# Patient Record
Sex: FEMALE | Race: WHITE | NOT HISPANIC OR LATINO
[De-identification: names, ages, dates, MRNs, and addresses within clinical notes are randomized per-mention and may not be internally consistent; named-entity substitution may affect disease eponyms.]

---

## 2017-08-23 ENCOUNTER — APPOINTMENT (OUTPATIENT)
Dept: SURGERY | Facility: CLINIC | Age: 57
End: 2017-08-23
Payer: COMMERCIAL

## 2017-08-23 VITALS
TEMPERATURE: 98.1 F | BODY MASS INDEX: 25.61 KG/M2 | RESPIRATION RATE: 16 BRPM | SYSTOLIC BLOOD PRESSURE: 158 MMHG | HEART RATE: 66 BPM | DIASTOLIC BLOOD PRESSURE: 77 MMHG | HEIGHT: 64 IN | WEIGHT: 150 LBS | OXYGEN SATURATION: 97 %

## 2017-08-23 DIAGNOSIS — Z78.9 OTHER SPECIFIED HEALTH STATUS: ICD-10-CM

## 2017-08-23 DIAGNOSIS — Z80.0 FAMILY HISTORY OF MALIGNANT NEOPLASM OF DIGESTIVE ORGANS: ICD-10-CM

## 2017-08-23 DIAGNOSIS — Z00.00 ENCOUNTER FOR GENERAL ADULT MEDICAL EXAMINATION W/OUT ABNORMAL FINDINGS: ICD-10-CM

## 2017-08-23 PROCEDURE — 99205 OFFICE O/P NEW HI 60 MIN: CPT

## 2018-04-08 ENCOUNTER — TRANSCRIPTION ENCOUNTER (OUTPATIENT)
Age: 58
End: 2018-04-08

## 2018-04-08 ENCOUNTER — INPATIENT (INPATIENT)
Facility: HOSPITAL | Age: 58
LOS: 1 days | Discharge: HOME | DRG: 418 | End: 2018-04-10
Attending: SURGERY | Admitting: SURGERY
Payer: COMMERCIAL

## 2018-04-08 VITALS
TEMPERATURE: 98 F | HEART RATE: 67 BPM | DIASTOLIC BLOOD PRESSURE: 101 MMHG | SYSTOLIC BLOOD PRESSURE: 197 MMHG | WEIGHT: 145.06 LBS | HEIGHT: 63 IN | RESPIRATION RATE: 18 BRPM | OXYGEN SATURATION: 99 %

## 2018-04-08 DIAGNOSIS — Z98.891 HISTORY OF UTERINE SCAR FROM PREVIOUS SURGERY: Chronic | ICD-10-CM

## 2018-04-08 DIAGNOSIS — Z90.49 ACQUIRED ABSENCE OF OTHER SPECIFIED PARTS OF DIGESTIVE TRACT: Chronic | ICD-10-CM

## 2018-04-08 DIAGNOSIS — K81.9 CHOLECYSTITIS, UNSPECIFIED: ICD-10-CM

## 2018-04-08 LAB
ALBUMIN SERPL ELPH-MCNC: 5 G/DL — SIGNIFICANT CHANGE UP (ref 3.3–5)
ALP SERPL-CCNC: 108 U/L — SIGNIFICANT CHANGE UP (ref 40–120)
ALT FLD-CCNC: 17 U/L RC — SIGNIFICANT CHANGE UP (ref 10–45)
ANION GAP SERPL CALC-SCNC: 18 MMOL/L — HIGH (ref 5–17)
APTT BLD: 30.6 SEC — SIGNIFICANT CHANGE UP (ref 27.5–37.4)
AST SERPL-CCNC: 17 U/L — SIGNIFICANT CHANGE UP (ref 10–40)
BASE EXCESS BLDV CALC-SCNC: 0.3 MMOL/L — SIGNIFICANT CHANGE UP (ref -2–2)
BASOPHILS # BLD AUTO: 0 K/UL — SIGNIFICANT CHANGE UP (ref 0–0.2)
BASOPHILS NFR BLD AUTO: 0.2 % — SIGNIFICANT CHANGE UP (ref 0–2)
BILIRUB SERPL-MCNC: 0.8 MG/DL — SIGNIFICANT CHANGE UP (ref 0.2–1.2)
BLD GP AB SCN SERPL QL: NEGATIVE — SIGNIFICANT CHANGE UP
BUN SERPL-MCNC: 10 MG/DL — SIGNIFICANT CHANGE UP (ref 7–23)
CA-I SERPL-SCNC: 1.15 MMOL/L — SIGNIFICANT CHANGE UP (ref 1.12–1.3)
CALCIUM SERPL-MCNC: 10 MG/DL — SIGNIFICANT CHANGE UP (ref 8.4–10.5)
CHLORIDE BLDV-SCNC: 105 MMOL/L — SIGNIFICANT CHANGE UP (ref 96–108)
CHLORIDE SERPL-SCNC: 99 MMOL/L — SIGNIFICANT CHANGE UP (ref 96–108)
CO2 BLDV-SCNC: 26 MMOL/L — SIGNIFICANT CHANGE UP (ref 22–30)
CO2 SERPL-SCNC: 22 MMOL/L — SIGNIFICANT CHANGE UP (ref 22–31)
CREAT SERPL-MCNC: 0.79 MG/DL — SIGNIFICANT CHANGE UP (ref 0.5–1.3)
EOSINOPHIL # BLD AUTO: 0 K/UL — SIGNIFICANT CHANGE UP (ref 0–0.5)
EOSINOPHIL NFR BLD AUTO: 0.1 % — SIGNIFICANT CHANGE UP (ref 0–6)
GAS PNL BLDV: 136 MMOL/L — SIGNIFICANT CHANGE UP (ref 136–145)
GAS PNL BLDV: SIGNIFICANT CHANGE UP
GAS PNL BLDV: SIGNIFICANT CHANGE UP
GLUCOSE BLDV-MCNC: 125 MG/DL — HIGH (ref 70–99)
GLUCOSE SERPL-MCNC: 127 MG/DL — HIGH (ref 70–99)
HCO3 BLDV-SCNC: 25 MMOL/L — SIGNIFICANT CHANGE UP (ref 21–29)
HCT VFR BLD CALC: 41.2 % — SIGNIFICANT CHANGE UP (ref 34.5–45)
HCT VFR BLDA CALC: 43 % — SIGNIFICANT CHANGE UP (ref 39–50)
HGB BLD CALC-MCNC: 13.9 G/DL — SIGNIFICANT CHANGE UP (ref 11.5–15.5)
HGB BLD-MCNC: 14.2 G/DL — SIGNIFICANT CHANGE UP (ref 11.5–15.5)
INR BLD: 1.06 RATIO — SIGNIFICANT CHANGE UP (ref 0.88–1.16)
LACTATE BLDV-MCNC: 2.7 MMOL/L — HIGH (ref 0.7–2)
LIDOCAIN IGE QN: 39 U/L — SIGNIFICANT CHANGE UP (ref 7–60)
LYMPHOCYTES # BLD AUTO: 1.6 K/UL — SIGNIFICANT CHANGE UP (ref 1–3.3)
LYMPHOCYTES # BLD AUTO: 13.6 % — SIGNIFICANT CHANGE UP (ref 13–44)
MCHC RBC-ENTMCNC: 26.9 PG — LOW (ref 27–34)
MCHC RBC-ENTMCNC: 34.6 GM/DL — SIGNIFICANT CHANGE UP (ref 32–36)
MCV RBC AUTO: 77.8 FL — LOW (ref 80–100)
MONOCYTES # BLD AUTO: 0.3 K/UL — SIGNIFICANT CHANGE UP (ref 0–0.9)
MONOCYTES NFR BLD AUTO: 2.8 % — SIGNIFICANT CHANGE UP (ref 2–14)
NEUTROPHILS # BLD AUTO: 10.1 K/UL — HIGH (ref 1.8–7.4)
NEUTROPHILS NFR BLD AUTO: 83.2 % — HIGH (ref 43–77)
OTHER CELLS CSF MANUAL: 11 ML/DL — LOW (ref 18–22)
PCO2 BLDV: 42 MMHG — SIGNIFICANT CHANGE UP (ref 35–50)
PH BLDV: 7.39 — SIGNIFICANT CHANGE UP (ref 7.35–7.45)
PLATELET # BLD AUTO: 337 K/UL — SIGNIFICANT CHANGE UP (ref 150–400)
PO2 BLDV: 32 MMHG — SIGNIFICANT CHANGE UP (ref 25–45)
POTASSIUM BLDV-SCNC: 3.7 MMOL/L — SIGNIFICANT CHANGE UP (ref 3.5–5)
POTASSIUM SERPL-MCNC: 3.8 MMOL/L — SIGNIFICANT CHANGE UP (ref 3.5–5.3)
POTASSIUM SERPL-SCNC: 3.8 MMOL/L — SIGNIFICANT CHANGE UP (ref 3.5–5.3)
PROT SERPL-MCNC: 8.6 G/DL — HIGH (ref 6–8.3)
PROTHROM AB SERPL-ACNC: 11.6 SEC — SIGNIFICANT CHANGE UP (ref 9.8–12.7)
RAPID RVP RESULT: SIGNIFICANT CHANGE UP
RBC # BLD: 5.29 M/UL — HIGH (ref 3.8–5.2)
RBC # FLD: 12.5 % — SIGNIFICANT CHANGE UP (ref 10.3–14.5)
RH IG SCN BLD-IMP: NEGATIVE — SIGNIFICANT CHANGE UP
SAO2 % BLDV: 60 % — LOW (ref 67–88)
SODIUM SERPL-SCNC: 139 MMOL/L — SIGNIFICANT CHANGE UP (ref 135–145)
WBC # BLD: 12.1 K/UL — HIGH (ref 3.8–10.5)
WBC # FLD AUTO: 12.1 K/UL — HIGH (ref 3.8–10.5)

## 2018-04-08 PROCEDURE — 71045 X-RAY EXAM CHEST 1 VIEW: CPT | Mod: 26

## 2018-04-08 PROCEDURE — 99285 EMERGENCY DEPT VISIT HI MDM: CPT | Mod: 25

## 2018-04-08 PROCEDURE — 74177 CT ABD & PELVIS W/CONTRAST: CPT | Mod: 26

## 2018-04-08 PROCEDURE — 93010 ELECTROCARDIOGRAM REPORT: CPT

## 2018-04-08 RX ORDER — CEFOTETAN DISODIUM 1 G
1 VIAL (EA) INJECTION EVERY 12 HOURS
Qty: 0 | Refills: 0 | Status: DISCONTINUED | OUTPATIENT
Start: 2018-04-08 | End: 2018-04-09

## 2018-04-08 RX ORDER — SODIUM CHLORIDE 9 MG/ML
1000 INJECTION INTRAMUSCULAR; INTRAVENOUS; SUBCUTANEOUS ONCE
Qty: 0 | Refills: 0 | Status: COMPLETED | OUTPATIENT
Start: 2018-04-08 | End: 2018-04-08

## 2018-04-08 RX ORDER — SODIUM CHLORIDE 9 MG/ML
1000 INJECTION, SOLUTION INTRAVENOUS
Qty: 0 | Refills: 0 | Status: DISCONTINUED | OUTPATIENT
Start: 2018-04-08 | End: 2018-04-09

## 2018-04-08 RX ORDER — MORPHINE SULFATE 50 MG/1
4 CAPSULE, EXTENDED RELEASE ORAL ONCE
Qty: 0 | Refills: 0 | Status: DISCONTINUED | OUTPATIENT
Start: 2018-04-08 | End: 2018-04-08

## 2018-04-08 RX ORDER — MEROPENEM 1 G/30ML
1000 INJECTION INTRAVENOUS ONCE
Qty: 0 | Refills: 0 | Status: COMPLETED | OUTPATIENT
Start: 2018-04-08 | End: 2018-04-08

## 2018-04-08 RX ORDER — ENOXAPARIN SODIUM 100 MG/ML
40 INJECTION SUBCUTANEOUS DAILY
Qty: 0 | Refills: 0 | Status: DISCONTINUED | OUTPATIENT
Start: 2018-04-08 | End: 2018-04-09

## 2018-04-08 RX ORDER — ONDANSETRON 8 MG/1
4 TABLET, FILM COATED ORAL ONCE
Qty: 0 | Refills: 0 | Status: COMPLETED | OUTPATIENT
Start: 2018-04-08 | End: 2018-04-08

## 2018-04-08 RX ORDER — ACETAMINOPHEN 500 MG
1000 TABLET ORAL ONCE
Qty: 0 | Refills: 0 | Status: COMPLETED | OUTPATIENT
Start: 2018-04-08 | End: 2018-04-08

## 2018-04-08 RX ADMIN — SODIUM CHLORIDE 1000 MILLILITER(S): 9 INJECTION INTRAMUSCULAR; INTRAVENOUS; SUBCUTANEOUS at 11:11

## 2018-04-08 RX ADMIN — MORPHINE SULFATE 4 MILLIGRAM(S): 50 CAPSULE, EXTENDED RELEASE ORAL at 11:40

## 2018-04-08 RX ADMIN — ONDANSETRON 4 MILLIGRAM(S): 8 TABLET, FILM COATED ORAL at 14:36

## 2018-04-08 RX ADMIN — MORPHINE SULFATE 4 MILLIGRAM(S): 50 CAPSULE, EXTENDED RELEASE ORAL at 11:10

## 2018-04-08 RX ADMIN — ONDANSETRON 4 MILLIGRAM(S): 8 TABLET, FILM COATED ORAL at 11:11

## 2018-04-08 RX ADMIN — Medication 100 GRAM(S): at 18:40

## 2018-04-08 RX ADMIN — MEROPENEM 100 MILLIGRAM(S): 1 INJECTION INTRAVENOUS at 18:03

## 2018-04-08 RX ADMIN — ENOXAPARIN SODIUM 40 MILLIGRAM(S): 100 INJECTION SUBCUTANEOUS at 18:40

## 2018-04-08 RX ADMIN — Medication 1000 MILLIGRAM(S): at 12:39

## 2018-04-08 RX ADMIN — SODIUM CHLORIDE 100 MILLILITER(S): 9 INJECTION, SOLUTION INTRAVENOUS at 18:03

## 2018-04-08 RX ADMIN — Medication 400 MILLIGRAM(S): at 12:09

## 2018-04-08 NOTE — H&P ADULT - NSHPREVIEWOFSYSTEMS_GEN_ALL_CORE
Constitutional: No fever, fatigue or weight loss.  Skin: No rash.  Eyes: No recent vision problems or eye pain.  ENT: No congestion, ear pain, or sore throat.  Endocrine: No thyroid problems.  Cardiovascular: No chest pain or palpation.  Respiratory: No cough, shortness of breath, congestion, or wheezing.  Gastrointestinal:as above  Genitourinary: No dysuria.  Musculoskeletal: No joint swelling.  Neurologic: No headache.

## 2018-04-08 NOTE — H&P ADULT - ASSESSMENT
Patient is a 57 yo female with acute calculus cholecystitis presenting with about 12 hours of pain, normal LFTs and slight leukocytosis.  - Admit to General Surgery (Green Team) under Dr. RICA aVng  - Cefotetan  - NPO / IVF  - Ambulation / pulmonary toileting  - Lovenox for VTE ppx  - Rapid Respiratory viral panel  - Patient discussed with Dr. Vang  - Surgery added on for Monday vs later next week    MANAS Evans MD PGYII  x [phone]

## 2018-04-08 NOTE — ED PROVIDER NOTE - PROGRESS NOTE DETAILS
d/w surg, will see pt. may need hida. d/w rads. they are attempting to call in for hida however no call back from page twice - will get back to me. surg has seen pt. awaiting their recs.

## 2018-04-08 NOTE — ED ADULT NURSE NOTE - OBJECTIVE STATEMENT
58 yr old female with h/o cholelithiasis  present to the ER for RUQ abd pain associated with nausea and vomiting. pt reported that she started to experience upper back pain yesterday pm. Report that afterwards pain started in the RUQ abd, stated she vomited greater than 10 times. Pt reports pain level of 10/10 on pain scale and sharp in quality and waxing and waning. Denies fever, chills/ diarrhea.

## 2018-04-08 NOTE — H&P ADULT - HISTORY OF PRESENT ILLNESS
Patient is a slightly anxious but pleasant 58 year old female with no significant medical history presenting with abdominal pain of acute onset that started at midnight after binge eating fattening foods that evening.  She has a known history of gallstones and states that she is being followed by Dr. NEO Carter closely.  The patient states that shes had R abdominal pain a few times before, but nothing like this.  She had 10 episodes of emesis and nausea last night with associated chills.  She has no change in bowel function and states that she has been extremely stressed as of late due to work and her daughter.  Patient states she has had sniffles for the past few days.    ED: IVF, Morphine, Tylenol, Zofran  PMH: cholelithiasis, appendectomy,  section (15 years ago)  Social: negative for substance abuse, lives with  and daughter, works as an

## 2018-04-08 NOTE — ED PROVIDER NOTE - OBJECTIVE STATEMENT
58 year old female w no PMHx presents w complaint of abdominal pain and nausea which began last night. She has vomited 10 times since last night. She reports a history of gallstones. Denies fever, chills, diarrhea. History of C section and appendectomy.

## 2018-04-08 NOTE — H&P ADULT - ATTENDING COMMENTS
58 y.o. female with acute onset RUQ pain accompanied by many episodes of nausea and vomiting. Has known gallstones and multiple similar but less severe attacks in the past. Sono confirms a large calcified stone in the gallbladder neck. For admission, IV AB Rx and lap fadia this admission.

## 2018-04-08 NOTE — H&P ADULT - NSHPLABSRESULTS_GEN_ALL_CORE
14.2   12.1  )-----------( 337      ( 08 Apr 2018 11:17 )             41.2       04-08    139  |  99  |  10  ----------------------------<  127<H>  3.8   |  22  |  0.79    Ca    10.0      08 Apr 2018 11:17    TPro  8.6<H>  /  Alb  5.0  /  TBili  0.8  /  DBili  x   /  AST  17  /  ALT  17  /  AlkPhos  108  04-08    < from: CT Abdomen and Pelvis w/ Oral Cont and w/ IV Cont (04.08.18 @ 12:39) >    FINDINGS:    LOWER CHEST: Within normal limits.    LIVER: Within normal limits.  BILE DUCTS: Normal caliber.  GALLBLADDER: Large calcified gallstone. No appreciable gallbladder wall   thickening however, there is trace pericholecystic fluid/inflammatory   change in the right upper quadrant.  SPLEEN: Within normal limits.  PANCREAS: Within normal limits.  ADRENALS: Within normal limits.  KIDNEYS/URETERS: Bilateral hypodensities are too small to characterize.    BLADDER: Within normal limits.  REPRODUCTIVE ORGANS: Fibroid uterus, the largest measuring 5.1 x 4.6 x   4.3 cm. No adnexal masses.    BOWEL: No bowel obstruction. Appendix is not visualized.  PERITONEUM: Trace pelvic free fluid.  VESSELS:  Within normal limits.  RETROPERITONEUM: No lymphadenopathy.    ABDOMINAL WALL: Within normal limits.  BONES: Within normal limits.    IMPRESSION: Large calcified gallbladder stone with trace pericholecystic   fluid/inflammatory changes, raising concern for acute cholecystitis.   Trace pelvic ascites is also noted.Consider correlation with HIDA.    Fibroid uterus.    < end of copied text >

## 2018-04-08 NOTE — H&P ADULT - NSHPPHYSICALEXAM_GEN_ALL_CORE
PHYSICAL EXAM:  GENERAL: NAD, well-developed  HEAD:  Atraumatic, Normocephalic  EYES: EOMI, PERRLA, conjunctiva and sclera clear  NECK: Supple, No JVD  CHEST/LUNG: Clear to auscultation bilaterally; No wheeze  HEART: Regular rate and rhythm; No murmurs, rubs, or gallops  ABDOMEN: Soft, +Gomez's sign, no rebound, no guarding  EXTREMITIES:  2+ Peripheral Pulses, No clubbing, cyanosis, or edema  PSYCH: AAOx3  NEUROLOGY: non-focal  SKIN: No rashes or lesions

## 2018-04-08 NOTE — ED PROVIDER NOTE - ATTENDING CONTRIBUTION TO CARE
1 day of vomiting and abdominal pain. past hx appy/section. abdominal ttp max at ruq. labs/ctap/ua. Symptomatic treatment.

## 2018-04-09 ENCOUNTER — RESULT REVIEW (OUTPATIENT)
Age: 58
End: 2018-04-09

## 2018-04-09 LAB
ANION GAP SERPL CALC-SCNC: 9 MMOL/L — SIGNIFICANT CHANGE UP (ref 5–17)
BUN SERPL-MCNC: 10 MG/DL — SIGNIFICANT CHANGE UP (ref 7–23)
CALCIUM SERPL-MCNC: 9 MG/DL — SIGNIFICANT CHANGE UP (ref 8.4–10.5)
CHLORIDE SERPL-SCNC: 104 MMOL/L — SIGNIFICANT CHANGE UP (ref 96–108)
CO2 SERPL-SCNC: 27 MMOL/L — SIGNIFICANT CHANGE UP (ref 22–31)
CREAT SERPL-MCNC: 1.03 MG/DL — SIGNIFICANT CHANGE UP (ref 0.5–1.3)
GLUCOSE SERPL-MCNC: 99 MG/DL — SIGNIFICANT CHANGE UP (ref 70–99)
HCT VFR BLD CALC: 36.8 % — SIGNIFICANT CHANGE UP (ref 34.5–45)
HGB BLD-MCNC: 12 G/DL — SIGNIFICANT CHANGE UP (ref 11.5–15.5)
MAGNESIUM SERPL-MCNC: 2.2 MG/DL — SIGNIFICANT CHANGE UP (ref 1.6–2.6)
MCHC RBC-ENTMCNC: 26 PG — LOW (ref 27–34)
MCHC RBC-ENTMCNC: 32.6 GM/DL — SIGNIFICANT CHANGE UP (ref 32–36)
MCV RBC AUTO: 79.7 FL — LOW (ref 80–100)
PHOSPHATE SERPL-MCNC: 3.3 MG/DL — SIGNIFICANT CHANGE UP (ref 2.5–4.5)
PLATELET # BLD AUTO: 253 K/UL — SIGNIFICANT CHANGE UP (ref 150–400)
POTASSIUM SERPL-MCNC: 4.1 MMOL/L — SIGNIFICANT CHANGE UP (ref 3.5–5.3)
POTASSIUM SERPL-SCNC: 4.1 MMOL/L — SIGNIFICANT CHANGE UP (ref 3.5–5.3)
RBC # BLD: 4.62 M/UL — SIGNIFICANT CHANGE UP (ref 3.8–5.2)
RBC # FLD: 13.8 % — SIGNIFICANT CHANGE UP (ref 10.3–14.5)
SODIUM SERPL-SCNC: 140 MMOL/L — SIGNIFICANT CHANGE UP (ref 135–145)
WBC # BLD: 6.2 K/UL — SIGNIFICANT CHANGE UP (ref 3.8–10.5)
WBC # FLD AUTO: 6.2 K/UL — SIGNIFICANT CHANGE UP (ref 3.8–10.5)

## 2018-04-09 PROCEDURE — 88304 TISSUE EXAM BY PATHOLOGIST: CPT | Mod: 26

## 2018-04-09 RX ORDER — OXYCODONE HYDROCHLORIDE 5 MG/1
5 TABLET ORAL EVERY 6 HOURS
Qty: 0 | Refills: 0 | Status: DISCONTINUED | OUTPATIENT
Start: 2018-04-09 | End: 2018-04-10

## 2018-04-09 RX ORDER — HYDRALAZINE HCL 50 MG
20 TABLET ORAL ONCE
Qty: 0 | Refills: 0 | Status: COMPLETED | OUTPATIENT
Start: 2018-04-09 | End: 2018-04-09

## 2018-04-09 RX ORDER — LABETALOL HCL 100 MG
10 TABLET ORAL ONCE
Qty: 0 | Refills: 0 | Status: COMPLETED | OUTPATIENT
Start: 2018-04-09 | End: 2018-04-09

## 2018-04-09 RX ORDER — HYDRALAZINE HCL 50 MG
10 TABLET ORAL ONCE
Qty: 0 | Refills: 0 | Status: COMPLETED | OUTPATIENT
Start: 2018-04-09 | End: 2018-04-09

## 2018-04-09 RX ORDER — ACETAMINOPHEN 500 MG
1000 TABLET ORAL ONCE
Qty: 0 | Refills: 0 | Status: COMPLETED | OUTPATIENT
Start: 2018-04-09 | End: 2018-04-09

## 2018-04-09 RX ORDER — LABETALOL HCL 100 MG
5 TABLET ORAL ONCE
Qty: 0 | Refills: 0 | Status: COMPLETED | OUTPATIENT
Start: 2018-04-09 | End: 2018-04-09

## 2018-04-09 RX ORDER — DEXTROSE MONOHYDRATE, SODIUM CHLORIDE, AND POTASSIUM CHLORIDE 50; .745; 4.5 G/1000ML; G/1000ML; G/1000ML
1000 INJECTION, SOLUTION INTRAVENOUS
Qty: 0 | Refills: 0 | Status: DISCONTINUED | OUTPATIENT
Start: 2018-04-09 | End: 2018-04-10

## 2018-04-09 RX ORDER — ACETAMINOPHEN 500 MG
650 TABLET ORAL EVERY 6 HOURS
Qty: 0 | Refills: 0 | Status: DISCONTINUED | OUTPATIENT
Start: 2018-04-09 | End: 2018-04-10

## 2018-04-09 RX ORDER — LABETALOL HCL 100 MG
20 TABLET ORAL ONCE
Qty: 0 | Refills: 0 | Status: COMPLETED | OUTPATIENT
Start: 2018-04-09 | End: 2018-04-09

## 2018-04-09 RX ORDER — ONDANSETRON 8 MG/1
4 TABLET, FILM COATED ORAL EVERY 6 HOURS
Qty: 0 | Refills: 0 | Status: COMPLETED | OUTPATIENT
Start: 2018-04-09 | End: 2018-04-09

## 2018-04-09 RX ORDER — HYDROMORPHONE HYDROCHLORIDE 2 MG/ML
0.5 INJECTION INTRAMUSCULAR; INTRAVENOUS; SUBCUTANEOUS
Qty: 0 | Refills: 0 | Status: DISCONTINUED | OUTPATIENT
Start: 2018-04-09 | End: 2018-04-09

## 2018-04-09 RX ORDER — HYDROMORPHONE HYDROCHLORIDE 2 MG/ML
1 INJECTION INTRAMUSCULAR; INTRAVENOUS; SUBCUTANEOUS
Qty: 0 | Refills: 0 | Status: DISCONTINUED | OUTPATIENT
Start: 2018-04-09 | End: 2018-04-09

## 2018-04-09 RX ORDER — ONDANSETRON 8 MG/1
4 TABLET, FILM COATED ORAL ONCE
Qty: 0 | Refills: 0 | Status: COMPLETED | OUTPATIENT
Start: 2018-04-09 | End: 2018-04-09

## 2018-04-09 RX ORDER — OXYCODONE HYDROCHLORIDE 5 MG/1
10 TABLET ORAL EVERY 6 HOURS
Qty: 0 | Refills: 0 | Status: DISCONTINUED | OUTPATIENT
Start: 2018-04-09 | End: 2018-04-10

## 2018-04-09 RX ADMIN — Medication 1000 MILLIGRAM(S): at 19:15

## 2018-04-09 RX ADMIN — ONDANSETRON 4 MILLIGRAM(S): 8 TABLET, FILM COATED ORAL at 22:48

## 2018-04-09 RX ADMIN — Medication 400 MILLIGRAM(S): at 18:49

## 2018-04-09 RX ADMIN — Medication 1000 MILLIGRAM(S): at 02:50

## 2018-04-09 RX ADMIN — SODIUM CHLORIDE 100 MILLILITER(S): 9 INJECTION, SOLUTION INTRAVENOUS at 05:29

## 2018-04-09 RX ADMIN — HYDROMORPHONE HYDROCHLORIDE 0.5 MILLIGRAM(S): 2 INJECTION INTRAMUSCULAR; INTRAVENOUS; SUBCUTANEOUS at 13:45

## 2018-04-09 RX ADMIN — OXYCODONE HYDROCHLORIDE 5 MILLIGRAM(S): 5 TABLET ORAL at 23:20

## 2018-04-09 RX ADMIN — HYDROMORPHONE HYDROCHLORIDE 0.5 MILLIGRAM(S): 2 INJECTION INTRAMUSCULAR; INTRAVENOUS; SUBCUTANEOUS at 13:15

## 2018-04-09 RX ADMIN — Medication 10 MILLIGRAM(S): at 15:50

## 2018-04-09 RX ADMIN — Medication 100 GRAM(S): at 05:29

## 2018-04-09 RX ADMIN — OXYCODONE HYDROCHLORIDE 5 MILLIGRAM(S): 5 TABLET ORAL at 22:48

## 2018-04-09 RX ADMIN — Medication 20 MILLIGRAM(S): at 17:03

## 2018-04-09 RX ADMIN — Medication 20 MILLIGRAM(S): at 22:48

## 2018-04-09 RX ADMIN — Medication 400 MILLIGRAM(S): at 02:20

## 2018-04-09 RX ADMIN — Medication 5 MILLIGRAM(S): at 13:05

## 2018-04-09 RX ADMIN — ONDANSETRON 4 MILLIGRAM(S): 8 TABLET, FILM COATED ORAL at 18:49

## 2018-04-09 RX ADMIN — DEXTROSE MONOHYDRATE, SODIUM CHLORIDE, AND POTASSIUM CHLORIDE 100 MILLILITER(S): 50; .745; 4.5 INJECTION, SOLUTION INTRAVENOUS at 19:00

## 2018-04-09 RX ADMIN — Medication 10 MILLIGRAM(S): at 13:35

## 2018-04-09 NOTE — PROGRESS NOTE ADULT - SUBJECTIVE AND OBJECTIVE BOX
TRAUMA SURGERY PROGRESS NOTE (pager #[phone])    SUBJECTIVE: 59yo Woman seen and examined during morning rounds. No acute events overnight. Afebrile, VS stable. Pain well controlled with current regimen.     OBJECTIVE:    Physical Exam:  Gen: Resting in bed, NAD, alert and oriented  Resp: airway patent, respirations unlabored, no increased WOB   Abd: soft, NT/ND    Vital Signs Last 24 Hrs  T(C): 36.9 (09 Apr 2018 01:09), Max: 36.9 (08 Apr 2018 21:45)  T(F): 98.5 (09 Apr 2018 01:09), Max: 98.5 (08 Apr 2018 21:45)  HR: 71 (09 Apr 2018 01:09) (62 - 71)  BP: 139/65 (09 Apr 2018 01:09) (126/71 - 197/101)  BP(mean): --  RR: 18 (09 Apr 2018 01:09) (16 - 18)  SpO2: 95% (09 Apr 2018 01:09) (95% - 100%)    I&O's Detail    08 Apr 2018 07:01  -  09 Apr 2018 05:20  --------------------------------------------------------  IN:    IV PiggyBack: 50 mL    lactated ringers.: 200 mL  Total IN: 250 mL    OUT:    Voided: 750 mL  Total OUT: 750 mL    Total NET: -500 mL          MEDICATIONS  (STANDING):  cefoTEtan  IVPB 1 Gram(s) IV Intermittent every 12 hours  enoxaparin Injectable 40 milliGRAM(s) SubCutaneous daily  lactated ringers. 1000 milliLiter(s) (100 mL/Hr) IV Continuous <Continuous>    MEDICATIONS  (PRN):      LABS:                        14.2   12.1  )-----------( 337      ( 08 Apr 2018 11:17 )             41.2       04-08    139  |  99  |  10  ----------------------------<  127<H>  3.8   |  22  |  0.79    Ca    10.0      08 Apr 2018 11:17    TPro  8.6<H>  /  Alb  5.0  /  TBili  0.8  /  DBili  x   /  AST  17  /  ALT  17  /  AlkPhos  108  04-08 SURGERY PROGRESS NOTE (pager #[phone])    SUBJECTIVE: 57yo Woman seen and examined during morning rounds. No acute events overnight. Afebrile, VS stable. Pain well controlled with current regimen.     OBJECTIVE:    Physical Exam:  Gen: Resting in bed, NAD, alert and oriented  Resp: airway patent, respirations unlabored, no increased WOB   Abd: soft, NT/ND    Vital Signs Last 24 Hrs  T(C): 36.9 (09 Apr 2018 01:09), Max: 36.9 (08 Apr 2018 21:45)  T(F): 98.5 (09 Apr 2018 01:09), Max: 98.5 (08 Apr 2018 21:45)  HR: 71 (09 Apr 2018 01:09) (62 - 71)  BP: 139/65 (09 Apr 2018 01:09) (126/71 - 197/101)  BP(mean): --  RR: 18 (09 Apr 2018 01:09) (16 - 18)  SpO2: 95% (09 Apr 2018 01:09) (95% - 100%)    I&O's Detail    08 Apr 2018 07:01  -  09 Apr 2018 05:20  --------------------------------------------------------  IN:    IV PiggyBack: 50 mL    lactated ringers.: 200 mL  Total IN: 250 mL    OUT:    Voided: 750 mL  Total OUT: 750 mL    Total NET: -500 mL          MEDICATIONS  (STANDING):  cefoTEtan  IVPB 1 Gram(s) IV Intermittent every 12 hours  enoxaparin Injectable 40 milliGRAM(s) SubCutaneous daily  lactated ringers. 1000 milliLiter(s) (100 mL/Hr) IV Continuous <Continuous>    MEDICATIONS  (PRN):      LABS:                        14.2   12.1  )-----------( 337      ( 08 Apr 2018 11:17 )             41.2       04-08    139  |  99  |  10  ----------------------------<  127<H>  3.8   |  22  |  0.79    Ca    10.0      08 Apr 2018 11:17    TPro  8.6<H>  /  Alb  5.0  /  TBili  0.8  /  DBili  x   /  AST  17  /  ALT  17  /  AlkPhos  108  04-08

## 2018-04-09 NOTE — CONSULT NOTE ADULT - SUBJECTIVE AND OBJECTIVE BOX
SUBJECTIVE:  57 yo Female patient of Dr. Ross.   called yesterday to inform us that the patient was in [facility] ER with upper abdominal/RUQ pain radiating to her back which started Saturday night, about 11:45 pm, not relenting, and associated with multiple bouts of vomiting.  No fever.  No diarrhea.  Patient previously known to have gallstones.  CT scan confirmed a large calcified gallstone and findings consistent with cholecystitis.  Per patient, plan is for cholecystectomy this admission.  ______________________________________________________________________  PMH/PSH:  PAST MEDICAL & SURGICAL HISTORY:  S/P  section  S/P appendectomy  ______________________________________________________________________  MEDS:  MEDICATIONS  (STANDING):  cefoTEtan  IVPB 1 Gram(s) IV Intermittent every 12 hours  enoxaparin Injectable 40 milliGRAM(s) SubCutaneous daily  lactated ringers. 1000 milliLiter(s) (100 mL/Hr) IV Continuous <Continuous>  ______________________________________________________________________  ALL: penicillin (Hives)  ______________________________________________________________________  SH:  .   is Michael.  ______________________________________________________________________  ROS:  General:  No weight loss	  Skin/Breast:  Normal  Ophthalmologic:  Normal  ENMT:	Normal  Respiratory and Thorax:  Normal  Cardiovascular:	 Normal  Gastrointestinal:	  Above  Genitourinary:	Normal  Musculoskeletal:	  Normal  Neurological:	Normal  Psychiatric:  Normal	  Hematology/Lymphatics:	  Normal  Endocrine:  Normal  Allergic/Immunologic:  Normal	  ______________________________________________________________________  PHYSICAL EXAM:  T(C): 36.2 (18 @ 05:42), Max: 36.9 (18 @ 21:45)  HR: 63 (18 @ 05:42)  BP: 137/84 (18 @ 05:42)  RR: 18 (18 @ 05:42)  SpO2: 96% (18 @ 05:42)  Wt(kg): --  PHYSICAL EXAM:  Constitutional:  Appears well  HEENT:  Normal  Neck:  Supple  Respiratory:  Clear  Cardiovascular:  Regular  Gastrointestinal:  Mild RUQ tenderness (per patient, yesterday she was very tender in this area).  Extremities:  No edema  Neurological:  A&O x 3    ______________________________________________________________________  LABS:                        14.2   12.1  )-----------( 337      ( 2018 11:17 )             41.2         140  |  104  |  10  ----------------------------<  99  4.1   |  27  |  1.03    Ca    9.0      2018 07:21  Phos  3.3     04-  Mg     2.2     04-09    TPro  8.6<H>  /  Alb  5.0  /  TBili  0.8  /  DBili  x   /  AST  17  /  ALT  17  /  AlkPhos  108  04-08    LIVER FUNCTIONS - ( 2018 11:17 )  Alb: 5.0 g/dL / Pro: 8.6 g/dL / ALK PHOS: 108 U/L / ALT: 17 U/L RC / AST: 17 U/L / GGT: x           PT/INR - ( 2018 11:17 )   PT: 11.6 sec;   INR: 1.06 ratio         PTT - ( 2018 11:17 )  PTT:30.6 sec  ______________________________________________________________________  IMAGING:  CT abd/pel:  Large calcified gallbladder stone with trace pericholecystic fluid/inflammatory changes, raising concern for acute cholecystitis.  Fibroid uterus.  ______________________________________________________________________  ASSESSMENT:  Acute cholecystitis from cholelithiasis.    PLAN:  - Management as per surgical team.  - Agree with cholecystectomy.    Ros Albright MD  (o) [phone]

## 2018-04-09 NOTE — PROGRESS NOTE ADULT - ASSESSMENT
Patient is a 59 yo female with acute calculus cholecystitis presenting with about 12 hours of pain, normal LFTs and slight leukocytosis.    Plan:  - Cont. Cefotetan  - NPO / IVF  - Ambulation / pulmonary toileting  - Lovenox for VTE ppx  - F/u Rapid Respiratory viral panel  - Likely lap fadia on this admission    x [phone]

## 2018-04-09 NOTE — BRIEF OPERATIVE NOTE - PROCEDURE
<<-----Click on this checkbox to enter Procedure Cholecystectomies, laparoscopic  04/09/2018    Active  YARELIS

## 2018-04-09 NOTE — CHART NOTE - NSCHARTNOTEFT_GEN_A_CORE
S: Patient underwent Cholecystectomy, laparoscopic and tolerated procedure without issue and was sent to PACU.  Patient denies chest pain, shortness of breath, nausea, vomiting, lightheadedness, or dizziness.  Pain is well controlled.      O:  T(C): 37 (04-09-18 @ 18:32), Max: 37 (04-09-18 @ 18:32)  HR: 72 (04-09-18 @ 19:12) (56 - 75)  BP: 170/84 (04-09-18 @ 19:12) (163/93 - 216/98)  RR: 20 (04-09-18 @ 18:32) (11 - 20)  SpO2: 97% (04-09-18 @ 18:32) (93% - 100%)  Wt(kg): --                        12.0   6.20  )-----------( 253      ( 09 Apr 2018 09:26 )             36.8        04-09    140  |  104  |  10  ----------------------------<  99  4.1   |  27  |  1.03    Ca    9.0      09 Apr 2018 07:21  Phos  3.3     04-09  Mg     2.2     04-09      Gen: NAD  Resp: No increased WOB  Abd: Soft, nontender, nondistended.  No palpable masses, dressings c/d/i    A: 58y Female s/p Cholecystectomies, laparoscopic      P:  - Pain control  - Diet: Low fat  - DVT PPX  - Out of bed and encourage early ambulation  - Incentive spirometry

## 2018-04-10 ENCOUNTER — TRANSCRIPTION ENCOUNTER (OUTPATIENT)
Age: 58
End: 2018-04-10

## 2018-04-10 VITALS
SYSTOLIC BLOOD PRESSURE: 147 MMHG | TEMPERATURE: 99 F | OXYGEN SATURATION: 98 % | DIASTOLIC BLOOD PRESSURE: 75 MMHG | HEART RATE: 67 BPM | RESPIRATION RATE: 18 BRPM

## 2018-04-10 DIAGNOSIS — K80.20 CALCULUS OF GALLBLADDER W/OUT CHOLECYSTITIS W/OUT OBSTRUCTION: ICD-10-CM

## 2018-04-10 DIAGNOSIS — R10.11 RIGHT UPPER QUADRANT PAIN: ICD-10-CM

## 2018-04-10 LAB
ANION GAP SERPL CALC-SCNC: 15 MMOL/L — SIGNIFICANT CHANGE UP (ref 5–17)
BUN SERPL-MCNC: 10 MG/DL — SIGNIFICANT CHANGE UP (ref 7–23)
CALCIUM SERPL-MCNC: 9 MG/DL — SIGNIFICANT CHANGE UP (ref 8.4–10.5)
CHLORIDE SERPL-SCNC: 103 MMOL/L — SIGNIFICANT CHANGE UP (ref 96–108)
CO2 SERPL-SCNC: 23 MMOL/L — SIGNIFICANT CHANGE UP (ref 22–31)
CREAT SERPL-MCNC: 0.85 MG/DL — SIGNIFICANT CHANGE UP (ref 0.5–1.3)
GLUCOSE SERPL-MCNC: 112 MG/DL — HIGH (ref 70–99)
HCT VFR BLD CALC: 34.8 % — SIGNIFICANT CHANGE UP (ref 34.5–45)
HGB BLD-MCNC: 11.7 G/DL — SIGNIFICANT CHANGE UP (ref 11.5–15.5)
MAGNESIUM SERPL-MCNC: 2 MG/DL — SIGNIFICANT CHANGE UP (ref 1.6–2.6)
MCHC RBC-ENTMCNC: 25.5 PG — LOW (ref 27–34)
MCHC RBC-ENTMCNC: 33.6 GM/DL — SIGNIFICANT CHANGE UP (ref 32–36)
MCV RBC AUTO: 75.8 FL — LOW (ref 80–100)
PHOSPHATE SERPL-MCNC: 3.1 MG/DL — SIGNIFICANT CHANGE UP (ref 2.5–4.5)
PLATELET # BLD AUTO: 252 K/UL — SIGNIFICANT CHANGE UP (ref 150–400)
POTASSIUM SERPL-MCNC: 4 MMOL/L — SIGNIFICANT CHANGE UP (ref 3.5–5.3)
POTASSIUM SERPL-SCNC: 4 MMOL/L — SIGNIFICANT CHANGE UP (ref 3.5–5.3)
RBC # BLD: 4.59 M/UL — SIGNIFICANT CHANGE UP (ref 3.8–5.2)
RBC # FLD: 14 % — SIGNIFICANT CHANGE UP (ref 10.3–14.5)
SODIUM SERPL-SCNC: 141 MMOL/L — SIGNIFICANT CHANGE UP (ref 135–145)
WBC # BLD: 10.17 K/UL — SIGNIFICANT CHANGE UP (ref 3.8–10.5)
WBC # FLD AUTO: 10.17 K/UL — SIGNIFICANT CHANGE UP (ref 3.8–10.5)

## 2018-04-10 RX ORDER — ACETAMINOPHEN 500 MG
2 TABLET ORAL
Qty: 0 | Refills: 0 | COMMUNITY
Start: 2018-04-10

## 2018-04-10 RX ORDER — ENOXAPARIN SODIUM 100 MG/ML
40 INJECTION SUBCUTANEOUS DAILY
Qty: 0 | Refills: 0 | Status: DISCONTINUED | OUTPATIENT
Start: 2018-04-10 | End: 2018-04-10

## 2018-04-10 RX ORDER — OXYCODONE HYDROCHLORIDE 5 MG/1
1 TABLET ORAL
Qty: 25 | Refills: 0 | OUTPATIENT
Start: 2018-04-10 | End: 2018-04-16

## 2018-04-10 RX ADMIN — OXYCODONE HYDROCHLORIDE 5 MILLIGRAM(S): 5 TABLET ORAL at 15:22

## 2018-04-10 RX ADMIN — OXYCODONE HYDROCHLORIDE 5 MILLIGRAM(S): 5 TABLET ORAL at 07:00

## 2018-04-10 RX ADMIN — OXYCODONE HYDROCHLORIDE 5 MILLIGRAM(S): 5 TABLET ORAL at 06:28

## 2018-04-10 RX ADMIN — OXYCODONE HYDROCHLORIDE 5 MILLIGRAM(S): 5 TABLET ORAL at 14:52

## 2018-04-10 RX ADMIN — ENOXAPARIN SODIUM 40 MILLIGRAM(S): 100 INJECTION SUBCUTANEOUS at 11:56

## 2018-04-10 NOTE — PROGRESS NOTE ADULT - ASSESSMENT
Patient is a 57 yo female with acute calculus cholecystitis presenting with about 12 hours of pain, normal LFTs and slight leukocytosis.    Plan:  - BP control  - Cont. Cefotetan  - Low fat diet  - Ambulation / pulmonary toileting  - Lovenox for VTE ppx  - Likely DC today    x [phone]

## 2018-04-10 NOTE — DISCHARGE NOTE ADULT - MEDICATION SUMMARY - MEDICATIONS TO TAKE
I will START or STAY ON the medications listed below when I get home from the hospital:    Boiron Sabadil  -- Indication: For Supplement    Peterson Eye Vitamin  -- Indication: For Supplement    Fusion Burn Garcinia with Apple Cider Vinegar  -- Indication: For Supplement    Boiron Sinusalia  -- Indication: For Supplement    Similasan Complete Eye Relief  -- 1 drop(s) in each eye 2 times a day  -- Indication: For Supplement    acetaminophen 325 mg oral tablet  -- 2 tab(s) by mouth every 6 hours, As needed, Mild Pain (1 - 3)  -- Indication: For mild pain    oxyCODONE 5 mg oral tablet  -- 1-2  tab(s) by mouth every 4-6 hours, As Needed MDD:8  -- Caution federal law prohibits the transfer of this drug to any person other  than the person for whom it was prescribed.  It is very important that you take or use this exactly as directed.  Do not skip doses or discontinue unless directed by your doctor.  May cause drowsiness.  Alcohol may intensify this effect.  Use care when operating dangerous machinery.  This prescription cannot be refilled.  Using more of this medication than prescribed may cause serious breathing problems.    -- Indication: For mod pain I will START or STAY ON the medications listed below when I get home from the hospital:    Boiron Sabadil  -- Indication: For home medication    Peterson Eye Vitamin  -- Indication: For home medication    Fusion Burn Garcinia with Apple Cider Vinegar  -- Indication: For home medication    Boiron Sinusalia  -- Indication: For home medication    Similasan Complete Eye Relief  -- 1 drop(s) in each eye 2 times a day  -- Indication: For home medication    acetaminophen 325 mg oral tablet  -- 2 tab(s) by mouth every 6 hours, As needed, Mild Pain (1 - 3)  -- Indication: For mild pain    oxyCODONE 5 mg oral tablet  -- 1-2  tab(s) by mouth every 4-6 hours, As Needed MDD:8  -- Caution federal law prohibits the transfer of this drug to any person other  than the person for whom it was prescribed.  It is very important that you take or use this exactly as directed.  Do not skip doses or discontinue unless directed by your doctor.  May cause drowsiness.  Alcohol may intensify this effect.  Use care when operating dangerous machinery.  This prescription cannot be refilled.  Using more of this medication than prescribed may cause serious breathing problems.    -- Indication: For Severe pain

## 2018-04-10 NOTE — DISCHARGE NOTE ADULT - PLAN OF CARE
wound healing Activity- No heavy lifting or straining over 15 lbs for the next two weeks;  Driving- Please do not drive until your pain is well controlled and you do not need to take pain medications.  You may shower-Do not submerge or scrub incision sites.  Please pat dry incisions/dressings.  Leave the white steri strips in place, they will fall off on their own in approximately 5-7 days.

## 2018-04-10 NOTE — DISCHARGE NOTE ADULT - CARE PROVIDER_API CALL
Florencio Vang (MD), Surgery  [address] [facility]  [address] [place] [id number]  Phone: [phone]  Fax: [phone]

## 2018-04-10 NOTE — DISCHARGE NOTE ADULT - HOSPITAL COURSE
Patient is a 57 yo female with acute calculus cholecystitis presenting with about 12 hours of pain, normal LFTs and slight leukocytosis.  Imaging: Large calcified gallbladder stone with trace pericholecystic fluid/inflammatory changes, raising concern for acute cholecystitis. Trace pelvic ascites is also noted.    Pt went to the OR on 4/9/18 for a lap fadia. The patient tolerated the procedure well.  The patient was extubated in the OR and transferred to the PACU in stable condition. During the case and in PACU patient with Hypertension up to 200/93. Also Patient with urinary retention likely secondary to current surgical procedure and alcala was inserted. Once stable patient transferred to surgical floor. Diet was advanced as tolerated. The patient's pain was controlled by IV pain medications and then by PO pain medications. Alcala was removed POD # 1.     At the time of discharge, the patient was hemodynamically stable, was tolerating PO diet, was voiding urine, was ambulating, and was comfortable with adequate pain control. The patient was instructed to follow up with Dr. Orta/Denise within 1-2 weeks after discharge from the hospital. The patient/family felt comfortable with discharge. The patient was discharged to home. The patient had no other issues.

## 2018-04-10 NOTE — DISCHARGE NOTE ADULT - ADDITIONAL INSTRUCTIONS
Please follow up with Dr. Vang/ Dr. Orta within one-two weeks after your discharge. You may call [phone] to make an appointment.    Your PMD--Please call for a follow up appointment regarding your recent surgery and elevated blood pressure during hospitalization.

## 2018-04-10 NOTE — DISCHARGE NOTE ADULT - PATIENT PORTAL LINK FT
You can access the [facility] Patient Portal, offered by [facility], by registering with the following website: [email or web address]

## 2018-04-10 NOTE — CONSULT NOTE ADULT - ASSESSMENT
57 y/o female with no sig hx admitted with abd pain found to have acute fadia and noted to have elevated BP     1- elevated BP with no hx of HTN:   suspect sec to pain and not likley long standing however would repeat BP manually  Bilaterally after pain meds .. would also monitor as o/p ( told pt to f/u with Dr. Santiago this week for BP monitoring ) .. can also precribe BP cuff for home monitoring...  2- EKG no acute changes .. Q waves in 3 and avf,.. pt is active and asx ... can f/uw tih dr. Santiago ..   3- acute fadia: s/p lap choly  .. tolerating po..  no BM as of yet.. monitor     OOB /IS /DVT proph   dicsussed with pt and surgical resident   d/c per surgery

## 2018-04-10 NOTE — PROGRESS NOTE ADULT - SUBJECTIVE AND OBJECTIVE BOX
SURGERY PROGRESS NOTE (pager #[phone])    SUBJECTIVE: 59yo Woman seen and examined during morning rounds. No acute events overnight. Afebrile, hypertensive. Pain well controlled with current regimen.     OBJECTIVE:    Physical Exam:  Gen: Resting in bed, NAD, alert and oriented  Resp: airway patent, respirations unlabored, no increased WOB   Abd: soft, NT/ND, incisions c/d/i    Vital Signs Last 24 Hrs  T(C): 37.2 (10 Apr 2018 01:46), Max: 37.2 (10 Apr 2018 01:46)  T(F): 98.9 (10 Apr 2018 01:46), Max: 98.9 (10 Apr 2018 01:46)  HR: 69 (10 Apr 2018 01:46) (56 - 75)  BP: 160/76 (10 Apr 2018 01:46) (128/72 - 216/98)  BP(mean): 121 (09 Apr 2018 17:45) (117 - 145)  RR: 18 (10 Apr 2018 01:46) (11 - 20)  SpO2: 97% (10 Apr 2018 01:46) (93% - 100%)    I&O's Detail    08 Apr 2018 07:01  -  09 Apr 2018 07:00  --------------------------------------------------------  IN:    IV PiggyBack: 150 mL    lactated ringers.: 1300 mL  Total IN: 1450 mL    OUT:    Voided: 750 mL  Total OUT: 750 mL    Total NET: 700 mL      09 Apr 2018 07:01  -  10 Apr 2018 05:37  --------------------------------------------------------  IN:  Total IN: 0 mL    OUT:    Indwelling Catheter - Urethral: 725 mL    Voided: 700 mL  Total OUT: 1425 mL    Total NET: -1425 mL          MEDICATIONS  (STANDING):  dextrose 5% + sodium chloride 0.9% with potassium chloride 20 mEq/L 1000 milliLiter(s) (100 mL/Hr) IV Continuous <Continuous>    MEDICATIONS  (PRN):  acetaminophen   Tablet. 650 milliGRAM(s) Oral every 6 hours PRN Mild Pain (1 - 3)  oxyCODONE    IR 5 milliGRAM(s) Oral every 6 hours PRN Moderate Pain (4 - 6)  oxyCODONE    IR 10 milliGRAM(s) Oral every 6 hours PRN Severe Pain (7 - 10)      LABS:                        12.0   6.20  )-----------( 253      ( 09 Apr 2018 09:26 )             36.8       04-09    140  |  104  |  10  ----------------------------<  99  4.1   |  27  |  1.03    Ca    9.0      09 Apr 2018 07:21  Phos  3.3     04-09  Mg     2.2     04-09    TPro  8.6<H>  /  Alb  5.0  /  TBili  0.8  /  DBili  x   /  AST  17  /  ALT  17  /  AlkPhos  108  04-08

## 2018-04-10 NOTE — CONSULT NOTE ADULT - SUBJECTIVE AND OBJECTIVE BOX
HPI:  58 year old female with past  medical history of seasonal allergies , not currently on meds, history of gallstones a/w acute  abdominal pain, N/v found to have acute fadia and underwent lap fadia.. pt noted to have elevated BP as high as 200 sys howerver improved after pain meds and hydralazine..pt denies hx of HTN, denies smoking but does take caffeine...   mother with hx of HTN   siblings are alive and well      ED: IVF, Morphine, Tylenol, Zofran  PMH: cholelithiasis, appendectomy,  section (15 years ago)  Social: negative for substance abuse, lives with  and daughter, works as an  (2018 17:31)      REVIEW OF SYSTEMS:    CONSTITUTIONAL: No weakness, fevers or chills  EYES/ENT: No visual changes;  No vertigo or throat pain   NECK: No pain or stiffness  RESPIRATORY: No cough, wheezing, hemoptysis; No shortness of breath  CARDIOVASCULAR: No chest pain or palpitations  GASTROINTESTINAL:  abdominal "soarness "  No nausea, vomiting, or hematemesis; no BM today and reports minimal flutuiance   GENITOURINARY: No dysuria, frequency or hematuria  NEUROLOGICAL: No numbness or weakness  All other review of systems is negative unless indicated above.      Medications:   MEDICATIONS  (STANDING):  enoxaparin Injectable 40 milliGRAM(s) SubCutaneous daily    MEDICATIONS  (PRN):  acetaminophen   Tablet. 650 milliGRAM(s) Oral every 6 hours PRN Mild Pain (1 - 3)  oxyCODONE    IR 5 milliGRAM(s) Oral every 6 hours PRN Moderate Pain (4 - 6)  oxyCODONE    IR 10 milliGRAM(s) Oral every 6 hours PRN Severe Pain (7 - 10)      Allergies    penicillin (Hives)    Intolerances        PAST MEDICAL & SURGICAL HISTORY:  S/P  section  S/P appendectomy      Social :    No smoking       No ETOH use            Vital Signs Last 24 Hrs  T(C): 37.3 (10 Apr 2018 10:45), Max: 37.3 (10 Apr 2018 10:45)  T(F): 99.1 (10 Apr 2018 10:45), Max: 99.1 (10 Apr 2018 10:45)  HR: 70 (10 Apr 2018 10:45) (56 - 75)  BP: 149/69 (10 Apr 2018 10:45) (149/69 - 203/99)  BP(mean): 121 (2018 17:45) (117 - 142)  RR: 18 (10 Apr 2018 10:45) (14 - 20)  SpO2: 98% (10 Apr 2018 10:45) (95% - 100%)  CAPILLARY BLOOD GLUCOSE           @ 07:  -  04-10 @ 07:00  --------------------------------------------------------  IN: 1200 mL / OUT: 1800 mL / NET: -600 mL    04-10 @ 07:01  -  04-10 @ 13:29  --------------------------------------------------------  IN: 0 mL / OUT: 300 mL / NET: -300 mL        Physical Exam:    General:  Well appearing, NAD  HEENT:  Nonicteric, PERRLA  CV:  RRR, S1S2   Lungs:  CTA B/L, no wheezes, rales, rhonchi  Abdomen:  Soft,tenderness at surgical site   Extremities:  2+ pulses, no c/c, no edema  Skin:  Warm and dry, no rashes  :  No alcala  Neuro:  AAOx3, non-focal, CN II-XII grossly intact  No Restraints    LABS:                        11.7   10. )-----------( 252      ( 10 Apr 2018 09:48 )             34.8     04-10    141  |  103  |  10  ----------------------------<  112<H>  4.0   |  23  |  0.85    Ca    9.0      10 Apr 2018 07:22  Phos  3.1     04-10  Mg     2.0     04-10

## 2018-04-10 NOTE — DISCHARGE NOTE ADULT - CARE PLAN
Principal Discharge DX:	Cholecystitis  Goal:	wound healing  Assessment and plan of treatment:	Activity- No heavy lifting or straining over 15 lbs for the next two weeks;  Driving- Please do not drive until your pain is well controlled and you do not need to take pain medications.  You may shower-Do not submerge or scrub incision sites.  Please pat dry incisions/dressings.  Leave the white steri strips in place, they will fall off on their own in approximately 5-7 days.

## 2018-04-12 LAB — SURGICAL PATHOLOGY STUDY: SIGNIFICANT CHANGE UP

## 2018-04-18 PROBLEM — K80.20 SYMPTOMATIC CHOLELITHIASIS: Status: RESOLVED | Noted: 2017-08-23 | Resolved: 2018-04-18

## 2018-04-18 PROBLEM — R10.11 ABDOMINAL PAIN, RUQ (RIGHT UPPER QUADRANT): Status: RESOLVED | Noted: 2017-08-23 | Resolved: 2018-04-18

## 2018-04-26 ENCOUNTER — APPOINTMENT (OUTPATIENT)
Dept: SURGERY | Facility: CLINIC | Age: 58
End: 2018-04-26
Payer: COMMERCIAL

## 2018-04-26 VITALS
SYSTOLIC BLOOD PRESSURE: 150 MMHG | HEART RATE: 74 BPM | BODY MASS INDEX: 25.61 KG/M2 | TEMPERATURE: 98.3 F | DIASTOLIC BLOOD PRESSURE: 80 MMHG | RESPIRATION RATE: 16 BRPM | OXYGEN SATURATION: 99 % | HEIGHT: 64 IN | WEIGHT: 150 LBS

## 2018-04-26 DIAGNOSIS — Z87.19 PERSONAL HISTORY OF OTHER DISEASES OF THE DIGESTIVE SYSTEM: ICD-10-CM

## 2018-04-26 PROCEDURE — 99024 POSTOP FOLLOW-UP VISIT: CPT

## 2018-04-26 RX ORDER — OXYCODONE 5 MG/1
5 TABLET ORAL
Qty: 25 | Refills: 0 | Status: DISCONTINUED | COMMUNITY
Start: 2018-04-10

## 2018-05-16 ENCOUNTER — EMERGENCY (EMERGENCY)
Facility: HOSPITAL | Age: 58
LOS: 1 days | End: 2018-05-16
Attending: EMERGENCY MEDICINE | Admitting: EMERGENCY MEDICINE
Payer: COMMERCIAL

## 2018-05-16 VITALS
SYSTOLIC BLOOD PRESSURE: 131 MMHG | RESPIRATION RATE: 16 BRPM | OXYGEN SATURATION: 99 % | DIASTOLIC BLOOD PRESSURE: 78 MMHG | HEART RATE: 59 BPM

## 2018-05-16 VITALS
TEMPERATURE: 99 F | OXYGEN SATURATION: 100 % | WEIGHT: 149.91 LBS | RESPIRATION RATE: 16 BRPM | SYSTOLIC BLOOD PRESSURE: 162 MMHG | DIASTOLIC BLOOD PRESSURE: 86 MMHG | HEART RATE: 77 BPM

## 2018-05-16 DIAGNOSIS — Z98.891 HISTORY OF UTERINE SCAR FROM PREVIOUS SURGERY: Chronic | ICD-10-CM

## 2018-05-16 DIAGNOSIS — Z90.49 ACQUIRED ABSENCE OF OTHER SPECIFIED PARTS OF DIGESTIVE TRACT: Chronic | ICD-10-CM

## 2018-05-16 LAB
ALBUMIN SERPL ELPH-MCNC: 4.3 G/DL — SIGNIFICANT CHANGE UP (ref 3.3–5)
ALP SERPL-CCNC: 86 U/L — SIGNIFICANT CHANGE UP (ref 40–120)
ALT FLD-CCNC: 12 U/L — SIGNIFICANT CHANGE UP (ref 10–45)
ANION GAP SERPL CALC-SCNC: 13 MMOL/L — SIGNIFICANT CHANGE UP (ref 5–17)
APPEARANCE UR: ABNORMAL
APTT BLD: 29.2 SEC — SIGNIFICANT CHANGE UP (ref 27.5–37.4)
AST SERPL-CCNC: 11 U/L — SIGNIFICANT CHANGE UP (ref 10–40)
BILIRUB SERPL-MCNC: 1 MG/DL — SIGNIFICANT CHANGE UP (ref 0.2–1.2)
BILIRUB UR-MCNC: NEGATIVE — SIGNIFICANT CHANGE UP
BUN SERPL-MCNC: 12 MG/DL — SIGNIFICANT CHANGE UP (ref 7–23)
CALCIUM SERPL-MCNC: 9.5 MG/DL — SIGNIFICANT CHANGE UP (ref 8.4–10.5)
CHLORIDE SERPL-SCNC: 104 MMOL/L — SIGNIFICANT CHANGE UP (ref 96–108)
CO2 SERPL-SCNC: 25 MMOL/L — SIGNIFICANT CHANGE UP (ref 22–31)
COLOR SPEC: ABNORMAL
CREAT SERPL-MCNC: 0.88 MG/DL — SIGNIFICANT CHANGE UP (ref 0.5–1.3)
DIFF PNL FLD: ABNORMAL
GLUCOSE SERPL-MCNC: 106 MG/DL — HIGH (ref 70–99)
GLUCOSE UR QL: NEGATIVE — SIGNIFICANT CHANGE UP
HCG SERPL-ACNC: 4.2 MIU/ML — LOW (ref 5–24)
HCT VFR BLD CALC: 36.5 % — SIGNIFICANT CHANGE UP (ref 34.5–45)
HGB BLD-MCNC: 12.6 G/DL — SIGNIFICANT CHANGE UP (ref 11.5–15.5)
INR BLD: 1.04 RATIO — SIGNIFICANT CHANGE UP (ref 0.88–1.16)
KETONES UR-MCNC: NEGATIVE — SIGNIFICANT CHANGE UP
LEUKOCYTE ESTERASE UR-ACNC: ABNORMAL
MCHC RBC-ENTMCNC: 27.2 PG — SIGNIFICANT CHANGE UP (ref 27–34)
MCHC RBC-ENTMCNC: 34.6 GM/DL — SIGNIFICANT CHANGE UP (ref 32–36)
MCV RBC AUTO: 78.5 FL — LOW (ref 80–100)
NITRITE UR-MCNC: NEGATIVE — SIGNIFICANT CHANGE UP
PH UR: 6 — SIGNIFICANT CHANGE UP (ref 5–8)
PLATELET # BLD AUTO: 229 K/UL — SIGNIFICANT CHANGE UP (ref 150–400)
POTASSIUM SERPL-MCNC: 4.1 MMOL/L — SIGNIFICANT CHANGE UP (ref 3.5–5.3)
POTASSIUM SERPL-SCNC: 4.1 MMOL/L — SIGNIFICANT CHANGE UP (ref 3.5–5.3)
PROT SERPL-MCNC: 6.9 G/DL — SIGNIFICANT CHANGE UP (ref 6–8.3)
PROT UR-MCNC: 30 MG/DL
PROTHROM AB SERPL-ACNC: 11.4 SEC — SIGNIFICANT CHANGE UP (ref 9.8–12.7)
RBC # BLD: 4.65 M/UL — SIGNIFICANT CHANGE UP (ref 3.8–5.2)
RBC # FLD: 12.3 % — SIGNIFICANT CHANGE UP (ref 10.3–14.5)
RBC CASTS # UR COMP ASSIST: >50 /HPF (ref 0–2)
SODIUM SERPL-SCNC: 142 MMOL/L — SIGNIFICANT CHANGE UP (ref 135–145)
SP GR SPEC: 1.01 — SIGNIFICANT CHANGE UP (ref 1.01–1.02)
TSH SERPL-MCNC: 2.19 UIU/ML — SIGNIFICANT CHANGE UP (ref 0.27–4.2)
UROBILINOGEN FLD QL: NEGATIVE — SIGNIFICANT CHANGE UP
WBC # BLD: 6.7 K/UL — SIGNIFICANT CHANGE UP (ref 3.8–10.5)
WBC # FLD AUTO: 6.7 K/UL — SIGNIFICANT CHANGE UP (ref 3.8–10.5)
WBC UR QL: SIGNIFICANT CHANGE UP /HPF (ref 0–5)

## 2018-05-16 PROCEDURE — 87086 URINE CULTURE/COLONY COUNT: CPT

## 2018-05-16 PROCEDURE — 80053 COMPREHEN METABOLIC PANEL: CPT

## 2018-05-16 PROCEDURE — 85730 THROMBOPLASTIN TIME PARTIAL: CPT

## 2018-05-16 PROCEDURE — 85027 COMPLETE CBC AUTOMATED: CPT

## 2018-05-16 PROCEDURE — 99284 EMERGENCY DEPT VISIT MOD MDM: CPT

## 2018-05-16 PROCEDURE — 85610 PROTHROMBIN TIME: CPT

## 2018-05-16 PROCEDURE — 81001 URINALYSIS AUTO W/SCOPE: CPT

## 2018-05-16 PROCEDURE — 84443 ASSAY THYROID STIM HORMONE: CPT

## 2018-05-16 PROCEDURE — 84702 CHORIONIC GONADOTROPIN TEST: CPT

## 2018-05-16 RX ORDER — CEFUROXIME AXETIL 250 MG
1 TABLET ORAL
Qty: 14 | Refills: 0 | OUTPATIENT
Start: 2018-05-16 | End: 2018-05-22

## 2018-05-16 NOTE — ED ADULT NURSE NOTE - CHPI ED SYMPTOMS NEG
no chills/no discharge/no pain/no abdominal pain/no fever/no vomiting/no dysuria/no vaginal discharge/no back pain/no nausea

## 2018-05-16 NOTE — ED PROVIDER NOTE - OBJECTIVE STATEMENT
59 yo female with no PMHx p/w vaginal bleeding.  Patient reports that her LMP was 5 yrs ago.  Last night she developed lower abd cramping and vaginal bleeding.  Patient endorsed bleeding through one pad last night and some light bleeding on the pad this AM.  Denies fever/chills, CP, SOB, abd pain, NVDC, dysuria, frequency.  Of note, patient had a cholecystectomy April 9th with uncomplicated post op course.

## 2018-05-16 NOTE — ED ADULT NURSE NOTE - OBJECTIVE STATEMENT
Pt is a 59 yo F who came to the ED amb c/o sudden onset vaginal bleeding last night, used 1 pad. States last night she developed lower abd cramping and vaginal bleeding after exercising. States her LMP was 5 yrs ago. Hx cholecystectomy 4/9/18.  Denies fever/chills, no CP/SOB, no abd pain/n/v/d, no dysuria, frequency, no pain/burning on urination. A/O x3. pelvic exam done by WINIFRED torre.

## 2018-05-16 NOTE — ED PROVIDER NOTE - PLAN OF CARE
1.  Stay well hydrated  2.  Take Ceftin 250mgs every 12 hrs x 7 days  3.  Follow up with your PMD in 2-3 days.  bring a copy of your results with you to your appointment       Follow up in OBGYN clinic call [phone] to make an appointment  4.  Return to the ER for worsening bleeding, pain, fevers/chills or any other concerning symptoms

## 2018-05-16 NOTE — ED PROVIDER NOTE - CARE PLAN
Principal Discharge DX:	Vaginal bleeding  Assessment and plan of treatment:	1.  Stay well hydrated  2.  Take Ceftin 250mgs every 12 hrs x 7 days  3.  Follow up with your PMD in 2-3 days.  bring a copy of your results with you to your appointment       Follow up in OBGYN clinic call [phone] to make an appointment  4.  Return to the ER for worsening bleeding, pain, fevers/chills or any other concerning symptoms  Secondary Diagnosis:	UTI (urinary tract infection)

## 2018-05-16 NOTE — ED PROVIDER NOTE - ATTENDING CONTRIBUTION TO CARE
Attending MD Nash:  I personally have seen and examined this patient.  Resident note reviewed and agree on plan of care and except where noted.  See HPI, PE, and MDM for details. Attending MD Nash:  I personally have seen and examined this patient.  Resident note reviewed and agree on plan of care and except where noted.  See HPI, PE, and MDM for details.      Attending MD Nash: A & O x 3, NAD, EOMI b/l, PERRL b/l; lungs CTAB, heart with reg rhythm without murmur; abdomen soft NTND; extremities with no edema; affect appropriate. neuro exam non focal with no motor or sensory deficits. Attending MD Nash:   I personally have seen and examined this patient.  Physician assistant note reviewed and agree on plan of care and except where noted.  See HPI, PE, and MDM for details.       Attending MD Nash: A & O x 3, NAD, EOMI b/l, PERRL b/l; lungs CTAB, heart with reg rhythm without murmur; abdomen soft NTND; extremities with no edema; affect appropriate. neuro exam non focal with no motor or sensory deficits.

## 2018-05-16 NOTE — ED PROVIDER NOTE - MEDICAL DECISION MAKING DETAILS
Attending MD Nash: 58F post menopausal woman with vaginal bleeding x 1 day, likely DUB. Plan to check screening labs, coags, pelvic exam. Provided labs unrevealing, will refer patient to GYN as outpatient for evaluation should bleeding recur

## 2018-05-16 NOTE — ED PROVIDER NOTE - PHYSICAL EXAMINATION
Gen: AAO x 3, NAD  Skin: No rashes or lesions  HEENT: NC/AT, PERRLA, EOMI, MMM  Resp: unlabored CTAB  Cardiac: rrr s1s2, no murmurs, rubs or gallops  GI: ND, +BS, Soft, NT  Ext: no pedal edema, FROM in all extremities  Neuro: no focal deficits Gen: AAO x 3, NAD  Skin: No rashes or lesions  HEENT: NC/AT, PERRLA, EOMI, MMM  Resp: unlabored CTAB  Cardiac: rrr s1s2, no murmurs, rubs or gallops  GI: ND, +BS, Soft, NT  : blood in vaginal vault.  OS closed.  No lacerations or ulcerations noted.  No discharge.  No CMT or adnexal TTP  Ext: no pedal edema, FROM in all extremities  Neuro: no focal deficits

## 2018-05-17 LAB
CULTURE RESULTS: SIGNIFICANT CHANGE UP
SPECIMEN SOURCE: SIGNIFICANT CHANGE UP

## 2018-05-18 NOTE — ED POST DISCHARGE NOTE - DETAILS
Left VM for patient to return call -Hoda Melissa PA-C 05/19/18 approx 1440. Patient contacted. Patient informed of contaminated urine culture. Patient states she is following up with her GYN. Nilo MALAGON

## 2018-05-23 PROCEDURE — 85027 COMPLETE CBC AUTOMATED: CPT

## 2018-05-23 PROCEDURE — 96376 TX/PRO/DX INJ SAME DRUG ADON: CPT

## 2018-05-23 PROCEDURE — 82803 BLOOD GASES ANY COMBINATION: CPT

## 2018-05-23 PROCEDURE — 84295 ASSAY OF SERUM SODIUM: CPT

## 2018-05-23 PROCEDURE — 83605 ASSAY OF LACTIC ACID: CPT

## 2018-05-23 PROCEDURE — 82947 ASSAY GLUCOSE BLOOD QUANT: CPT

## 2018-05-23 PROCEDURE — 84100 ASSAY OF PHOSPHORUS: CPT

## 2018-05-23 PROCEDURE — 82435 ASSAY OF BLOOD CHLORIDE: CPT

## 2018-05-23 PROCEDURE — 83690 ASSAY OF LIPASE: CPT

## 2018-05-23 PROCEDURE — 86900 BLOOD TYPING SEROLOGIC ABO: CPT

## 2018-05-23 PROCEDURE — 85014 HEMATOCRIT: CPT

## 2018-05-23 PROCEDURE — 85610 PROTHROMBIN TIME: CPT

## 2018-05-23 PROCEDURE — 82330 ASSAY OF CALCIUM: CPT

## 2018-05-23 PROCEDURE — 93005 ELECTROCARDIOGRAM TRACING: CPT

## 2018-05-23 PROCEDURE — 87633 RESP VIRUS 12-25 TARGETS: CPT

## 2018-05-23 PROCEDURE — 80048 BASIC METABOLIC PNL TOTAL CA: CPT

## 2018-05-23 PROCEDURE — 84132 ASSAY OF SERUM POTASSIUM: CPT

## 2018-05-23 PROCEDURE — 80053 COMPREHEN METABOLIC PANEL: CPT

## 2018-05-23 PROCEDURE — 88304 TISSUE EXAM BY PATHOLOGIST: CPT

## 2018-05-23 PROCEDURE — 86850 RBC ANTIBODY SCREEN: CPT

## 2018-05-23 PROCEDURE — 86901 BLOOD TYPING SEROLOGIC RH(D): CPT

## 2018-05-23 PROCEDURE — 74177 CT ABD & PELVIS W/CONTRAST: CPT

## 2018-05-23 PROCEDURE — 96374 THER/PROPH/DIAG INJ IV PUSH: CPT | Mod: XU

## 2018-05-23 PROCEDURE — 96375 TX/PRO/DX INJ NEW DRUG ADDON: CPT

## 2018-05-23 PROCEDURE — 83735 ASSAY OF MAGNESIUM: CPT

## 2018-05-23 PROCEDURE — 87581 M.PNEUMON DNA AMP PROBE: CPT

## 2018-05-23 PROCEDURE — 71045 X-RAY EXAM CHEST 1 VIEW: CPT

## 2018-05-23 PROCEDURE — 87486 CHLMYD PNEUM DNA AMP PROBE: CPT

## 2018-05-23 PROCEDURE — 85730 THROMBOPLASTIN TIME PARTIAL: CPT

## 2018-05-23 PROCEDURE — 87798 DETECT AGENT NOS DNA AMP: CPT

## 2018-05-23 PROCEDURE — 99285 EMERGENCY DEPT VISIT HI MDM: CPT | Mod: 25

## 2018-06-11 ENCOUNTER — OUTPATIENT (OUTPATIENT)
Dept: OUTPATIENT SERVICES | Facility: HOSPITAL | Age: 58
LOS: 1 days | End: 2018-06-11
Payer: COMMERCIAL

## 2018-06-11 DIAGNOSIS — Z98.891 HISTORY OF UTERINE SCAR FROM PREVIOUS SURGERY: Chronic | ICD-10-CM

## 2018-06-11 DIAGNOSIS — Z90.49 ACQUIRED ABSENCE OF OTHER SPECIFIED PARTS OF DIGESTIVE TRACT: Chronic | ICD-10-CM

## 2018-06-11 DIAGNOSIS — N95.0 POSTMENOPAUSAL BLEEDING: ICD-10-CM

## 2018-06-12 ENCOUNTER — RESULT REVIEW (OUTPATIENT)
Age: 58
End: 2018-06-12

## 2018-06-12 ENCOUNTER — APPOINTMENT (OUTPATIENT)
Dept: GYNECOLOGIC ONCOLOGY | Facility: CLINIC | Age: 58
End: 2018-06-12
Payer: COMMERCIAL

## 2018-06-12 DIAGNOSIS — Z80.42 FAMILY HISTORY OF MALIGNANT NEOPLASM OF PROSTATE: ICD-10-CM

## 2018-06-12 DIAGNOSIS — Z87.891 PERSONAL HISTORY OF NICOTINE DEPENDENCE: ICD-10-CM

## 2018-06-12 PROCEDURE — 99205 OFFICE O/P NEW HI 60 MIN: CPT

## 2018-06-12 PROCEDURE — 88321 CONSLTJ&REPRT SLD PREP ELSWR: CPT

## 2018-06-12 RX ORDER — FLUTICASONE PROPIONATE 50 UG/1
SPRAY, METERED NASAL
Refills: 0 | Status: DISCONTINUED | COMMUNITY
End: 2018-06-12

## 2018-06-12 RX ORDER — LORATADINE 5 MG/5 ML
SOLUTION, ORAL ORAL
Refills: 0 | Status: DISCONTINUED | COMMUNITY
End: 2018-06-12

## 2018-06-12 RX ORDER — TURMERIC ROOT EXTRACT 500 MG
TABLET ORAL
Refills: 0 | Status: ACTIVE | COMMUNITY

## 2018-06-14 LAB
CORE LAB BIOPSY: NORMAL
SURGICAL PATHOLOGY STUDY: SIGNIFICANT CHANGE UP

## 2018-06-15 ENCOUNTER — OTHER (OUTPATIENT)
Age: 58
End: 2018-06-15

## 2018-06-20 ENCOUNTER — FORM ENCOUNTER (OUTPATIENT)
Age: 58
End: 2018-06-20

## 2018-06-21 ENCOUNTER — OUTPATIENT (OUTPATIENT)
Dept: OUTPATIENT SERVICES | Facility: HOSPITAL | Age: 58
LOS: 1 days | End: 2018-06-21
Payer: COMMERCIAL

## 2018-06-21 ENCOUNTER — APPOINTMENT (OUTPATIENT)
Dept: MAMMOGRAPHY | Facility: IMAGING CENTER | Age: 58
End: 2018-06-21
Payer: COMMERCIAL

## 2018-06-21 DIAGNOSIS — Z98.891 HISTORY OF UTERINE SCAR FROM PREVIOUS SURGERY: Chronic | ICD-10-CM

## 2018-06-21 DIAGNOSIS — Z00.8 ENCOUNTER FOR OTHER GENERAL EXAMINATION: ICD-10-CM

## 2018-06-21 DIAGNOSIS — Z90.49 ACQUIRED ABSENCE OF OTHER SPECIFIED PARTS OF DIGESTIVE TRACT: Chronic | ICD-10-CM

## 2018-06-21 LAB
ANION GAP SERPL CALC-SCNC: 15 MMOL/L
APTT BLD: 31.6 SEC
BASOPHILS # BLD AUTO: 0.02 K/UL
BASOPHILS NFR BLD AUTO: 0.3 %
BUN SERPL-MCNC: 16 MG/DL
CALCIUM SERPL-MCNC: 9.8 MG/DL
CHLORIDE SERPL-SCNC: 106 MMOL/L
CO2 SERPL-SCNC: 23 MMOL/L
CREAT SERPL-MCNC: 0.95 MG/DL
EOSINOPHIL # BLD AUTO: 0.12 K/UL
EOSINOPHIL NFR BLD AUTO: 2 %
GLUCOSE SERPL-MCNC: 111 MG/DL
HCT VFR BLD CALC: 36.9 %
HGB BLD-MCNC: 11.9 G/DL
IMM GRANULOCYTES NFR BLD AUTO: 0.2 %
INR PPP: 0.96 RATIO
LYMPHOCYTES # BLD AUTO: 2.17 K/UL
LYMPHOCYTES NFR BLD AUTO: 37 %
MAN DIFF?: NORMAL
MCHC RBC-ENTMCNC: 26 PG
MCHC RBC-ENTMCNC: 32.2 GM/DL
MCV RBC AUTO: 80.7 FL
MONOCYTES # BLD AUTO: 0.3 K/UL
MONOCYTES NFR BLD AUTO: 5.1 %
NEUTROPHILS # BLD AUTO: 3.25 K/UL
NEUTROPHILS NFR BLD AUTO: 55.4 %
PLATELET # BLD AUTO: 311 K/UL
POTASSIUM SERPL-SCNC: 4.7 MMOL/L
PT BLD: 10.8 SEC
RBC # BLD: 4.57 M/UL
RBC # FLD: 15.2 %
SODIUM SERPL-SCNC: 144 MMOL/L
WBC # FLD AUTO: 5.87 K/UL

## 2018-06-21 PROCEDURE — 77067 SCR MAMMO BI INCL CAD: CPT | Mod: 26

## 2018-06-21 PROCEDURE — 77063 BREAST TOMOSYNTHESIS BI: CPT

## 2018-06-21 PROCEDURE — 77063 BREAST TOMOSYNTHESIS BI: CPT | Mod: 26

## 2018-06-21 PROCEDURE — 77067 SCR MAMMO BI INCL CAD: CPT

## 2018-06-26 ENCOUNTER — FORM ENCOUNTER (OUTPATIENT)
Age: 58
End: 2018-06-26

## 2018-06-27 ENCOUNTER — OUTPATIENT (OUTPATIENT)
Dept: OUTPATIENT SERVICES | Facility: HOSPITAL | Age: 58
LOS: 1 days | End: 2018-06-27
Payer: COMMERCIAL

## 2018-06-27 ENCOUNTER — APPOINTMENT (OUTPATIENT)
Dept: CT IMAGING | Facility: HOSPITAL | Age: 58
End: 2018-06-27

## 2018-06-27 ENCOUNTER — RESULT REVIEW (OUTPATIENT)
Age: 58
End: 2018-06-27

## 2018-06-27 DIAGNOSIS — R91.8 OTHER NONSPECIFIC ABNORMAL FINDING OF LUNG FIELD: ICD-10-CM

## 2018-06-27 DIAGNOSIS — C54.1 MALIGNANT NEOPLASM OF ENDOMETRIUM: ICD-10-CM

## 2018-06-27 DIAGNOSIS — R22.2 LOCALIZED SWELLING, MASS AND LUMP, TRUNK: ICD-10-CM

## 2018-06-27 DIAGNOSIS — Z90.49 ACQUIRED ABSENCE OF OTHER SPECIFIED PARTS OF DIGESTIVE TRACT: Chronic | ICD-10-CM

## 2018-06-27 DIAGNOSIS — Z98.891 HISTORY OF UTERINE SCAR FROM PREVIOUS SURGERY: Chronic | ICD-10-CM

## 2018-06-27 PROCEDURE — 88173 CYTOPATH EVAL FNA REPORT: CPT | Mod: 26

## 2018-06-27 PROCEDURE — 77012 CT SCAN FOR NEEDLE BIOPSY: CPT

## 2018-06-27 PROCEDURE — 20225 BONE BIOPSY TROCAR/NDL DEEP: CPT

## 2018-06-27 PROCEDURE — 77012 CT SCAN FOR NEEDLE BIOPSY: CPT | Mod: 26

## 2018-06-27 PROCEDURE — 88173 CYTOPATH EVAL FNA REPORT: CPT

## 2018-06-27 PROCEDURE — 88305 TISSUE EXAM BY PATHOLOGIST: CPT | Mod: 26

## 2018-06-27 PROCEDURE — 88305 TISSUE EXAM BY PATHOLOGIST: CPT

## 2018-06-27 PROCEDURE — 88172 CYTP DX EVAL FNA 1ST EA SITE: CPT

## 2018-06-29 LAB — NON-GYNECOLOGICAL CYTOLOGY STUDY: SIGNIFICANT CHANGE UP

## 2018-07-02 ENCOUNTER — FORM ENCOUNTER (OUTPATIENT)
Age: 58
End: 2018-07-02

## 2018-07-03 ENCOUNTER — APPOINTMENT (OUTPATIENT)
Dept: CT IMAGING | Facility: IMAGING CENTER | Age: 58
End: 2018-07-03
Payer: COMMERCIAL

## 2018-07-03 ENCOUNTER — OUTPATIENT (OUTPATIENT)
Dept: OUTPATIENT SERVICES | Facility: HOSPITAL | Age: 58
LOS: 1 days | End: 2018-07-03
Payer: COMMERCIAL

## 2018-07-03 DIAGNOSIS — Z00.8 ENCOUNTER FOR OTHER GENERAL EXAMINATION: ICD-10-CM

## 2018-07-03 DIAGNOSIS — Z90.49 ACQUIRED ABSENCE OF OTHER SPECIFIED PARTS OF DIGESTIVE TRACT: Chronic | ICD-10-CM

## 2018-07-03 DIAGNOSIS — C54.1 MALIGNANT NEOPLASM OF ENDOMETRIUM: ICD-10-CM

## 2018-07-03 DIAGNOSIS — Z98.891 HISTORY OF UTERINE SCAR FROM PREVIOUS SURGERY: Chronic | ICD-10-CM

## 2018-07-03 PROCEDURE — 71260 CT THORAX DX C+: CPT

## 2018-07-03 PROCEDURE — 74177 CT ABD & PELVIS W/CONTRAST: CPT

## 2018-07-03 PROCEDURE — 71260 CT THORAX DX C+: CPT | Mod: 26

## 2018-07-03 PROCEDURE — 74177 CT ABD & PELVIS W/CONTRAST: CPT | Mod: 26

## 2018-07-06 ENCOUNTER — OTHER (OUTPATIENT)
Age: 58
End: 2018-07-06

## 2018-07-17 ENCOUNTER — TRANSCRIPTION ENCOUNTER (OUTPATIENT)
Age: 58
End: 2018-07-17

## 2018-07-18 ENCOUNTER — TRANSCRIPTION ENCOUNTER (OUTPATIENT)
Age: 58
End: 2018-07-18

## 2018-07-18 ENCOUNTER — OUTPATIENT (OUTPATIENT)
Dept: OUTPATIENT SERVICES | Facility: HOSPITAL | Age: 58
LOS: 1 days | End: 2018-07-18

## 2018-07-18 VITALS
SYSTOLIC BLOOD PRESSURE: 140 MMHG | WEIGHT: 143.96 LBS | HEIGHT: 63 IN | HEART RATE: 72 BPM | TEMPERATURE: 99 F | RESPIRATION RATE: 16 BRPM | DIASTOLIC BLOOD PRESSURE: 80 MMHG

## 2018-07-18 DIAGNOSIS — C54.1 MALIGNANT NEOPLASM OF ENDOMETRIUM: ICD-10-CM

## 2018-07-18 DIAGNOSIS — Z98.890 OTHER SPECIFIED POSTPROCEDURAL STATES: Chronic | ICD-10-CM

## 2018-07-18 DIAGNOSIS — Z98.891 HISTORY OF UTERINE SCAR FROM PREVIOUS SURGERY: Chronic | ICD-10-CM

## 2018-07-18 DIAGNOSIS — Z90.49 ACQUIRED ABSENCE OF OTHER SPECIFIED PARTS OF DIGESTIVE TRACT: Chronic | ICD-10-CM

## 2018-07-18 LAB
BLD GP AB SCN SERPL QL: NEGATIVE — SIGNIFICANT CHANGE UP
BUN SERPL-MCNC: 14 MG/DL — SIGNIFICANT CHANGE UP (ref 7–23)
CALCIUM SERPL-MCNC: 9.4 MG/DL — SIGNIFICANT CHANGE UP (ref 8.4–10.5)
CHLORIDE SERPL-SCNC: 102 MMOL/L — SIGNIFICANT CHANGE UP (ref 98–107)
CO2 SERPL-SCNC: 26 MMOL/L — SIGNIFICANT CHANGE UP (ref 22–31)
CREAT SERPL-MCNC: 0.85 MG/DL — SIGNIFICANT CHANGE UP (ref 0.5–1.3)
GLUCOSE SERPL-MCNC: 87 MG/DL — SIGNIFICANT CHANGE UP (ref 70–99)
HBA1C BLD-MCNC: 5.3 % — SIGNIFICANT CHANGE UP (ref 4–5.6)
HCT VFR BLD CALC: 33.5 % — LOW (ref 34.5–45)
HGB BLD-MCNC: 10.7 G/DL — LOW (ref 11.5–15.5)
MCHC RBC-ENTMCNC: 25 PG — LOW (ref 27–34)
MCHC RBC-ENTMCNC: 31.9 % — LOW (ref 32–36)
MCV RBC AUTO: 78.3 FL — LOW (ref 80–100)
NRBC # FLD: 0 — SIGNIFICANT CHANGE UP
PLATELET # BLD AUTO: 335 K/UL — SIGNIFICANT CHANGE UP (ref 150–400)
PMV BLD: 9.7 FL — SIGNIFICANT CHANGE UP (ref 7–13)
POTASSIUM SERPL-MCNC: 3.9 MMOL/L — SIGNIFICANT CHANGE UP (ref 3.5–5.3)
POTASSIUM SERPL-SCNC: 3.9 MMOL/L — SIGNIFICANT CHANGE UP (ref 3.5–5.3)
RBC # BLD: 4.28 M/UL — SIGNIFICANT CHANGE UP (ref 3.8–5.2)
RBC # FLD: 13.4 % — SIGNIFICANT CHANGE UP (ref 10.3–14.5)
RH IG SCN BLD-IMP: NEGATIVE — SIGNIFICANT CHANGE UP
SODIUM SERPL-SCNC: 142 MMOL/L — SIGNIFICANT CHANGE UP (ref 135–145)
WBC # BLD: 6.22 K/UL — SIGNIFICANT CHANGE UP (ref 3.8–10.5)
WBC # FLD AUTO: 6.22 K/UL — SIGNIFICANT CHANGE UP (ref 3.8–10.5)

## 2018-07-18 RX ORDER — SODIUM CHLORIDE 9 MG/ML
3 INJECTION INTRAMUSCULAR; INTRAVENOUS; SUBCUTANEOUS EVERY 8 HOURS
Qty: 0 | Refills: 0 | Status: DISCONTINUED | OUTPATIENT
Start: 2018-07-19 | End: 2018-07-20

## 2018-07-18 RX ORDER — SODIUM CHLORIDE 9 MG/ML
1000 INJECTION, SOLUTION INTRAVENOUS
Qty: 0 | Refills: 0 | Status: DISCONTINUED | OUTPATIENT
Start: 2018-07-19 | End: 2018-07-20

## 2018-07-18 NOTE — H&P PST ADULT - PROBLEM SELECTOR PLAN 1
Scheduled for Robotic Total Abdominal Hysterectomy, Bilateral Salpingo Oophorectomy, [place] Lymph Node Mapping and Staging on 7/19/2018.    Preop instructions given, pt verbalized understanding   Chlorhexidine wash and GI prophylaxis provided

## 2018-07-18 NOTE — H&P PST ADULT - NEGATIVE ENMT SYMPTOMS
no vertigo/no dysphagia/no hearing difficulty/no ear pain/no gum bleeding/no post-nasal discharge/no tinnitus/no nose bleeds

## 2018-07-18 NOTE — H&P PST ADULT - NSANTHOSAYNRD_GEN_A_CORE
No. LUL screening performed.  STOP BANG Legend: 0-2 = LOW Risk; 3-4 = INTERMEDIATE Risk; 5-8 = HIGH Risk

## 2018-07-18 NOTE — H&P PST ADULT - FAMILY HISTORY
Mother  Still living? Unknown  Family history of colon cancer in mother, Age at diagnosis: Age Unknown     Father  Still living? Unknown  Family history of prostate cancer in father, Age at diagnosis: Age Unknown

## 2018-07-18 NOTE — H&P PST ADULT - HISTORY OF PRESENT ILLNESS
57 y/o female presents to PST for preoperative evaluation with dx of malignant neoplasm of endometrium. Pt presented to her GYN reporting postmenopausal bleeding 1 month after her cholecystectomy. Sonogram and uterine biopsy performed- endometrial cancer confirmed. Pt went to Dr. Fuentes for a second opinion. Scheduled for 59 y/o female presents to [facility] for preoperative evaluation with dx of malignant neoplasm of endometrium. Pt presented to her GYN reporting postmenopausal bleeding 1 month after her cholecystectomy. Vaginal Sonogram and uterine biopsy performed- endometrial cancer confirmed. Seen by Dr. Fuentes for further evaluation. Scheduled for Robotic Total Abdominal Hysterectomy, Bilateral Salpingo Oophorectomy, [place] Lymph Node Mapping and Staging on 7/19/2018.

## 2018-07-18 NOTE — H&P PST ADULT - NEGATIVE NEUROLOGICAL SYMPTOMS
no headache/no confusion/no transient paralysis/no weakness/no hemiparesis/no facial palsy/no focal seizures/no syncope/no paresthesias/no generalized seizures/no loss of sensation/no vertigo/no difficulty walking/no loss of consciousness/no tremors

## 2018-07-18 NOTE — H&P PST ADULT - PSH
H/O laser iridotomy  2011  History of cholecystectomy    History of trabeculectomy    S/P appendectomy    S/P  section

## 2018-07-18 NOTE — H&P PST ADULT - RS GEN PE MLT RESP DETAILS PC
respirations non-labored/airway patent/clear to auscultation bilaterally/breath sounds equal/good air movement/no chest wall tenderness

## 2018-07-19 ENCOUNTER — INPATIENT (INPATIENT)
Facility: HOSPITAL | Age: 58
LOS: 0 days | Discharge: HOME | End: 2018-07-20
Attending: OBSTETRICS & GYNECOLOGY | Admitting: OBSTETRICS & GYNECOLOGY
Payer: COMMERCIAL

## 2018-07-19 ENCOUNTER — TRANSCRIPTION ENCOUNTER (OUTPATIENT)
Age: 58
End: 2018-07-19

## 2018-07-19 ENCOUNTER — RESULT REVIEW (OUTPATIENT)
Age: 58
End: 2018-07-19

## 2018-07-19 ENCOUNTER — APPOINTMENT (OUTPATIENT)
Dept: GYNECOLOGIC ONCOLOGY | Facility: HOSPITAL | Age: 58
End: 2018-07-19

## 2018-07-19 VITALS
SYSTOLIC BLOOD PRESSURE: 136 MMHG | HEART RATE: 63 BPM | HEIGHT: 63 IN | WEIGHT: 143.96 LBS | OXYGEN SATURATION: 100 % | DIASTOLIC BLOOD PRESSURE: 79 MMHG | RESPIRATION RATE: 16 BRPM | TEMPERATURE: 98 F

## 2018-07-19 DIAGNOSIS — Z98.890 OTHER SPECIFIED POSTPROCEDURAL STATES: Chronic | ICD-10-CM

## 2018-07-19 DIAGNOSIS — Z90.49 ACQUIRED ABSENCE OF OTHER SPECIFIED PARTS OF DIGESTIVE TRACT: Chronic | ICD-10-CM

## 2018-07-19 DIAGNOSIS — Z98.891 HISTORY OF UTERINE SCAR FROM PREVIOUS SURGERY: Chronic | ICD-10-CM

## 2018-07-19 DIAGNOSIS — C54.1 MALIGNANT NEOPLASM OF ENDOMETRIUM: ICD-10-CM

## 2018-07-19 LAB
GLUCOSE BLDC GLUCOMTR-MCNC: 91 MG/DL — SIGNIFICANT CHANGE UP (ref 70–99)
HCT VFR BLD CALC: 32.9 % — LOW (ref 34.5–45)
HGB BLD-MCNC: 10.9 G/DL — LOW (ref 11.5–15.5)
MCHC RBC-ENTMCNC: 25.6 PG — LOW (ref 27–34)
MCHC RBC-ENTMCNC: 33.1 % — SIGNIFICANT CHANGE UP (ref 32–36)
MCV RBC AUTO: 77.4 FL — LOW (ref 80–100)
NRBC # FLD: 0 — SIGNIFICANT CHANGE UP
PLATELET # BLD AUTO: 319 K/UL — SIGNIFICANT CHANGE UP (ref 150–400)
PMV BLD: 9.7 FL — SIGNIFICANT CHANGE UP (ref 7–13)
RBC # BLD: 4.25 M/UL — SIGNIFICANT CHANGE UP (ref 3.8–5.2)
RBC # FLD: 13.2 % — SIGNIFICANT CHANGE UP (ref 10.3–14.5)
RH IG SCN BLD-IMP: NEGATIVE — SIGNIFICANT CHANGE UP
WBC # BLD: 12.36 K/UL — HIGH (ref 3.8–10.5)
WBC # FLD AUTO: 12.36 K/UL — HIGH (ref 3.8–10.5)

## 2018-07-19 PROCEDURE — 58573 TLH W/T/O UTERUS OVER 250 G: CPT

## 2018-07-19 PROCEDURE — S2900 ROBOTIC SURGICAL SYSTEM: CPT | Mod: NC

## 2018-07-19 PROCEDURE — 88112 CYTOPATH CELL ENHANCE TECH: CPT | Mod: 26

## 2018-07-19 PROCEDURE — 88341 IMHCHEM/IMCYTCHM EA ADD ANTB: CPT | Mod: 26,59

## 2018-07-19 PROCEDURE — 88342 IMHCHEM/IMCYTCHM 1ST ANTB: CPT | Mod: 26,59

## 2018-07-19 PROCEDURE — 88309 TISSUE EXAM BY PATHOLOGIST: CPT | Mod: 26

## 2018-07-19 PROCEDURE — 38572 LAPAROSCOPY LYMPHADENECTOMY: CPT

## 2018-07-19 PROCEDURE — 88307 TISSUE EXAM BY PATHOLOGIST: CPT | Mod: 26

## 2018-07-19 PROCEDURE — 88360 TUMOR IMMUNOHISTOCHEM/MANUAL: CPT | Mod: 26

## 2018-07-19 RX ORDER — SODIUM CHLORIDE 9 MG/ML
1000 INJECTION, SOLUTION INTRAVENOUS
Qty: 0 | Refills: 0 | Status: DISCONTINUED | OUTPATIENT
Start: 2018-07-19 | End: 2018-07-20

## 2018-07-19 RX ORDER — IBUPROFEN 200 MG
400 TABLET ORAL EVERY 6 HOURS
Qty: 0 | Refills: 0 | Status: DISCONTINUED | OUTPATIENT
Start: 2018-07-19 | End: 2018-07-20

## 2018-07-19 RX ORDER — HEPARIN SODIUM 5000 [USP'U]/ML
5000 INJECTION INTRAVENOUS; SUBCUTANEOUS ONCE
Qty: 0 | Refills: 0 | Status: COMPLETED | OUTPATIENT
Start: 2018-07-19 | End: 2018-07-19

## 2018-07-19 RX ORDER — ONDANSETRON 8 MG/1
4 TABLET, FILM COATED ORAL ONCE
Qty: 0 | Refills: 0 | Status: DISCONTINUED | OUTPATIENT
Start: 2018-07-19 | End: 2018-07-20

## 2018-07-19 RX ORDER — OXYCODONE HYDROCHLORIDE 5 MG/1
5 TABLET ORAL EVERY 4 HOURS
Qty: 0 | Refills: 0 | Status: DISCONTINUED | OUTPATIENT
Start: 2018-07-19 | End: 2018-07-20

## 2018-07-19 RX ORDER — ACETAMINOPHEN 500 MG
325 TABLET ORAL EVERY 4 HOURS
Qty: 0 | Refills: 0 | Status: DISCONTINUED | OUTPATIENT
Start: 2018-07-19 | End: 2018-07-19

## 2018-07-19 RX ORDER — HYDROMORPHONE HYDROCHLORIDE 2 MG/ML
1 INJECTION INTRAMUSCULAR; INTRAVENOUS; SUBCUTANEOUS
Qty: 0 | Refills: 0 | Status: DISCONTINUED | OUTPATIENT
Start: 2018-07-19 | End: 2018-07-20

## 2018-07-19 RX ORDER — ACETAMINOPHEN 500 MG
650 TABLET ORAL EVERY 6 HOURS
Qty: 0 | Refills: 0 | Status: DISCONTINUED | OUTPATIENT
Start: 2018-07-19 | End: 2018-07-20

## 2018-07-19 RX ORDER — ENOXAPARIN SODIUM 100 MG/ML
40 INJECTION SUBCUTANEOUS DAILY
Qty: 0 | Refills: 0 | Status: DISCONTINUED | OUTPATIENT
Start: 2018-07-19 | End: 2018-07-20

## 2018-07-19 RX ADMIN — HEPARIN SODIUM 5000 UNIT(S): 5000 INJECTION INTRAVENOUS; SUBCUTANEOUS at 14:31

## 2018-07-19 RX ADMIN — SODIUM CHLORIDE 3 MILLILITER(S): 9 INJECTION INTRAMUSCULAR; INTRAVENOUS; SUBCUTANEOUS at 22:03

## 2018-07-19 RX ADMIN — HYDROMORPHONE HYDROCHLORIDE 1 MILLIGRAM(S): 2 INJECTION INTRAMUSCULAR; INTRAVENOUS; SUBCUTANEOUS at 21:20

## 2018-07-19 RX ADMIN — SODIUM CHLORIDE 30 MILLILITER(S): 9 INJECTION, SOLUTION INTRAVENOUS at 14:31

## 2018-07-19 RX ADMIN — SODIUM CHLORIDE 125 MILLILITER(S): 9 INJECTION, SOLUTION INTRAVENOUS at 19:30

## 2018-07-19 RX ADMIN — SODIUM CHLORIDE 30 MILLILITER(S): 9 INJECTION, SOLUTION INTRAVENOUS at 19:34

## 2018-07-19 RX ADMIN — HYDROMORPHONE HYDROCHLORIDE 1 MILLIGRAM(S): 2 INJECTION INTRAMUSCULAR; INTRAVENOUS; SUBCUTANEOUS at 20:45

## 2018-07-19 NOTE — DISCHARGE NOTE ADULT - PLAN OF CARE
Recovery Make your follow-up appointment with your doctor in 2 weeks after discharge. No heavy lifting, driving, or strenuous activity for 6 weeks. Nothing per vagina such as tampons, intercourse, douches or tub baths for 6 weeks or until you see your doctor. Call your doctor with any signs and symptoms of infection such as fever, chills, nausea, or vomiting. Call your doctor with redness or swelling at the incision site, fluid leakage, or wound separation. Call your doctor if you're unable to tolerate food, have an increase in vaginal bleeding, or have difficulty urinating. Call your doctor if you have pain that is not relieved by your prescribed medications. Notify your doctor with any other concerns.

## 2018-07-19 NOTE — DISCHARGE NOTE ADULT - PATIENT PORTAL LINK FT
You can access the [facility] Patient Portal, offered by [facility], by registering with the following website: [email or web address]

## 2018-07-19 NOTE — DISCHARGE NOTE ADULT - MEDICATION SUMMARY - MEDICATIONS TO TAKE
I will START or STAY ON the medications listed below when I get home from the hospital:    acetaminophen 325 mg oral tablet  -- 2 tab(s) by mouth every 6 hours, As needed, Mild Pain (1 - 3)  -- Indication: For Pain    ibuprofen 400 mg oral tablet  -- 1 tab(s) by mouth every 6 hours, As needed, moderate pain  -- Indication: For Pain    oxyCODONE 5 mg oral tablet  -- 1 tab(s) by mouth every 4 hours, As needed, Severe Pain (7 - 10)  -- Indication: For Pain I will START or STAY ON the medications listed below when I get home from the hospital:    acetaminophen 325 mg oral tablet  -- 2 tab(s) by mouth every 6 hours, As needed, Mild Pain (1 - 3)  -- Indication: For Pain    ibuprofen 400 mg oral tablet  -- 1 tab(s) by mouth every 6 hours, As needed, moderate pain  -- Indication: For Pain    oxyCODONE 5 mg oral tablet  -- 1 tab(s) by mouth every 4 hours, As Needed -Severe Pain (7 - 10) - for severe pain MDD:5   -- Indication: For Severe pain

## 2018-07-19 NOTE — DISCHARGE NOTE ADULT - INSTRUCTIONS
Advance as tolerated Watch for signs of infection; redness, swelling, fever, chills or heat, report such symptoms to the MD. No driving while taking pain medication, it causes drowsiness & constipation. Drink 6-8 glasses of fluids daily to promote hydration. No heavy lifting, pulling or pushing heavy objects. Follow up with primary & surgical MD.

## 2018-07-19 NOTE — PROGRESS NOTE ADULT - ASSESSMENT
59 Y/O S/P Robotic TLH, BSO, PPALND, Vaginal Laceration repair  Plan  1. Patient encouraged to use Incentive Spirometer  2. Pain management; Tylenol, Motrin & Oxycodone PRN  3. Regular diet  4. DTV  5. IVFluids LR @ 125mL/hr  6. Lovenox 40 mg subcut daily

## 2018-07-19 NOTE — PROGRESS NOTE ADULT - SUBJECTIVE AND OBJECTIVE BOX
PA GYN/ONC POST OP NOTE:    Pt resting comfortably in PACU, awake & alert, medicated earlier with dose of Dilaudid and pain control is adequate. Pt denies nausea/vomiting and is tolerating ice chips and sips of water.  Pt had vag. packing that was removed now  and Valencia catheter was just D/C'd too and Pt is DTV.    Vital Signs Last 24 Hrs  T(C): 36.6 (19 Jul 2018 19:10), Max: 36.7 (19 Jul 2018 13:38)  T(F): 97.9 (19 Jul 2018 19:10), Max: 98.1 (19 Jul 2018 13:38)  HR: 62 (19 Jul 2018 22:30) (51 - 69)  BP: 138/77 (19 Jul 2018 22:00) (117/75 - 157/65)  BP(mean): --  RR: 16 (19 Jul 2018 22:30) (12 - 22)  SpO2: 96% (19 Jul 2018 22:30) (94% - 100%)    U/O:    I&O's Detail    19 Jul 2018 07:01  -  19 Jul 2018 23:30  --------------------------------------------------------  IN:    lactated ringers.: 30 mL    lactated ringers.: 625 mL  Total IN: 655 mL    OUT:    Indwelling Catheter - Urethral: 460 mL  Total OUT: 460 mL    Total NET: 195 mL          PHYSICAL EXAM:  CHEST/LUNG: CTA B/L  HEART: S1S2 RRR  ABDOMEN: Soft, appropriate tenderness  INCISION: Scope sites C/D/I  EXTREMITIES: NT B/L, Pt has Venodynes on for DVT ppx     LABS:                        10.9   x     )-----------( 319      ( 19 Jul 2018 22:45 )             32.9     07-18    142  |  102  |  14  ----------------------------<  87  3.9   |  26  |  0.85    Ca    9.4      18 Jul 2018 10:30            MEDICATIONS  (STANDING):  enoxaparin Injectable 40 milliGRAM(s) SubCutaneous daily  lactated ringers. 1000 milliLiter(s) (30 mL/Hr) IV Continuous <Continuous>  lactated ringers. 1000 milliLiter(s) (125 mL/Hr) IV Continuous <Continuous>  sodium chloride 0.9% lock flush 3 milliLiter(s) IV Push every 8 hours    MEDICATIONS  (PRN):  acetaminophen   Tablet. 650 milliGRAM(s) Oral every 6 hours PRN Mild Pain (1 - 3)  HYDROmorphone  Injectable 1 milliGRAM(s) IV Push every 10 minutes PRN Moderate Pain  ibuprofen  Tablet 400 milliGRAM(s) Oral every 6 hours PRN moderate pain  ondansetron Injectable 4 milliGRAM(s) IV Push once PRN Nausea and/or Vomiting  oxyCODONE    IR 5 milliGRAM(s) Oral every 4 hours PRN Severe Pain (7 - 10)

## 2018-07-19 NOTE — DISCHARGE NOTE ADULT - CARE PROVIDER_API CALL
Pao Fuentes), Gynecologic Oncology; Obstetrics and Gynecology  [address] [place] [address]  Phone: [phone]  Fax: [phone]

## 2018-07-19 NOTE — BRIEF OPERATIVE NOTE - SPECIMENS
cervix, uterus, bilateral tubes, bilateral ovaries, left sentinel lymph nodes, right sentinel lymph nodes, left periaortic nodes, right periaortic nodes

## 2018-07-19 NOTE — DISCHARGE NOTE ADULT - HOSPITAL COURSE
Patient underwent an uncomplicated RA-TLH, BSO, PPALND, vaginal laceration repair for endometrial carcinoma EBL: 50  . Hct:    .  POD#0 Pain was well controlled with oral analgesics and patient tolerated regular diet. Valencia was discontinued and patient voided spontaneously. POD#1 No acute events. Upon discharge on POD# , the patient is ambulating, voiding spontaneously, tolerating oral intake, pain was well controlled with oral medication, and vital signs were stable. Patient to have close follow up with Dr. Fuentes Patient underwent an uncomplicated RA-TLH, BSO, PPALND, vaginal laceration repair for endometrial carcinoma EBL: 50  . Hct: 33.5->32.9   .  POD#0 Pain was well controlled with oral analgesics and patient tolerated regular diet. Valencia was discontinued and patient voided spontaneously. POD#1 No acute events. Upon discharge on POD#1 , the patient is ambulating, voiding spontaneously, tolerating oral intake, pain was well controlled with oral medication, and vital signs were stable. Patient to have close follow up with Dr. Fuentes Patient underwent an uncomplicated RA-TLH, BSO, PPALND for endometrial carcinoma EBL: 50  . Hct: 33.5->32.9   .  POD#0 Pain was well controlled with oral analgesics and patient tolerated regular diet. Valencia was discontinued and patient voided spontaneously. POD#1 No acute events. Upon discharge on POD#1 , the patient is ambulating, voiding spontaneously, tolerating oral intake, pain was well controlled with oral medication, and vital signs were stable. Patient to have close follow up with Dr. Fuentes

## 2018-07-19 NOTE — DISCHARGE NOTE ADULT - CONDITIONS AT DISCHARGE
Alert & oriented. Out of bed ambulating. Tolerating diet without nausea or vomiting. Voiding without difficulty. Abdominal lap sites x4 with gauze and tega derm remain clean, dry and intact. NO signs and symptoms of infection noted. Vitals stable, afebrile. Understands all discharge instruction & will follow up with the MD. Time allowed for questions.

## 2018-07-19 NOTE — DISCHARGE NOTE ADULT - CARE PLAN
Principal Discharge DX:	Postoperative state  Goal:	Recovery  Assessment and plan of treatment:	Make your follow-up appointment with your doctor in 2 weeks after discharge. No heavy lifting, driving, or strenuous activity for 6 weeks. Nothing per vagina such as tampons, intercourse, douches or tub baths for 6 weeks or until you see your doctor. Call your doctor with any signs and symptoms of infection such as fever, chills, nausea, or vomiting. Call your doctor with redness or swelling at the incision site, fluid leakage, or wound separation. Call your doctor if you're unable to tolerate food, have an increase in vaginal bleeding, or have difficulty urinating. Call your doctor if you have pain that is not relieved by your prescribed medications. Notify your doctor with any other concerns.

## 2018-07-19 NOTE — ASU PREOP CHECKLIST - SELECT TESTS ORDERED
CBC/BMP/Type and Cross/hga1c/Type and Screen Type and Cross/Type and Screen/BMP/CBC/hga1c/POCT Blood Glucose

## 2018-07-19 NOTE — ASU PATIENT PROFILE, ADULT - VISION (WITH CORRECTIVE LENSES IF THE PATIENT USUALLY WEARS THEM):
reading glasses - OTC/Normal vision: sees adequately in most situations; can see medication labels, newsprint

## 2018-07-19 NOTE — BRIEF OPERATIVE NOTE - PROCEDURE
<<-----Click on this checkbox to enter Procedure Bilateral pelvic and periaortic lymph node dissection  07/19/2018    Active  [email or web address]  Bilateral salpingo-oophorectomy  07/19/2018    Active  [id number]  Robotic assisted hysterectomy  07/19/2018    Active  [email or web address]

## 2018-07-20 VITALS
TEMPERATURE: 98 F | RESPIRATION RATE: 19 BRPM | DIASTOLIC BLOOD PRESSURE: 52 MMHG | SYSTOLIC BLOOD PRESSURE: 107 MMHG | OXYGEN SATURATION: 100 % | HEART RATE: 60 BPM

## 2018-07-20 DIAGNOSIS — Z98.890 OTHER SPECIFIED POSTPROCEDURAL STATES: ICD-10-CM

## 2018-07-20 LAB
BASOPHILS # BLD AUTO: 0.01 K/UL — SIGNIFICANT CHANGE UP (ref 0–0.2)
BASOPHILS NFR BLD AUTO: 0.1 % — SIGNIFICANT CHANGE UP (ref 0–2)
BUN SERPL-MCNC: 13 MG/DL — SIGNIFICANT CHANGE UP (ref 7–23)
CALCIUM SERPL-MCNC: 8.6 MG/DL — SIGNIFICANT CHANGE UP (ref 8.4–10.5)
CHLORIDE SERPL-SCNC: 103 MMOL/L — SIGNIFICANT CHANGE UP (ref 98–107)
CO2 SERPL-SCNC: 22 MMOL/L — SIGNIFICANT CHANGE UP (ref 22–31)
CREAT SERPL-MCNC: 0.73 MG/DL — SIGNIFICANT CHANGE UP (ref 0.5–1.3)
EOSINOPHIL # BLD AUTO: 0 K/UL — SIGNIFICANT CHANGE UP (ref 0–0.5)
EOSINOPHIL NFR BLD AUTO: 0 % — SIGNIFICANT CHANGE UP (ref 0–6)
GLUCOSE SERPL-MCNC: 140 MG/DL — HIGH (ref 70–99)
HCT VFR BLD CALC: 29.6 % — LOW (ref 34.5–45)
HGB BLD-MCNC: 9.7 G/DL — LOW (ref 11.5–15.5)
IMM GRANULOCYTES # BLD AUTO: 0.06 # — SIGNIFICANT CHANGE UP
IMM GRANULOCYTES NFR BLD AUTO: 0.6 % — SIGNIFICANT CHANGE UP (ref 0–1.5)
LYMPHOCYTES # BLD AUTO: 0.77 K/UL — LOW (ref 1–3.3)
LYMPHOCYTES # BLD AUTO: 7.8 % — LOW (ref 13–44)
MAGNESIUM SERPL-MCNC: 1.9 MG/DL — SIGNIFICANT CHANGE UP (ref 1.6–2.6)
MCHC RBC-ENTMCNC: 25.3 PG — LOW (ref 27–34)
MCHC RBC-ENTMCNC: 32.8 % — SIGNIFICANT CHANGE UP (ref 32–36)
MCV RBC AUTO: 77.3 FL — LOW (ref 80–100)
MONOCYTES # BLD AUTO: 0.3 K/UL — SIGNIFICANT CHANGE UP (ref 0–0.9)
MONOCYTES NFR BLD AUTO: 3 % — SIGNIFICANT CHANGE UP (ref 2–14)
NEUTROPHILS # BLD AUTO: 8.73 K/UL — HIGH (ref 1.8–7.4)
NEUTROPHILS NFR BLD AUTO: 88.5 % — HIGH (ref 43–77)
NON-GYNECOLOGICAL CYTOLOGY STUDY: SIGNIFICANT CHANGE UP
NRBC # FLD: 0 — SIGNIFICANT CHANGE UP
PHOSPHATE SERPL-MCNC: 3.6 MG/DL — SIGNIFICANT CHANGE UP (ref 2.5–4.5)
PLATELET # BLD AUTO: 277 K/UL — SIGNIFICANT CHANGE UP (ref 150–400)
PMV BLD: 9.6 FL — SIGNIFICANT CHANGE UP (ref 7–13)
POTASSIUM SERPL-MCNC: 4.6 MMOL/L — SIGNIFICANT CHANGE UP (ref 3.5–5.3)
POTASSIUM SERPL-SCNC: 4.6 MMOL/L — SIGNIFICANT CHANGE UP (ref 3.5–5.3)
RBC # BLD: 3.83 M/UL — SIGNIFICANT CHANGE UP (ref 3.8–5.2)
RBC # FLD: 13.2 % — SIGNIFICANT CHANGE UP (ref 10.3–14.5)
SODIUM SERPL-SCNC: 139 MMOL/L — SIGNIFICANT CHANGE UP (ref 135–145)
WBC # BLD: 9.87 K/UL — SIGNIFICANT CHANGE UP (ref 3.8–10.5)
WBC # FLD AUTO: 9.87 K/UL — SIGNIFICANT CHANGE UP (ref 3.8–10.5)

## 2018-07-20 RX ORDER — KETOROLAC TROMETHAMINE 30 MG/ML
30 SYRINGE (ML) INJECTION ONCE
Qty: 0 | Refills: 0 | Status: DISCONTINUED | OUTPATIENT
Start: 2018-07-20 | End: 2018-07-20

## 2018-07-20 RX ORDER — ONDANSETRON 8 MG/1
4 TABLET, FILM COATED ORAL ONCE
Qty: 0 | Refills: 0 | Status: COMPLETED | OUTPATIENT
Start: 2018-07-20 | End: 2018-07-20

## 2018-07-20 RX ORDER — OXYCODONE HYDROCHLORIDE 5 MG/1
1 TABLET ORAL
Qty: 5 | Refills: 0
Start: 2018-07-20

## 2018-07-20 RX ORDER — OXYCODONE HYDROCHLORIDE 5 MG/1
1 TABLET ORAL
Qty: 0 | Refills: 0 | COMMUNITY
Start: 2018-07-20

## 2018-07-20 RX ORDER — ACETAMINOPHEN 500 MG
2 TABLET ORAL
Qty: 0 | Refills: 0 | DISCHARGE
Start: 2018-07-20

## 2018-07-20 RX ORDER — IBUPROFEN 200 MG
1 TABLET ORAL
Qty: 0 | Refills: 0 | DISCHARGE
Start: 2018-07-20

## 2018-07-20 RX ORDER — ONDANSETRON 8 MG/1
4 TABLET, FILM COATED ORAL EVERY 6 HOURS
Qty: 0 | Refills: 0 | Status: DISCONTINUED | OUTPATIENT
Start: 2018-07-20 | End: 2018-07-20

## 2018-07-20 RX ORDER — OXYCODONE HYDROCHLORIDE 5 MG/1
1 TABLET ORAL
Qty: 5 | Refills: 0 | OUTPATIENT
Start: 2018-07-20

## 2018-07-20 RX ADMIN — OXYCODONE HYDROCHLORIDE 5 MILLIGRAM(S): 5 TABLET ORAL at 13:03

## 2018-07-20 RX ADMIN — ONDANSETRON 4 MILLIGRAM(S): 8 TABLET, FILM COATED ORAL at 04:29

## 2018-07-20 RX ADMIN — OXYCODONE HYDROCHLORIDE 5 MILLIGRAM(S): 5 TABLET ORAL at 09:45

## 2018-07-20 RX ADMIN — OXYCODONE HYDROCHLORIDE 5 MILLIGRAM(S): 5 TABLET ORAL at 18:28

## 2018-07-20 RX ADMIN — OXYCODONE HYDROCHLORIDE 5 MILLIGRAM(S): 5 TABLET ORAL at 00:32

## 2018-07-20 RX ADMIN — ONDANSETRON 4 MILLIGRAM(S): 8 TABLET, FILM COATED ORAL at 11:03

## 2018-07-20 RX ADMIN — SODIUM CHLORIDE 3 MILLILITER(S): 9 INJECTION INTRAMUSCULAR; INTRAVENOUS; SUBCUTANEOUS at 13:02

## 2018-07-20 RX ADMIN — OXYCODONE HYDROCHLORIDE 5 MILLIGRAM(S): 5 TABLET ORAL at 04:32

## 2018-07-20 RX ADMIN — Medication 30 MILLIGRAM(S): at 15:21

## 2018-07-20 RX ADMIN — ONDANSETRON 4 MILLIGRAM(S): 8 TABLET, FILM COATED ORAL at 00:30

## 2018-07-20 RX ADMIN — SODIUM CHLORIDE 3 MILLILITER(S): 9 INJECTION INTRAMUSCULAR; INTRAVENOUS; SUBCUTANEOUS at 05:17

## 2018-07-20 RX ADMIN — OXYCODONE HYDROCHLORIDE 5 MILLIGRAM(S): 5 TABLET ORAL at 04:02

## 2018-07-20 RX ADMIN — OXYCODONE HYDROCHLORIDE 5 MILLIGRAM(S): 5 TABLET ORAL at 00:02

## 2018-07-20 RX ADMIN — OXYCODONE HYDROCHLORIDE 5 MILLIGRAM(S): 5 TABLET ORAL at 08:48

## 2018-07-20 RX ADMIN — OXYCODONE HYDROCHLORIDE 5 MILLIGRAM(S): 5 TABLET ORAL at 19:09

## 2018-07-20 RX ADMIN — OXYCODONE HYDROCHLORIDE 5 MILLIGRAM(S): 5 TABLET ORAL at 14:00

## 2018-07-20 RX ADMIN — Medication 30 MILLIGRAM(S): at 14:30

## 2018-07-20 RX ADMIN — ENOXAPARIN SODIUM 40 MILLIGRAM(S): 100 INJECTION SUBCUTANEOUS at 13:03

## 2018-07-20 NOTE — PROGRESS NOTE ADULT - SUBJECTIVE AND OBJECTIVE BOX
Patient seen and examined at bedside, no acute overnight events. No acute complaints, pain well controlled with oral analgesics. Patient is ambulating and tolerating a regular diet. Has not yet passed flatus. Denies CP, SOB, N/V, fevers, and chills.    Vital Signs Last 24 Hours  T(C): 36.7 (07-20-18 @ 05:34), Max: 36.7 (07-19-18 @ 13:38)  HR: 64 (07-20-18 @ 05:34) (51 - 69)  BP: 112/67 (07-20-18 @ 05:34) (112/67 - 157/65)  RR: 18 (07-20-18 @ 05:34) (12 - 22)  SpO2: 100% (07-20-18 @ 05:34) (94% - 100%)    I&O's Summary    19 Jul 2018 07:01  -  20 Jul 2018 07:00  --------------------------------------------------------  IN: 905 mL / OUT: 760 mL / NET: 145 mL        Physical Exam:  General: NAD  CV: NR, RR, S1, S2, no M/R/G  Lungs: CTA-B  Abdomen: Soft, non-tender, non-distended, +BS  Incision: 3 port incisions, CDI, dressing in place  Ext: No pain or swelling    Labs:             10.9<L>  12.36<H> )-----------( 319      ( 07-19 @ 22:45 )             32.9<L>               10.7<L>  6.22  )-----------( 335      ( 07-18 @ 10:30 )             33.5<L>        MEDICATIONS  (STANDING):  enoxaparin Injectable 40 milliGRAM(s) SubCutaneous daily  lactated ringers. 1000 milliLiter(s) (30 mL/Hr) IV Continuous <Continuous>  lactated ringers. 1000 milliLiter(s) (125 mL/Hr) IV Continuous <Continuous>  sodium chloride 0.9% lock flush 3 milliLiter(s) IV Push every 8 hours    MEDICATIONS  (PRN):  acetaminophen   Tablet. 650 milliGRAM(s) Oral every 6 hours PRN Mild Pain (1 - 3)  ibuprofen  Tablet 400 milliGRAM(s) Oral every 6 hours PRN moderate pain  ondansetron Injectable 4 milliGRAM(s) IV Push every 6 hours PRN Nausea and/or Vomiting  oxyCODONE    IR 5 milliGRAM(s) Oral every 4 hours PRN Severe Pain (7 - 10)

## 2018-07-20 NOTE — PROVIDER CONTACT NOTE (OTHER) - ASSESSMENT
/78. Pt complained of pain and given oxy 5Mg. No complaints of lightheadedness or dizziness. Pt is asymptomatic.

## 2018-07-20 NOTE — PROGRESS NOTE ADULT - ATTENDING COMMENTS
Patient seen and examined at bedside with team at 2pm, agree with above gyn resident note, including assessment and plan. Abdomen benign. Discussed plan of care and instructions in detail with patient and , all questions answered. D/c home today, f/u in office as scheduled. LDS

## 2018-07-20 NOTE — PROGRESS NOTE ADULT - PROBLEM SELECTOR PLAN 1
Neuro: c/w po pain meds  CV: Hemodynamically stable, f/u AM CBC  Pulm: Saturating well on room air, encourage incentive spirometry  GI: c/w regular diet  : voiding spontaneously  Heme: c/w lovenox and SCDs for DVT ppx  Dispo: Continue routine post-op care, anticipate d/c later today    MANAS Childress [email or web address]

## 2018-07-20 NOTE — PROGRESS NOTE ADULT - SUBJECTIVE AND OBJECTIVE BOX
ANESTHESIA POSTOP CHECK    58y Female POSTOP DAY 1 S/P     Vital Signs Last 24 Hrs  T(C): 36.7 (20 Jul 2018 05:34), Max: 36.7 (19 Jul 2018 13:38)  T(F): 98 (20 Jul 2018 05:34), Max: 98.1 (19 Jul 2018 13:38)  HR: 64 (20 Jul 2018 05:34) (51 - 69)  BP: 112/67 (20 Jul 2018 05:34) (112/67 - 157/65)  BP(mean): --  RR: 18 (20 Jul 2018 05:34) (12 - 22)  SpO2: 100% (20 Jul 2018 05:34) (94% - 100%)  I&O's Summary    19 Jul 2018 07:01  -  20 Jul 2018 07:00  --------------------------------------------------------  IN: 1405 mL / OUT: 1110 mL / NET: 295 mL        [x ] NO APPARENT ANESTHESIA COMPLICATIONS      Comments:

## 2018-07-20 NOTE — PROGRESS NOTE ADULT - ASSESSMENT
59y/o POD#1 from -TL, BSO, PPALND, vaginal laceration repair for endometrial adenocarcinoma in stable condition. Patient meeting postoperative milestones.

## 2018-07-23 PROBLEM — Z80.0 FAMILY HISTORY OF COLON CANCER: Status: ACTIVE | Noted: 2017-08-23

## 2018-07-26 ENCOUNTER — INBOUND DOCUMENT (OUTPATIENT)
Age: 58
End: 2018-07-26

## 2018-07-31 ENCOUNTER — INBOUND DOCUMENT (OUTPATIENT)
Age: 58
End: 2018-07-31

## 2018-07-31 LAB — SURGICAL PATHOLOGY STUDY: SIGNIFICANT CHANGE UP

## 2018-08-02 ENCOUNTER — LABORATORY RESULT (OUTPATIENT)
Age: 58
End: 2018-08-02

## 2018-08-02 ENCOUNTER — APPOINTMENT (OUTPATIENT)
Dept: GYNECOLOGIC ONCOLOGY | Facility: CLINIC | Age: 58
End: 2018-08-02
Payer: COMMERCIAL

## 2018-08-02 PROBLEM — C54.1 MALIGNANT NEOPLASM OF ENDOMETRIUM: Chronic | Status: ACTIVE | Noted: 2018-07-18

## 2018-08-02 PROBLEM — N95.0 POSTMENOPAUSAL BLEEDING: Chronic | Status: ACTIVE | Noted: 2018-07-18

## 2018-08-02 PROBLEM — D25.9 LEIOMYOMA OF UTERUS, UNSPECIFIED: Chronic | Status: ACTIVE | Noted: 2018-07-18

## 2018-08-02 PROCEDURE — 99024 POSTOP FOLLOW-UP VISIT: CPT

## 2018-08-03 LAB
APPEARANCE: CLEAR
BILIRUBIN URINE: NEGATIVE
BLOOD URINE: ABNORMAL
COLOR: YELLOW
GLUCOSE QUALITATIVE U: NEGATIVE MG/DL
KETONES URINE: NEGATIVE
LEUKOCYTE ESTERASE URINE: ABNORMAL
NITRITE URINE: NEGATIVE
PH URINE: 7
PROTEIN URINE: NEGATIVE MG/DL
SPECIFIC GRAVITY URINE: 1.01
UROBILINOGEN URINE: NEGATIVE MG/DL

## 2018-08-06 ENCOUNTER — OUTPATIENT (OUTPATIENT)
Dept: OUTPATIENT SERVICES | Facility: HOSPITAL | Age: 58
LOS: 1 days | Discharge: HOME | End: 2018-08-06
Payer: COMMERCIAL

## 2018-08-06 DIAGNOSIS — Z98.891 HISTORY OF UTERINE SCAR FROM PREVIOUS SURGERY: Chronic | ICD-10-CM

## 2018-08-06 DIAGNOSIS — Z98.890 OTHER SPECIFIED POSTPROCEDURAL STATES: Chronic | ICD-10-CM

## 2018-08-06 DIAGNOSIS — Z90.49 ACQUIRED ABSENCE OF OTHER SPECIFIED PARTS OF DIGESTIVE TRACT: Chronic | ICD-10-CM

## 2018-08-09 ENCOUNTER — APPOINTMENT (OUTPATIENT)
Dept: RADIATION ONCOLOGY | Facility: CLINIC | Age: 58
End: 2018-08-09
Payer: COMMERCIAL

## 2018-08-09 VITALS
BODY MASS INDEX: 25.69 KG/M2 | SYSTOLIC BLOOD PRESSURE: 161 MMHG | TEMPERATURE: 98.4 F | HEIGHT: 63 IN | DIASTOLIC BLOOD PRESSURE: 88 MMHG | HEART RATE: 73 BPM | WEIGHT: 145 LBS

## 2018-08-09 DIAGNOSIS — Z87.42 PERSONAL HISTORY OF OTHER DISEASES OF THE FEMALE GENITAL TRACT: ICD-10-CM

## 2018-08-09 PROCEDURE — 99205 OFFICE O/P NEW HI 60 MIN: CPT | Mod: 25

## 2018-08-10 PROBLEM — Z87.42 HISTORY OF POSTMENOPAUSAL BLEEDING: Status: RESOLVED | Noted: 2018-06-12 | Resolved: 2018-08-10

## 2018-08-16 ENCOUNTER — INBOUND DOCUMENT (OUTPATIENT)
Age: 58
End: 2018-08-16

## 2018-09-04 PROCEDURE — 77263 THER RADIOLOGY TX PLNG CPLX: CPT

## 2018-09-07 PROCEDURE — 57156 INS VAG BRACHYTX DEVICE: CPT

## 2018-09-07 PROCEDURE — 77770 HDR RDNCL NTRSTL/ICAV BRCHTX: CPT | Mod: 26

## 2018-09-07 PROCEDURE — 77317 BRACHYTX ISODOSE INTERMED: CPT | Mod: 26

## 2018-09-07 PROCEDURE — 77332 RADIATION TREATMENT AID(S): CPT | Mod: 26

## 2018-09-07 PROCEDURE — 77290 THER RAD SIMULAJ FIELD CPLX: CPT | Mod: 26

## 2018-09-07 PROCEDURE — 77470 SPECIAL RADIATION TREATMENT: CPT | Mod: 26

## 2018-09-12 PROCEDURE — 77332 RADIATION TREATMENT AID(S): CPT | Mod: 26

## 2018-09-12 PROCEDURE — 77770 HDR RDNCL NTRSTL/ICAV BRCHTX: CPT | Mod: 26

## 2018-09-12 PROCEDURE — 57156 INS VAG BRACHYTX DEVICE: CPT

## 2018-09-13 PROCEDURE — 77770 HDR RDNCL NTRSTL/ICAV BRCHTX: CPT | Mod: 26

## 2018-09-13 PROCEDURE — 77332 RADIATION TREATMENT AID(S): CPT | Mod: 26

## 2018-09-13 PROCEDURE — 57156 INS VAG BRACHYTX DEVICE: CPT

## 2018-09-14 PROCEDURE — 57156 INS VAG BRACHYTX DEVICE: CPT

## 2018-09-14 PROCEDURE — 77770 HDR RDNCL NTRSTL/ICAV BRCHTX: CPT | Mod: 26

## 2018-09-14 PROCEDURE — 77332 RADIATION TREATMENT AID(S): CPT | Mod: 26

## 2018-09-17 PROCEDURE — 57156 INS VAG BRACHYTX DEVICE: CPT

## 2018-09-17 PROCEDURE — 77332 RADIATION TREATMENT AID(S): CPT | Mod: 26

## 2018-09-17 PROCEDURE — 77770 HDR RDNCL NTRSTL/ICAV BRCHTX: CPT | Mod: 26

## 2018-10-19 ENCOUNTER — APPOINTMENT (OUTPATIENT)
Dept: RADIATION ONCOLOGY | Facility: CLINIC | Age: 58
End: 2018-10-19
Payer: COMMERCIAL

## 2018-10-19 VITALS
TEMPERATURE: 99.68 F | DIASTOLIC BLOOD PRESSURE: 83 MMHG | HEART RATE: 65 BPM | WEIGHT: 146.49 LBS | RESPIRATION RATE: 14 BRPM | SYSTOLIC BLOOD PRESSURE: 166 MMHG | BODY MASS INDEX: 25.95 KG/M2 | OXYGEN SATURATION: 100 %

## 2018-10-19 PROCEDURE — 99024 POSTOP FOLLOW-UP VISIT: CPT

## 2018-10-19 RX ORDER — CEFUROXIME AXETIL 250 MG/1
250 TABLET ORAL
Qty: 14 | Refills: 0 | Status: DISCONTINUED | COMMUNITY
Start: 2018-05-16

## 2018-10-19 NOTE — REASON FOR VISIT
[Post-Treatment Evaluation] : post-treatment evaluation for [Endometrial Cancer] : endometrial cancer

## 2018-10-19 NOTE — VITALS
[Maximal Pain Intensity: 0/10] : 0/10 [Least Pain Intensity: 0/10] : 0/10 [80: Normal activity with effort; some signs or symptoms of disease.] : 80: Normal activity with effort; some signs or symptoms of disease.  [ECOG Performance Status: 0 - Fully active, able to carry on all pre-disease performance without restriction] : Performance Status: 0 - Fully active, able to carry on all pre-disease performance without restriction

## 2018-10-19 NOTE — REVIEW OF SYSTEMS
[Fatigue] : fatigue [Negative] : Integumentary [Constipation: Grade 0] : Constipation: Grade 0 [Diarrhea: Grade 0] : Diarrhea: Grade 0 [Cough: Grade 0] : Cough: Grade 0 [Dyspnea: Grade 0] : Dyspnea: Grade 0

## 2018-10-22 NOTE — PHYSICAL EXAM
[Abdomen Soft] : soft [Nondistended] : nondistended [Abdomen Tenderness] : non-tender [Normal] : normal sphincter tone, no rectal mass and no blood on examination glove [Normal External Genitalia] : normal external genitalia  [Normal Vaginal Cuff] : vaginal cuff without lesion or nodularity [Cervical Lymph Nodes Enlarged Anterior Bilaterally] : anterior cervical [Supraclavicular Lymph Nodes Enlarged Bilaterally] : supraclavicular [Inguinal Lymph Nodes Enlarged Bilaterally] : inguinal [de-identified] : left thigh bruise sustained in a fall

## 2018-10-22 NOTE — DISEASE MANAGEMENT
[Pathological] : TNM Stage: p [IB] : IB [FreeTextEntry4] : uterine [TTNM] : 1b [NTNM] : o [MTNM] : o

## 2018-10-22 NOTE — HISTORY OF PRESENT ILLNESS
[FreeTextEntry1] : Mrs. Manriquez is here today for pte s/p vaginal brachytherapy for FIGO1b endometrial cancer.\par She attends to day feeling well.  She is using a vaginal dilatoe as well as hyaluronic acid.\par she rports no gi/gu issues.  s\par Shedenies vag bleeding or discharge.

## 2018-11-16 ENCOUNTER — APPOINTMENT (OUTPATIENT)
Dept: GYNECOLOGIC ONCOLOGY | Facility: CLINIC | Age: 58
End: 2018-11-16
Payer: COMMERCIAL

## 2018-11-16 VITALS
WEIGHT: 148 LBS | DIASTOLIC BLOOD PRESSURE: 72 MMHG | HEIGHT: 63 IN | SYSTOLIC BLOOD PRESSURE: 144 MMHG | BODY MASS INDEX: 26.22 KG/M2

## 2018-11-16 PROCEDURE — 99213 OFFICE O/P EST LOW 20 MIN: CPT

## 2019-03-14 ENCOUNTER — TRANSCRIPTION ENCOUNTER (OUTPATIENT)
Age: 59
End: 2019-03-14

## 2019-04-18 ENCOUNTER — APPOINTMENT (OUTPATIENT)
Dept: RADIATION ONCOLOGY | Facility: CLINIC | Age: 59
End: 2019-04-18
Payer: COMMERCIAL

## 2019-04-18 VITALS
HEART RATE: 69 BPM | WEIGHT: 154.76 LBS | DIASTOLIC BLOOD PRESSURE: 83 MMHG | SYSTOLIC BLOOD PRESSURE: 186 MMHG | RESPIRATION RATE: 19 BRPM | OXYGEN SATURATION: 100 % | BODY MASS INDEX: 27.41 KG/M2

## 2019-04-18 PROCEDURE — 99213 OFFICE O/P EST LOW 20 MIN: CPT

## 2019-04-18 NOTE — VITALS
[90: Able to carry normal activity; minor signs or symptoms of disease.] : 90: Able to carry normal activity; minor signs or symptoms of disease.

## 2019-04-19 ENCOUNTER — APPOINTMENT (OUTPATIENT)
Dept: GYNECOLOGIC ONCOLOGY | Facility: CLINIC | Age: 59
End: 2019-04-19
Payer: COMMERCIAL

## 2019-04-19 VITALS
DIASTOLIC BLOOD PRESSURE: 85 MMHG | HEIGHT: 63 IN | BODY MASS INDEX: 26.58 KG/M2 | WEIGHT: 150 LBS | SYSTOLIC BLOOD PRESSURE: 161 MMHG

## 2019-04-19 PROCEDURE — 99213 OFFICE O/P EST LOW 20 MIN: CPT

## 2019-04-19 NOTE — HISTORY OF PRESENT ILLNESS
[FreeTextEntry1] : STAGE IB grade 2 + LVSI endometrial cancer. \par RTLH, BSO, SLBM, P&PA LND on 7/19/18.\par \par Received vaginal brachytherapy 2500 cGy in 5 fractions over 10 days completed 9/17/18. \par \par She returns for a surveillance visit today. \par She is using a vaginal dilator. \par She reports no vaginal bleeding or pelvic/abdominal pain. \par \par \par Mammogram 6/2018 BIRADS 2.

## 2019-04-19 NOTE — PHYSICAL EXAM
[Absent] : Adnexa(ae): Absent [Normal] : Recto-Vaginal Exam: Normal [de-identified] : well healed LSC scars

## 2019-05-13 NOTE — HISTORY OF PRESENT ILLNESS
[FreeTextEntry1] : Ms. Manriquez is a 58 year old postmenopausal woman FIGO IB nuclear grade 2 invasive endometrioid adenocarcinoma. she is s/p comprehensive surgical staging.. She underwent treatment with brachy-intracavitary to the vaginal cuff between 9/7/18 - 9/17/18 to a dose of 2500 cGy. Patient tolerated radiation treatment well.\par \par She present here today for follow up. She denies pain, fatigue. She works out on the [employer]. Denies vaginal bleeding, She denies Being sexually active but reports using vaginal dilators Vuvatech and Halo with hyaluronic acid. and reports it seems to work.\par

## 2019-05-13 NOTE — PHYSICAL EXAM
[Normal] : normal heart rate and rhythm, normal S1 and S2, and no murmurs present [de-identified] : left thigh bruise sustained in a fall [] : no respiratory distress

## 2019-06-23 ENCOUNTER — FORM ENCOUNTER (OUTPATIENT)
Age: 59
End: 2019-06-23

## 2019-06-24 ENCOUNTER — APPOINTMENT (OUTPATIENT)
Dept: MAMMOGRAPHY | Facility: IMAGING CENTER | Age: 59
End: 2019-06-24
Payer: COMMERCIAL

## 2019-06-24 ENCOUNTER — OUTPATIENT (OUTPATIENT)
Dept: OUTPATIENT SERVICES | Facility: HOSPITAL | Age: 59
LOS: 1 days | End: 2019-06-24
Payer: COMMERCIAL

## 2019-06-24 DIAGNOSIS — Z90.49 ACQUIRED ABSENCE OF OTHER SPECIFIED PARTS OF DIGESTIVE TRACT: Chronic | ICD-10-CM

## 2019-06-24 DIAGNOSIS — Z98.890 OTHER SPECIFIED POSTPROCEDURAL STATES: Chronic | ICD-10-CM

## 2019-06-24 DIAGNOSIS — Z98.891 HISTORY OF UTERINE SCAR FROM PREVIOUS SURGERY: Chronic | ICD-10-CM

## 2019-06-24 DIAGNOSIS — C54.1 MALIGNANT NEOPLASM OF ENDOMETRIUM: ICD-10-CM

## 2019-06-24 PROCEDURE — 77063 BREAST TOMOSYNTHESIS BI: CPT

## 2019-06-24 PROCEDURE — 77067 SCR MAMMO BI INCL CAD: CPT

## 2019-06-24 PROCEDURE — 77063 BREAST TOMOSYNTHESIS BI: CPT | Mod: 26

## 2019-06-24 PROCEDURE — 77067 SCR MAMMO BI INCL CAD: CPT | Mod: 26

## 2019-08-13 ENCOUNTER — APPOINTMENT (OUTPATIENT)
Dept: GYNECOLOGIC ONCOLOGY | Facility: CLINIC | Age: 59
End: 2019-08-13
Payer: COMMERCIAL

## 2019-08-13 VITALS
HEIGHT: 63 IN | DIASTOLIC BLOOD PRESSURE: 70 MMHG | BODY MASS INDEX: 26.58 KG/M2 | SYSTOLIC BLOOD PRESSURE: 142 MMHG | WEIGHT: 150 LBS

## 2019-08-13 DIAGNOSIS — Z85.42 PERSONAL HISTORY OF MALIGNANT NEOPLASM OF OTHER PARTS OF UTERUS: ICD-10-CM

## 2019-08-13 DIAGNOSIS — R22.2 LOCALIZED SWELLING, MASS AND LUMP, TRUNK: ICD-10-CM

## 2019-08-13 DIAGNOSIS — Z09 ENCOUNTER FOR FOLLOW-UP EXAMINATION AFTER COMPLETED TREATMENT FOR CONDITIONS OTHER THAN MALIGNANT NEOPLASM: ICD-10-CM

## 2019-08-13 PROCEDURE — 99213 OFFICE O/P EST LOW 20 MIN: CPT

## 2019-08-13 RX ORDER — PECTIN/VIT B6/MINS 4/C.VINEGAR 8.3-300MG
TABLET ORAL
Refills: 0 | Status: DISCONTINUED | COMMUNITY
End: 2019-08-13

## 2019-08-13 NOTE — PHYSICAL EXAM
[Absent] : Adnexa(ae): Absent [Normal] : Breasts: Normal [de-identified] : well healed LSC scars [Fully active, able to carry on all pre-disease performance without restriction] : Status 0 - Fully active, able to carry on all pre-disease performance without restriction

## 2019-08-13 NOTE — HISTORY OF PRESENT ILLNESS
[FreeTextEntry1] : STAGE IB grade 2 + LVSI endometrial cancer. \par RTLH, BSO, SLBM, P&PA LND on 7/19/18.\par \par Received vaginal brachytherapy 2500 cGy in 5 fractions over 10 days completed 9/17/18. \par \par She returns for a surveillance visit today. \par She is using a vaginal dilator. \par She reports no vaginal bleeding or pelvic/abdominal pain. \par \par Mammogram 6/2018 BIRADS 2.

## 2019-11-14 ENCOUNTER — APPOINTMENT (OUTPATIENT)
Dept: RADIATION ONCOLOGY | Facility: CLINIC | Age: 59
End: 2019-11-14
Payer: COMMERCIAL

## 2019-11-14 VITALS
RESPIRATION RATE: 18 BRPM | BODY MASS INDEX: 26.56 KG/M2 | WEIGHT: 149.91 LBS | HEART RATE: 69 BPM | SYSTOLIC BLOOD PRESSURE: 167 MMHG | TEMPERATURE: 210.38 F | DIASTOLIC BLOOD PRESSURE: 83 MMHG | HEIGHT: 63 IN | OXYGEN SATURATION: 99 %

## 2019-11-14 PROCEDURE — 99212 OFFICE O/P EST SF 10 MIN: CPT

## 2019-11-14 NOTE — VITALS
[Maximal Pain Intensity: 0/10] : 0/10 [Least Pain Intensity: 0/10] : 0/10 [90: Able to carry normal activity; minor signs or symptoms of disease.] : 90: Able to carry normal activity; minor signs or symptoms of disease.  [ECOG Performance Status: 0 - Fully active, able to carry on all pre-disease performance without restriction] : Performance Status: 0 - Fully active, able to carry on all pre-disease performance without restriction [90: Minor restrictions in physically strenous activity.] : 90: Minor restrictions in physically strenuous activity.

## 2019-11-18 NOTE — HISTORY OF PRESENT ILLNESS
[FreeTextEntry1] : Ms. Manriquez is a 59 year old postmenopausal woman FIGO IB nuclear grade 2 invasive endometrioid adenocarcinoma. She is s/p comprehensive surgical staging.followed by adjuvant High dose rate brachytherapy brachy-intracavitary to the vaginal cuff between 9/7/18 - 9/17/18 to a dose of 2500 cGy. Patient tolerated radiation treatment well.\par \par She present here today for follow up. She denies fatigue. No pain. No urinary or bowel changes. No vaginal bleeding or discharge. Not sexually active. Using vaginal dilator once weekly. Continues to follow up with Dr. Fuentes

## 2019-11-18 NOTE — DISEASE MANAGEMENT
[Pathological] : TNM Stage: p [IB] : IB [FreeTextEntry4] : uterine [NTNM] : o [TTNM] : 1b [MTNM] : o

## 2019-11-18 NOTE — REVIEW OF SYSTEMS
[Diarrhea: Grade 0] : Diarrhea: Grade 0 [Constipation: Grade 0] : Constipation: Grade 0 [Cough: Grade 0] : Cough: Grade 0 [Dyspnea: Grade 0] : Dyspnea: Grade 0 [Fatigue] : fatigue [Negative] : Integumentary [Localized Edema: Grade 0] : Localized Edema: Grade 0  [Hematuria: Grade 0] : Hematuria: Grade 0 [Urinary Incontinence: Grade 0] : Urinary Incontinence: Grade 0  [Urinary Tract Pain: Grade 0] : Urinary Tract Pain: Grade 0 [Vaginal Stricture: Grade 0] : Vaginal Stricture: Grade 0 [Genital Edema: Grade 0] : Genital Edema: Grade 0 [FreeTextEntry8] : Not sexually actve

## 2019-11-18 NOTE — PHYSICAL EXAM
[] : no respiratory distress [Abdomen Soft] : soft [Abdomen Tenderness] : non-tender [Nondistended] : nondistended [Normal Vaginal Cuff] : vaginal cuff without lesion or nodularity [Normal] : normal sphincter tone, no rectal mass and no blood on examination glove [Normal External Genitalia] : normal external genitalia  [Inguinal Lymph Nodes Enlarged Bilaterally] : inguinal [Supraclavicular Lymph Nodes Enlarged Bilaterally] : supraclavicular [Cervical Lymph Nodes Enlarged Anterior Bilaterally] : anterior cervical [de-identified] : left thigh bruise sustained in a fall

## 2020-02-11 ENCOUNTER — APPOINTMENT (OUTPATIENT)
Dept: GYNECOLOGIC ONCOLOGY | Facility: CLINIC | Age: 60
End: 2020-02-11
Payer: COMMERCIAL

## 2020-02-11 VITALS
DIASTOLIC BLOOD PRESSURE: 70 MMHG | WEIGHT: 145 LBS | SYSTOLIC BLOOD PRESSURE: 125 MMHG | BODY MASS INDEX: 25.69 KG/M2 | HEIGHT: 63 IN

## 2020-02-11 PROCEDURE — 99213 OFFICE O/P EST LOW 20 MIN: CPT

## 2020-02-11 NOTE — PHYSICAL EXAM
[Absent] : Adnexa(ae): Absent [Normal] : Recto-Vaginal Exam: Normal [Fully active, able to carry on all pre-disease performance without restriction] : Status 0 - Fully active, able to carry on all pre-disease performance without restriction [de-identified] : well healed LSC scars, palpable 3 cm firm mass just under the left sided robotic trochar incision

## 2020-02-11 NOTE — HISTORY OF PRESENT ILLNESS
[FreeTextEntry1] : STAGE IB grade 2 + LVSI endometrial cancer. \par RTLH, BSO, SLBM, P&PA LND on 7/19/18.\par \par Received vaginal brachytherapy 2500 cGy in 5 fractions over 10 days completed 9/17/18. \par \par She returns for a surveillance visit today. \par She is using a vaginal dilator. \par She reports no vaginal bleeding. \par She reports new left upper abdominal pain and firmness. \par \par Mammogram 6/2018 BIRADS 2.

## 2020-02-14 ENCOUNTER — FORM ENCOUNTER (OUTPATIENT)
Age: 60
End: 2020-02-14

## 2020-02-15 ENCOUNTER — OUTPATIENT (OUTPATIENT)
Dept: OUTPATIENT SERVICES | Facility: HOSPITAL | Age: 60
LOS: 1 days | End: 2020-02-15
Payer: COMMERCIAL

## 2020-02-15 ENCOUNTER — APPOINTMENT (OUTPATIENT)
Dept: CT IMAGING | Facility: IMAGING CENTER | Age: 60
End: 2020-02-15
Payer: COMMERCIAL

## 2020-02-15 DIAGNOSIS — C54.1 MALIGNANT NEOPLASM OF ENDOMETRIUM: ICD-10-CM

## 2020-02-15 DIAGNOSIS — Z98.890 OTHER SPECIFIED POSTPROCEDURAL STATES: Chronic | ICD-10-CM

## 2020-02-15 DIAGNOSIS — Z90.49 ACQUIRED ABSENCE OF OTHER SPECIFIED PARTS OF DIGESTIVE TRACT: Chronic | ICD-10-CM

## 2020-02-15 DIAGNOSIS — Z98.891 HISTORY OF UTERINE SCAR FROM PREVIOUS SURGERY: Chronic | ICD-10-CM

## 2020-02-15 PROCEDURE — 71260 CT THORAX DX C+: CPT | Mod: 26

## 2020-02-15 PROCEDURE — 74177 CT ABD & PELVIS W/CONTRAST: CPT | Mod: 26

## 2020-02-15 PROCEDURE — 71260 CT THORAX DX C+: CPT

## 2020-02-15 PROCEDURE — 74177 CT ABD & PELVIS W/CONTRAST: CPT

## 2020-03-06 ENCOUNTER — RESULT REVIEW (OUTPATIENT)
Age: 60
End: 2020-03-06

## 2020-03-06 ENCOUNTER — OUTPATIENT (OUTPATIENT)
Dept: OUTPATIENT SERVICES | Facility: HOSPITAL | Age: 60
LOS: 1 days | End: 2020-03-06
Payer: COMMERCIAL

## 2020-03-06 ENCOUNTER — APPOINTMENT (OUTPATIENT)
Dept: ULTRASOUND IMAGING | Facility: IMAGING CENTER | Age: 60
End: 2020-03-06
Payer: COMMERCIAL

## 2020-03-06 DIAGNOSIS — Z98.891 HISTORY OF UTERINE SCAR FROM PREVIOUS SURGERY: Chronic | ICD-10-CM

## 2020-03-06 DIAGNOSIS — R22.2 LOCALIZED SWELLING, MASS AND LUMP, TRUNK: ICD-10-CM

## 2020-03-06 DIAGNOSIS — C54.1 MALIGNANT NEOPLASM OF ENDOMETRIUM: ICD-10-CM

## 2020-03-06 DIAGNOSIS — Z98.890 OTHER SPECIFIED POSTPROCEDURAL STATES: Chronic | ICD-10-CM

## 2020-03-06 DIAGNOSIS — Z90.49 ACQUIRED ABSENCE OF OTHER SPECIFIED PARTS OF DIGESTIVE TRACT: Chronic | ICD-10-CM

## 2020-03-06 PROCEDURE — 88342 IMHCHEM/IMCYTCHM 1ST ANTB: CPT

## 2020-03-06 PROCEDURE — 76942 ECHO GUIDE FOR BIOPSY: CPT

## 2020-03-06 PROCEDURE — 88305 TISSUE EXAM BY PATHOLOGIST: CPT | Mod: 26

## 2020-03-06 PROCEDURE — 88173 CYTOPATH EVAL FNA REPORT: CPT

## 2020-03-06 PROCEDURE — 88341 IMHCHEM/IMCYTCHM EA ADD ANTB: CPT

## 2020-03-06 PROCEDURE — 20206 BIOPSY MUSCLE PERQ NEEDLE: CPT

## 2020-03-06 PROCEDURE — 88360 TUMOR IMMUNOHISTOCHEM/MANUAL: CPT

## 2020-03-06 PROCEDURE — 88305 TISSUE EXAM BY PATHOLOGIST: CPT

## 2020-03-06 PROCEDURE — 88341 IMHCHEM/IMCYTCHM EA ADD ANTB: CPT | Mod: 26,59

## 2020-03-06 PROCEDURE — 88342 IMHCHEM/IMCYTCHM 1ST ANTB: CPT | Mod: 26,59

## 2020-03-06 PROCEDURE — 88172 CYTP DX EVAL FNA 1ST EA SITE: CPT

## 2020-03-06 PROCEDURE — 88173 CYTOPATH EVAL FNA REPORT: CPT | Mod: 26

## 2020-03-06 PROCEDURE — 88360 TUMOR IMMUNOHISTOCHEM/MANUAL: CPT | Mod: 26

## 2020-03-06 PROCEDURE — 76942 ECHO GUIDE FOR BIOPSY: CPT | Mod: 26

## 2020-03-19 ENCOUNTER — OUTPATIENT (OUTPATIENT)
Dept: OUTPATIENT SERVICES | Facility: HOSPITAL | Age: 60
LOS: 1 days | Discharge: HOME | End: 2020-03-19
Payer: COMMERCIAL

## 2020-03-19 DIAGNOSIS — Z98.890 OTHER SPECIFIED POSTPROCEDURAL STATES: Chronic | ICD-10-CM

## 2020-03-19 DIAGNOSIS — Z98.891 HISTORY OF UTERINE SCAR FROM PREVIOUS SURGERY: Chronic | ICD-10-CM

## 2020-03-19 DIAGNOSIS — Z90.49 ACQUIRED ABSENCE OF OTHER SPECIFIED PARTS OF DIGESTIVE TRACT: Chronic | ICD-10-CM

## 2020-03-19 DIAGNOSIS — C54.1 MALIGNANT NEOPLASM OF ENDOMETRIUM: ICD-10-CM

## 2020-03-23 ENCOUNTER — APPOINTMENT (OUTPATIENT)
Dept: HEMATOLOGY ONCOLOGY | Facility: CLINIC | Age: 60
End: 2020-03-23
Payer: COMMERCIAL

## 2020-03-23 PROCEDURE — 99203 OFFICE O/P NEW LOW 30 MIN: CPT

## 2020-03-23 RX ORDER — APREPITANT 80 MG/1
80 CAPSULE ORAL DAILY
Qty: 2 | Refills: 5 | Status: ACTIVE | COMMUNITY
Start: 2020-03-23 | End: 1900-01-01

## 2020-03-24 ENCOUNTER — RESULT REVIEW (OUTPATIENT)
Age: 60
End: 2020-03-24

## 2020-03-24 ENCOUNTER — APPOINTMENT (OUTPATIENT)
Dept: HEMATOLOGY ONCOLOGY | Facility: CLINIC | Age: 60
End: 2020-03-24
Payer: COMMERCIAL

## 2020-03-24 VITALS
SYSTOLIC BLOOD PRESSURE: 156 MMHG | TEMPERATURE: 98.8 F | OXYGEN SATURATION: 99 % | HEART RATE: 78 BPM | RESPIRATION RATE: 16 BRPM | HEIGHT: 62.4 IN | WEIGHT: 143.3 LBS | BODY MASS INDEX: 26.04 KG/M2 | DIASTOLIC BLOOD PRESSURE: 82 MMHG

## 2020-03-24 DIAGNOSIS — Z11.59 ENCOUNTER FOR SCREENING FOR OTHER VIRAL DISEASES: ICD-10-CM

## 2020-03-24 LAB
BASOPHILS # BLD AUTO: 0.04 K/UL — SIGNIFICANT CHANGE UP (ref 0–0.2)
BASOPHILS NFR BLD AUTO: 0.5 % — SIGNIFICANT CHANGE UP (ref 0–2)
EOSINOPHIL # BLD AUTO: 0.08 K/UL — SIGNIFICANT CHANGE UP (ref 0–0.5)
EOSINOPHIL NFR BLD AUTO: 1.1 % — SIGNIFICANT CHANGE UP (ref 0–6)
HCT VFR BLD CALC: 35.8 % — SIGNIFICANT CHANGE UP (ref 34.5–45)
HGB BLD-MCNC: 12 G/DL — SIGNIFICANT CHANGE UP (ref 11.5–15.5)
IMM GRANULOCYTES NFR BLD AUTO: 0.4 % — SIGNIFICANT CHANGE UP (ref 0–1.5)
LYMPHOCYTES # BLD AUTO: 1.94 K/UL — SIGNIFICANT CHANGE UP (ref 1–3.3)
LYMPHOCYTES # BLD AUTO: 26.1 % — SIGNIFICANT CHANGE UP (ref 13–44)
MCHC RBC-ENTMCNC: 26 PG — LOW (ref 27–34)
MCHC RBC-ENTMCNC: 33.5 GM/DL — SIGNIFICANT CHANGE UP (ref 32–36)
MCV RBC AUTO: 77.7 FL — LOW (ref 80–100)
MONOCYTES # BLD AUTO: 0.42 K/UL — SIGNIFICANT CHANGE UP (ref 0–0.9)
MONOCYTES NFR BLD AUTO: 5.7 % — SIGNIFICANT CHANGE UP (ref 2–14)
NEUTROPHILS # BLD AUTO: 4.91 K/UL — SIGNIFICANT CHANGE UP (ref 1.8–7.4)
NEUTROPHILS NFR BLD AUTO: 66.2 % — SIGNIFICANT CHANGE UP (ref 43–77)
NRBC # BLD: 0 /100 WBCS — SIGNIFICANT CHANGE UP (ref 0–0)
PLATELET # BLD AUTO: 297 K/UL — SIGNIFICANT CHANGE UP (ref 150–400)
RBC # BLD: 4.61 M/UL — SIGNIFICANT CHANGE UP (ref 3.8–5.2)
RBC # FLD: 13.2 % — SIGNIFICANT CHANGE UP (ref 10.3–14.5)
WBC # BLD: 7.42 K/UL — SIGNIFICANT CHANGE UP (ref 3.8–10.5)
WBC # FLD AUTO: 7.42 K/UL — SIGNIFICANT CHANGE UP (ref 3.8–10.5)

## 2020-03-24 PROCEDURE — 93010 ELECTROCARDIOGRAM REPORT: CPT

## 2020-03-24 PROCEDURE — 99213 OFFICE O/P EST LOW 20 MIN: CPT

## 2020-03-27 NOTE — OB HISTORY
[Total Preg ___] : : [unfilled] [Abortions ___] : [unfilled] (abortions) [Living ___] : [unfilled] (living) [ ___] : [unfilled]  section delivery(s) [AB Induced ___] : [unfilled] elective (s) [Menarche Age ____] : age at menarche was [unfilled] [Menopause  Age ____] : menopause occurred at age [unfilled]

## 2020-03-27 NOTE — PAST MEDICAL HISTORY
[Postmenopausal] : The patient is postmenopausal [Menarche Age ____] : age at menarche was [unfilled] [Menopause Age____] : age at menopause was [unfilled] [Definite ___ (Date)] : the last menstrual period was [unfilled] [Total Preg ___] : G[unfilled] [Live Births ___] : P[unfilled]  [Full Term ___] : Full Term: [unfilled] [Abortions ___] : Abortions:[unfilled] [Living ___] : Living: [unfilled]

## 2020-03-30 ENCOUNTER — RESULT REVIEW (OUTPATIENT)
Age: 60
End: 2020-03-30

## 2020-03-30 ENCOUNTER — APPOINTMENT (OUTPATIENT)
Dept: INFUSION THERAPY | Facility: HOSPITAL | Age: 60
End: 2020-03-30

## 2020-03-30 LAB
ALBUMIN SERPL ELPH-MCNC: 4.9 G/DL
ALP BLD-CCNC: 97 U/L
ALT SERPL-CCNC: 14 U/L
ANION GAP SERPL CALC-SCNC: 21 MMOL/L
APTT BLD: 33.8 SEC
AST SERPL-CCNC: 19 U/L
BASOPHILS # BLD AUTO: 0.01 K/UL — SIGNIFICANT CHANGE UP (ref 0–0.2)
BASOPHILS NFR BLD AUTO: 0.1 % — SIGNIFICANT CHANGE UP (ref 0–2)
BILIRUB SERPL-MCNC: 0.4 MG/DL
BUN SERPL-MCNC: 17 MG/DL
CALCIUM SERPL-MCNC: 9.7 MG/DL
CHLORIDE SERPL-SCNC: 105 MMOL/L
CO2 SERPL-SCNC: 19 MMOL/L
CREAT SERPL-MCNC: 0.86 MG/DL
EOSINOPHIL # BLD AUTO: 0 K/UL — SIGNIFICANT CHANGE UP (ref 0–0.5)
EOSINOPHIL NFR BLD AUTO: 0 % — SIGNIFICANT CHANGE UP (ref 0–6)
GLUCOSE SERPL-MCNC: 103 MG/DL
HAV IGM SER QL: NONREACTIVE
HBV CORE IGM SER QL: NONREACTIVE
HBV SURFACE AG SER QL: NONREACTIVE
HCT VFR BLD CALC: 37.8 % — SIGNIFICANT CHANGE UP (ref 34.5–45)
HCV AB SER QL: NONREACTIVE
HCV S/CO RATIO: 0.16 S/CO
HGB BLD-MCNC: 12.7 G/DL — SIGNIFICANT CHANGE UP (ref 11.5–15.5)
IMM GRANULOCYTES NFR BLD AUTO: 0.8 % — SIGNIFICANT CHANGE UP (ref 0–1.5)
INR PPP: 1.01 RATIO
LYMPHOCYTES # BLD AUTO: 0.81 K/UL — LOW (ref 1–3.3)
LYMPHOCYTES # BLD AUTO: 8.8 % — LOW (ref 13–44)
MAGNESIUM SERPL-MCNC: 2.4 MG/DL
MCHC RBC-ENTMCNC: 25.9 PG — LOW (ref 27–34)
MCHC RBC-ENTMCNC: 33.6 GM/DL — SIGNIFICANT CHANGE UP (ref 32–36)
MCV RBC AUTO: 77 FL — LOW (ref 80–100)
MONOCYTES # BLD AUTO: 0.03 K/UL — SIGNIFICANT CHANGE UP (ref 0–0.9)
MONOCYTES NFR BLD AUTO: 0.3 % — LOW (ref 2–14)
NEUTROPHILS # BLD AUTO: 8.25 K/UL — HIGH (ref 1.8–7.4)
NEUTROPHILS NFR BLD AUTO: 90 % — HIGH (ref 43–77)
NRBC # BLD: 0 /100 WBCS — SIGNIFICANT CHANGE UP (ref 0–0)
PLATELET # BLD AUTO: 320 K/UL — SIGNIFICANT CHANGE UP (ref 150–400)
POTASSIUM SERPL-SCNC: 4.4 MMOL/L
PROT SERPL-MCNC: 7.1 G/DL
PT BLD: 11.4 SEC
RBC # BLD: 4.91 M/UL — SIGNIFICANT CHANGE UP (ref 3.8–5.2)
RBC # FLD: 13 % — SIGNIFICANT CHANGE UP (ref 10.3–14.5)
SODIUM SERPL-SCNC: 146 MMOL/L
WBC # BLD: 9.17 K/UL — SIGNIFICANT CHANGE UP (ref 3.8–10.5)
WBC # FLD AUTO: 9.17 K/UL — SIGNIFICANT CHANGE UP (ref 3.8–10.5)

## 2020-03-30 NOTE — REASON FOR VISIT
[Initial Consultation] : an initial consultation [Family Member] : family member [FreeTextEntry1] : Referred by Dr. Fuentes\par Telephone visit:\par Due to the COVID19 pandemic, this interview was conducted over the telephone, with the patient and her . I was not able to do a physical exam during this interview. 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 She is being referred to medical oncology for treatment considerations for recurrent metastatic disease.\par \par Ms. Manriquez initially presented to her GYN (Dr. Powell, 5/17/18) for postmenopausal bleeding that began shortly after Lap cholecystectomy (4/9/18). She underwent a Pelvic US that showed 10.8 x 6.7 x 6.1 cm uterus with fibroids. She underwent endometrial biopsy with Dr. English at [place] on 5/21/18. Pathology was significant for endometrial adenocarcinoma, endometroid type, FIGO grade 1 nuclear grade 2 with foci of squamous differentiation. \par \par MRI of the pelvis on 5/24/18 showed a large mass involving the lower uterine segment and body. There were no pathologically enlarged lymph nodes but there was a large heterogeneous lesion involving the left sacrum suspicious for osseous metastatic disease. She underwent biopsy of the left sacral lesion which was read as consistent with hemangioma to rule out metastatic disease.\par \par She subsequently underwent comprehensive surgical staging by Dr. Fuentes including RTLH, BSO, SLN, PPALND on 7/19/18. Pathology for significant for FIGO grade 1 nuclear grade 2 invasive adenocarcinoma with LVSI and 17/20 mm muscle invasion, stage IB. There was no cervical stromal invasion, lower uterine segment invasion, or uterine serosal involvement. All the sampled left pelvic, right pelvic and para-aortic nodes were free of cancer. \par \par Postoperatively she did well and received vaginal brachytherapy 2500 cGy in 5 fractions over 10 days completed 9/17/18 with Dr. Littlejohn of radiation oncology. \par \par She felt well without any concerning symptoms and was PHUONG under surveillance until she reported new left sided abdominal pain and a firmness. In retrospect, she thinks that she felt something different in her L abdomen/pelvis for the past 1-2 months. She followed up with Dr. Fuentes who palpated a 3cm left sided mass under robotic trochar incision suspicious for recurrence. \par \par CT CAP 2/15/19 demonstrated:\par New pulmonary nodule (two subcm nodules, new), external iliac lymphadenopathy, soft tissue intraperitoneal implants, and intramuscular enhancing nodules consistent with metastatic disease. \par \par PERITONEUM: Trace amount of pelvic fluid. There multiple new soft tissue masses, for instance \par adjacent to the descending colon along the left paracolic gutter measuring 4.6 x 3.1 x 2.2 cm and \par within the right lower quadrant measuring 3.6 x 3.4 x 1.8 cm. \par VESSELS: Within normal limits. \par RETROPERITONEUM/LYMPH NODES: Bilateral new enlarged external iliac lymph nodes (2:133) \par measuring up to 2.2 x 1.7 cm on the left \par ABDOMINAL WALL: Within the left rectus muscle there is an enhancing mass measuring 5.0 x 2.8 x \par 5.7 cm, with an additional nodule within the anterior subcutaneous tissues. There is an additional 2 \par cm mass abutting the inferior left rectus muscle. \par BONES: Within normal limits. \par \par She underwent US-guided abdominal wall mass biopsy on 3/6 by IR. Pathology review from the biopsy demonstrated metastatic adenocarcinoma, c/w metastasis from patient's known history of endometroid endometrial adenocarcinoma. \par \par Addendum\par Addendum issued to report results of additional immunostains\par P53:Wild type; Ki-67 is mildly elevated, WT1 is negative\par The cytomorphology in conjunction with the immunophenotype is consistent with metastases from a primary endometrioid endometrial adenocarcinoma.\par \par She has been referred to medical oncology to discuss systemic treatment for her recurrent endometrial cancer. She feels very well overall, although she continues to feel the intermittent L sided abdominal discomfort and pulling sensation. She is working full time and lives with her . She has a daughter. She has a good appetite and denies any weight changes.

## 2020-03-30 NOTE — ASSESSMENT
[FreeTextEntry1] : Ms. Manriquez is a 59 y/o postmenopausal F with history of stage IB endometrioid endometrial adenocarcinoma, grade 2 +LVSI, 17/20mm muscle invasion s/p RTLH/BSO, SLN, PPALND in 7/2018 followed by adjuvant vaginal brachytherapy completed in 11/2018, who was PHUONG until February 2020 when she was found to have recurrent disease, now biopsy-proven from new abdominal wall mass. She presents to medical oncology for consideration of treatment options. \par \par I reviewed the patient's oncologic workup to date including the recent CT scans and pathology results from the biopsy of the abdominal wall mass. We reviewed that these findings are consistent with recurrent metastatic  disease from her known endometrial cancer. \par \par I discussed the rationale, and recommendations for systemic chemotherapy in this setting. We reviewed the potential benefits and risks, as well as possible toxicities and side effects of chemotherapy with carboplatin and paclitaxel, which include but are not limited to: fatigue, n/v, constipation, diarrhea,alopecia, neuropathy, decreased appetite, renal and liver dysfunction, cytopenias with increased risk of infections and allergic reactions. We also discussed the potential need for growth factor support and transfusions if necessary.\par \par The patient is in agreement with the plan of care. The patient and her 's questions and concerns were addressed in full. \par \par Plan: \par -plan for pre-chemotherapy visit for bloodwork and EKG \par -televisit and chemotherapy consent was obtained verbally with the patient over the telephone during this visit. Plan to obtain PE and written consent with me when patient presents in person for chemotherapy \par -tentatively schedule patient to start chemotherapy in 1 week

## 2020-03-31 DIAGNOSIS — Z51.11 ENCOUNTER FOR ANTINEOPLASTIC CHEMOTHERAPY: ICD-10-CM

## 2020-03-31 DIAGNOSIS — R11.2 NAUSEA WITH VOMITING, UNSPECIFIED: ICD-10-CM

## 2020-04-07 ENCOUNTER — TRANSCRIPTION ENCOUNTER (OUTPATIENT)
Age: 60
End: 2020-04-07

## 2020-04-08 ENCOUNTER — APPOINTMENT (OUTPATIENT)
Dept: HEMATOLOGY ONCOLOGY | Facility: CLINIC | Age: 60
End: 2020-04-08
Payer: COMMERCIAL

## 2020-04-08 PROCEDURE — 99213 OFFICE O/P EST LOW 20 MIN: CPT | Mod: 95

## 2020-04-08 PROCEDURE — 99203 OFFICE O/P NEW LOW 30 MIN: CPT | Mod: 95

## 2020-04-08 NOTE — PHYSICAL EXAM
[Fully active, able to carry on all pre-disease performance without restriction] : Status 0 - Fully active, able to carry on all pre-disease performance without restriction [Normal] : well developed, well nourished, in no acute distress [de-identified] : Normal respiratory effort

## 2020-04-08 NOTE — ASSESSMENT
[FreeTextEntry1] : Ms. Manriquez is a 59 y/o postmenopausal F with history of stage IB endometrioid endometrial adenocarcinoma, grade 2 +LVSI, 17/20mm muscle invasion s/p RTLH/BSO, SLN, PPALND in 7/2018 followed by adjuvant vaginal brachytherapy completed in 11/2018, who was PHUONG until February 2020 when she was found to have recurrent metastatic disease, now biopsy-proven from new abdominal wall mass. \par \par S/p C1 carbo/taxol one week ago\par Feeling well, tolerating without significant toxicities so far.\par We discussed adding miralax and colace to senna for constipation. \par \par Plan: \par -plan for bloodwork prior to C2 \par -chemotherapy consent was obtained in person on day of chemotherapy 3/30/20\par -C2 scheduled 4/17/20\par \par The patient is in agreement with the plan of care. The patient's questions and concerns were addressed in full. \par \par

## 2020-04-08 NOTE — REVIEW OF SYSTEMS
Chief Complaint   Patient presents with     Blood Draw      labs drawn successfully; vitals taken     Zelda Rice CMA       [Negative] : Musculoskeletal [de-identified] : mild constipation

## 2020-04-08 NOTE — HISTORY OF PRESENT ILLNESS
[Home] : at home, [unfilled] , at the time of the visit. [Medical Office: ([facility])___] : at ~his/her~ medical office located in [place] [Patient] : the patient [Self] : self [de-identified] : Referred by Dr. Fuentes\par Telephone visit:\par Due to the COVID19 pandemic, this interview was conducted over the telephone, with the patient and her . I was not able to do a physical exam during this interview. 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 She is being referred to medical oncology for treatment considerations for recurrent metastatic disease.\par \par Ms. Manriquez initially presented to her GYN (Dr. Powell, 5/17/18) for postmenopausal bleeding that began shortly after Lap cholecystectomy (4/9/18). She underwent a Pelvic US that showed 10.8 x 6.7 x 6.1 cm uterus with fibroids. She underwent endometrial biopsy with Dr. English at [place] on 5/21/18. Pathology was significant for endometrial adenocarcinoma, endometroid type, FIGO grade 1 nuclear grade 2 with foci of squamous differentiation. \par \par MRI of the pelvis on 5/24/18 showed a large mass involving the lower uterine segment and body. There were no pathologically enlarged lymph nodes but there was a large heterogeneous lesion involving the left sacrum suspicious for osseous metastatic disease. She underwent biopsy of the left sacral lesion which was read as consistent with hemangioma to rule out metastatic disease.\par \par She subsequently underwent comprehensive surgical staging by Dr. Fuentes including RTLH, BSO, SLN, PPALND on 7/19/18. Pathology for significant for FIGO grade 1 nuclear grade 2 invasive adenocarcinoma with LVSI and 17/20 mm muscle invasion, stage IB. There was no cervical stromal invasion, lower uterine segment invasion, or uterine serosal involvement. All the sampled left pelvic, right pelvic and para-aortic nodes were free of cancer. \par \par Postoperatively she did well and received vaginal brachytherapy 2500 cGy in 5 fractions over 10 days completed 9/17/18 with Dr. Littlejohn of radiation oncology. \par \par She felt well without any concerning symptoms and was PHUONG under surveillance until she reported new left sided abdominal pain and a firmness. In retrospect, she thinks that she felt something different in her L abdomen/pelvis for the past 1-2 months. She followed up with Dr. Fuentes who palpated a 3cm left sided mass under robotic trochar incision suspicious for recurrence. \par \par CT CAP 2/15/19 demonstrated:\par New pulmonary nodule (two subcm nodules, new), external iliac lymphadenopathy, soft tissue intraperitoneal implants, and intramuscular enhancing nodules consistent with metastatic disease. \par \par PERITONEUM: Trace amount of pelvic fluid. There multiple new soft tissue masses, for instance \par adjacent to the descending colon along the left paracolic gutter measuring 4.6 x 3.1 x 2.2 cm and \par within the right lower quadrant measuring 3.6 x 3.4 x 1.8 cm. \par VESSELS: Within normal limits. \par RETROPERITONEUM/LYMPH NODES: Bilateral new enlarged external iliac lymph nodes (2:133) \par measuring up to 2.2 x 1.7 cm on the left \par ABDOMINAL WALL: Within the left rectus muscle there is an enhancing mass measuring 5.0 x 2.8 x \par 5.7 cm, with an additional nodule within the anterior subcutaneous tissues. There is an additional 2 \par cm mass abutting the inferior left rectus muscle. \par BONES: Within normal limits. \par \par She underwent US-guided abdominal wall mass biopsy on 3/6 by IR. Pathology review from the biopsy demonstrated metastatic adenocarcinoma, c/w metastasis from patient's known history of endometroid endometrial adenocarcinoma. \par \par Addendum\par Addendum issued to report results of additional immunostains\par P53:Wild type; Ki-67 is mildly elevated, WT1 is negative\par The cytomorphology in conjunction with the immunophenotype is consistent with metastases from a primary endometrioid endometrial adenocarcinoma.\par \par She has been referred to medical oncology to discuss systemic treatment for her recurrent endometrial cancer. She feels very well overall, although she continues to feel the intermittent L sided abdominal discomfort and pulling sensation. She is working full time and lives with her . She has a daughter. She has a good appetite and denies any weight changes.  [de-identified] : The patient is here for follow up after C1 carbo/taxol on 3/30/20. She feels well overall since treatment last week, with good energy and appetite. But she went to the urgent care yesterday for a "pulled hamstring" of her left leg while moving furniture at home. She was given a muscle relaxant (patient was not able to find medical name at time of call). She is able to mobilize and perform ADLS. ROS + for constipation, has not had BM for 2 days. Otherwise she feels well and denies fevers, chills, nausea, vomiting, abdominal pain, neuropathy, urinary symptoms, vaginal bleeding/discharge, CP or SOB.

## 2020-04-10 ENCOUNTER — LABORATORY RESULT (OUTPATIENT)
Age: 60
End: 2020-04-10

## 2020-04-13 LAB
ALBUMIN SERPL ELPH-MCNC: 4.4 G/DL
ALP BLD-CCNC: 123 U/L
ALT SERPL-CCNC: 49 U/L
ANION GAP SERPL CALC-SCNC: 17 MMOL/L
AST SERPL-CCNC: 38 U/L
BASOPHILS # BLD AUTO: 0.02 K/UL
BASOPHILS NFR BLD AUTO: 0.9 %
BILIRUB SERPL-MCNC: 0.5 MG/DL
BUN SERPL-MCNC: 17 MG/DL
CALCIUM SERPL-MCNC: 9.4 MG/DL
CHLORIDE SERPL-SCNC: 100 MMOL/L
CO2 SERPL-SCNC: 23 MMOL/L
CREAT SERPL-MCNC: 0.96 MG/DL
EOSINOPHIL # BLD AUTO: 0.05 K/UL
EOSINOPHIL NFR BLD AUTO: 2.6 %
GLUCOSE SERPL-MCNC: 108 MG/DL
HCT VFR BLD CALC: 32.9 %
HGB BLD-MCNC: 10.5 G/DL
LYMPHOCYTES # BLD AUTO: 1.46 K/UL
LYMPHOCYTES NFR BLD AUTO: 72.8 %
MAGNESIUM SERPL-MCNC: 2.1 MG/DL
MAN DIFF?: NORMAL
MCHC RBC-ENTMCNC: 25.7 PG
MCHC RBC-ENTMCNC: 31.9 GM/DL
MCV RBC AUTO: 80.6 FL
MONOCYTES # BLD AUTO: 0.28 K/UL
MONOCYTES NFR BLD AUTO: 14 %
NEUTROPHILS # BLD AUTO: 0.16 K/UL
NEUTROPHILS NFR BLD AUTO: 7.9 %
PLATELET # BLD AUTO: 221 K/UL
POTASSIUM SERPL-SCNC: 4.3 MMOL/L
PROT SERPL-MCNC: 6.7 G/DL
RBC # BLD: 4.08 M/UL
RBC # FLD: 12.4 %
SODIUM SERPL-SCNC: 139 MMOL/L
WBC # FLD AUTO: 2.01 K/UL

## 2020-04-14 NOTE — HISTORY OF PRESENT ILLNESS
[de-identified] : Endometrial Cancer Age 58\par s/p  MELANIE/BSO/PPALND/8/2018- Dr. Fuentes \par Metasatic Adenocarcinoma to the  Abdominal Wall- Age 60  [de-identified] : Endometrioid adenocarcinoma  FIGO Grade I  [de-identified] : WT1 Negative \par Ki 67 Mildly Elevated \par MSI Intact  [de-identified] : Pt reports for initial physical exam per her request,  side effect review and discussion of  symptom management \par EKG completed

## 2020-04-14 NOTE — HISTORY OF PRESENT ILLNESS
[de-identified] : Endometrial Cancer Age 58\par s/p  MELANIE/BSO/PPALND/8/2018- Dr. Fuentes \par Metasatic Adenocarcinoma to the  Abdominal Wall- Age 60  [de-identified] : Endometrioid adenocarcinoma  FIGO Grade I  [de-identified] : WT1 Negative \par Ki 67 Mildly Elevated \par MSI Intact  [de-identified] : Pt reports for initial physical exam per her request,  side effect review and discussion of  symptom management \par EKG completed

## 2020-04-14 NOTE — ASSESSMENT
"SUBJECTIVE:  Chief Complaint   Patient presents with     Urgent Care     Pt reports that about 3 days ago, he was walking then felt a severe sharp pain on the back of his right lower leg. Now, pt has brusing and swelling on the akle and the back of the leg.     Durga Aguirre is a 60 year old male presents with a chief complaint of right calf tear and localized injury .  The injury occurred few days  ago.    The injury happened while walking . How: walking briskly .  The patient complained of mild pain  and has not had decreased ROM.  Pain exacerbated by walking.  Relieved by rest.  He treated it initially with nothing. This is the first time this type of injury has occurred to this patient.     Past Medical History   Diagnosis Date     Other spontaneous pneumothorax 1997     Alcohol abuse- remission 2/18/2013     No Known Allergies  Social History   Substance Use Topics     Smoking status: Current Every Day Smoker -- 20 years     Types: Cigarettes     Smokeless tobacco: Never Used      Comment: 2-3 cigarettes per day      Alcohol Use: No      Comment: restarted may 2016       ROS:  CONSTITUTIONAL:NEGATIVE for fever, chills, change in weight  INTEGUMENTARY/SKIN: POSITIVE for bruising around ankle  MUSCULOSKELETAL: Positive for tenderness of right calf  NEURO: NEGATIVE for weakness, dizziness or paresthesias    EXAM:   /70 mmHg  Pulse 76  Temp(Src) 98.4  F (36.9  C)  Ht 5' 10\" (1.778 m)  Wt 203 lb (92.08 kg)  BMI 29.13 kg/m2  SpO2 97%  Gen: healthy, alert, active and healthy,alert,no distress  Extremity: leg has point tenderness .   There is not compromise to the distal circulation.  Pulses are +2 and CRT is brisk  EXTREMITIES: peripheral pulses normal  MS:  Positive for right calf tenderness, localized pain  SKIN: Positive for mild bruising around right ankle  NEURO: Normal strength and tone, sensory exam grossly normal, mentation intact and speech normal    X-RAY was not done.     ASSESSMENT/PLAN;      " [FreeTextEntry1] : Plan for Carboplatin/Paclitaxel Cycle 1 Monday  3/30 \par Pt verbalized understanding of potential treatment side effects not limited to alopecia, fatigue, myelosuppression, arthralgias, nausea/emesis and other GI dysfunction\par She was provided with OTC and rX medication for treatment management \par Pt verbalized understanding of when to contact office *i.e. fevers, respiratory or any infective type processes\par Pt will see Dr. Calvert  3/30 in person for treatment consent. \par \par   ICD-10-CM    1. Gastrocnemius muscle tear, right, initial encounter S86.811A    2. Bruising T14.8    3. Muscle cramps R25.2      Patient declines cam walker  Patient declines going to orthopedics    Patient advised to see [facility] and have evaluation, possible MRI to determine degree of calf tear

## 2020-04-14 NOTE — ASSESSMENT
[FreeTextEntry1] : Plan for Carboplatin/Paclitaxel Cycle 1 Monday  3/30 \par Pt verbalized understanding of potential treatment side effects not limited to alopecia, fatigue, myelosuppression, arthralgias, nausea/emesis and other GI dysfunction\par She was provided with OTC and rX medication for treatment management \par Pt verbalized understanding of when to contact office *i.e. fevers, respiratory or any infective type processes\par Pt will see Dr. Calvert  3/30 in person for treatment consent. \par \par

## 2020-04-17 ENCOUNTER — APPOINTMENT (OUTPATIENT)
Dept: INFUSION THERAPY | Facility: HOSPITAL | Age: 60
End: 2020-04-17

## 2020-04-17 ENCOUNTER — RESULT REVIEW (OUTPATIENT)
Age: 60
End: 2020-04-17

## 2020-04-17 ENCOUNTER — LABORATORY RESULT (OUTPATIENT)
Age: 60
End: 2020-04-17

## 2020-04-17 LAB
BASOPHILS # BLD AUTO: 0.04 K/UL — SIGNIFICANT CHANGE UP (ref 0–0.2)
BASOPHILS NFR BLD AUTO: 0.6 % — SIGNIFICANT CHANGE UP (ref 0–2)
EOSINOPHIL # BLD AUTO: 0.08 K/UL — SIGNIFICANT CHANGE UP (ref 0–0.5)
EOSINOPHIL NFR BLD AUTO: 1.2 % — SIGNIFICANT CHANGE UP (ref 0–6)
HCT VFR BLD CALC: 30.9 % — LOW (ref 34.5–45)
HGB BLD-MCNC: 10.2 G/DL — LOW (ref 11.5–15.5)
IMM GRANULOCYTES NFR BLD AUTO: 4 % — HIGH (ref 0–1.5)
LYMPHOCYTES # BLD AUTO: 1.45 K/UL — SIGNIFICANT CHANGE UP (ref 1–3.3)
LYMPHOCYTES # BLD AUTO: 22.2 % — SIGNIFICANT CHANGE UP (ref 13–44)
MCHC RBC-ENTMCNC: 26.1 PG — LOW (ref 27–34)
MCHC RBC-ENTMCNC: 33 GM/DL — SIGNIFICANT CHANGE UP (ref 32–36)
MCV RBC AUTO: 79 FL — LOW (ref 80–100)
MONOCYTES # BLD AUTO: 0.51 K/UL — SIGNIFICANT CHANGE UP (ref 0–0.9)
MONOCYTES NFR BLD AUTO: 7.8 % — SIGNIFICANT CHANGE UP (ref 2–14)
NEUTROPHILS # BLD AUTO: 4.18 K/UL — SIGNIFICANT CHANGE UP (ref 1.8–7.4)
NEUTROPHILS NFR BLD AUTO: 64.2 % — SIGNIFICANT CHANGE UP (ref 43–77)
PLATELET # BLD AUTO: 337 K/UL — SIGNIFICANT CHANGE UP (ref 150–400)
RBC # BLD: 3.91 M/UL — SIGNIFICANT CHANGE UP (ref 3.8–5.2)
RBC # FLD: 12.9 % — SIGNIFICANT CHANGE UP (ref 10.3–14.5)
WBC # BLD: 6.52 K/UL — SIGNIFICANT CHANGE UP (ref 3.8–10.5)
WBC # FLD AUTO: 6.52 K/UL — SIGNIFICANT CHANGE UP (ref 3.8–10.5)

## 2020-04-18 LAB — NRBC # BLD: 0 /100 WBCS — SIGNIFICANT CHANGE UP (ref 0–0)

## 2020-04-22 ENCOUNTER — OUTPATIENT (OUTPATIENT)
Dept: OUTPATIENT SERVICES | Facility: HOSPITAL | Age: 60
LOS: 1 days | Discharge: HOME | End: 2020-04-22

## 2020-04-22 DIAGNOSIS — Z90.49 ACQUIRED ABSENCE OF OTHER SPECIFIED PARTS OF DIGESTIVE TRACT: Chronic | ICD-10-CM

## 2020-04-22 DIAGNOSIS — C54.1 MALIGNANT NEOPLASM OF ENDOMETRIUM: ICD-10-CM

## 2020-04-22 DIAGNOSIS — Z98.890 OTHER SPECIFIED POSTPROCEDURAL STATES: Chronic | ICD-10-CM

## 2020-04-22 DIAGNOSIS — Z98.891 HISTORY OF UTERINE SCAR FROM PREVIOUS SURGERY: Chronic | ICD-10-CM

## 2020-04-29 ENCOUNTER — RESULT REVIEW (OUTPATIENT)
Age: 60
End: 2020-04-29

## 2020-04-29 ENCOUNTER — APPOINTMENT (OUTPATIENT)
Dept: HEMATOLOGY ONCOLOGY | Facility: CLINIC | Age: 60
End: 2020-04-29
Payer: COMMERCIAL

## 2020-04-29 ENCOUNTER — APPOINTMENT (OUTPATIENT)
Dept: HEMATOLOGY ONCOLOGY | Facility: CLINIC | Age: 60
End: 2020-04-29

## 2020-04-29 DIAGNOSIS — R23.8 OTHER SKIN CHANGES: ICD-10-CM

## 2020-04-29 LAB
ALBUMIN SERPL ELPH-MCNC: 4.5 G/DL
ALP BLD-CCNC: 111 U/L
ALT SERPL-CCNC: 16 U/L
ANION GAP SERPL CALC-SCNC: 14 MMOL/L
AST SERPL-CCNC: 14 U/L
BASOPHILS # BLD AUTO: 0.03 K/UL — SIGNIFICANT CHANGE UP (ref 0–0.2)
BASOPHILS NFR BLD AUTO: 1 % — SIGNIFICANT CHANGE UP (ref 0–2)
BILIRUB SERPL-MCNC: 0.3 MG/DL
BUN SERPL-MCNC: 18 MG/DL
CALCIUM SERPL-MCNC: 9.3 MG/DL
CHLORIDE SERPL-SCNC: 105 MMOL/L
CO2 SERPL-SCNC: 22 MMOL/L
CREAT SERPL-MCNC: 0.8 MG/DL
EOSINOPHIL # BLD AUTO: 0.06 K/UL — SIGNIFICANT CHANGE UP (ref 0–0.5)
EOSINOPHIL NFR BLD AUTO: 2 % — SIGNIFICANT CHANGE UP (ref 0–6)
GLUCOSE SERPL-MCNC: 110 MG/DL
HCT VFR BLD CALC: 30.8 % — LOW (ref 34.5–45)
HGB BLD-MCNC: 10.1 G/DL — LOW (ref 11.5–15.5)
LYMPHOCYTES # BLD AUTO: 1.2 K/UL — SIGNIFICANT CHANGE UP (ref 1–3.3)
LYMPHOCYTES # BLD AUTO: 43 % — SIGNIFICANT CHANGE UP (ref 13–44)
MAGNESIUM SERPL-MCNC: 2 MG/DL
MCHC RBC-ENTMCNC: 26.4 PG — LOW (ref 27–34)
MCHC RBC-ENTMCNC: 32.8 GM/DL — SIGNIFICANT CHANGE UP (ref 32–36)
MCV RBC AUTO: 80.4 FL — SIGNIFICANT CHANGE UP (ref 80–100)
MONOCYTES # BLD AUTO: 0.42 K/UL — SIGNIFICANT CHANGE UP (ref 0–0.9)
MONOCYTES NFR BLD AUTO: 15 % — HIGH (ref 2–14)
NEUTROPHILS # BLD AUTO: 1.09 K/UL — LOW (ref 1.8–7.4)
NEUTROPHILS NFR BLD AUTO: 39 % — LOW (ref 43–77)
NRBC # BLD: 0 /100 — SIGNIFICANT CHANGE UP (ref 0–0)
NRBC # BLD: SIGNIFICANT CHANGE UP /100 WBCS (ref 0–0)
PLAT MORPH BLD: NORMAL — SIGNIFICANT CHANGE UP
PLATELET # BLD AUTO: 231 K/UL — SIGNIFICANT CHANGE UP (ref 150–400)
POTASSIUM SERPL-SCNC: 4.3 MMOL/L
PROT SERPL-MCNC: 6.6 G/DL
RBC # BLD: 3.83 M/UL — SIGNIFICANT CHANGE UP (ref 3.8–5.2)
RBC # FLD: 14.7 % — HIGH (ref 10.3–14.5)
RBC BLD AUTO: SIGNIFICANT CHANGE UP
SODIUM SERPL-SCNC: 142 MMOL/L
WBC # BLD: 2.8 K/UL — LOW (ref 3.8–10.5)
WBC # FLD AUTO: 2.8 K/UL — LOW (ref 3.8–10.5)

## 2020-04-29 PROCEDURE — 99214 OFFICE O/P EST MOD 30 MIN: CPT | Mod: 95

## 2020-04-29 RX ORDER — CLOBETASOL PROPIONATE 0.05 G/100ML
0.05 LOTION TOPICAL DAILY
Qty: 1 | Refills: 1 | Status: ACTIVE | COMMUNITY
Start: 2020-04-29 | End: 1900-01-01

## 2020-04-29 RX ORDER — DEXAMETHASONE 4 MG/1
4 TABLET ORAL
Qty: 10 | Refills: 0 | Status: DISCONTINUED | COMMUNITY
Start: 2020-03-23 | End: 2020-04-29

## 2020-04-29 NOTE — PAST MEDICAL HISTORY
[Postmenopausal] : The patient is postmenopausal [Menopause Age____] : age at menopause was [unfilled] [Menarche Age ____] : age at menarche was [unfilled] [Total Preg ___] : G[unfilled] [Definite ___ (Date)] : the last menstrual period was [unfilled] [Live Births ___] : P[unfilled]  [Abortions ___] : Abortions:[unfilled] [Full Term ___] : Full Term: [unfilled] [Living ___] : Living: [unfilled]

## 2020-05-08 ENCOUNTER — APPOINTMENT (OUTPATIENT)
Dept: INFUSION THERAPY | Facility: HOSPITAL | Age: 60
End: 2020-05-08

## 2020-05-08 ENCOUNTER — RESULT REVIEW (OUTPATIENT)
Age: 60
End: 2020-05-08

## 2020-05-08 ENCOUNTER — APPOINTMENT (OUTPATIENT)
Dept: HEMATOLOGY ONCOLOGY | Facility: CLINIC | Age: 60
End: 2020-05-08
Payer: COMMERCIAL

## 2020-05-08 DIAGNOSIS — G62.9 POLYNEUROPATHY, UNSPECIFIED: ICD-10-CM

## 2020-05-08 DIAGNOSIS — T45.1X5D ADVERSE EFFECT OF ANTINEOPLASTIC AND IMMUNOSUPPRESSIVE DRUGS, SUBSEQUENT ENCOUNTER: ICD-10-CM

## 2020-05-08 LAB
BASOPHILS # BLD AUTO: 0.02 K/UL — SIGNIFICANT CHANGE UP (ref 0–0.2)
BASOPHILS NFR BLD AUTO: 0.4 % — SIGNIFICANT CHANGE UP (ref 0–2)
EOSINOPHIL # BLD AUTO: 0.02 K/UL — SIGNIFICANT CHANGE UP (ref 0–0.5)
EOSINOPHIL NFR BLD AUTO: 0.4 % — SIGNIFICANT CHANGE UP (ref 0–6)
HCT VFR BLD CALC: 31.6 % — LOW (ref 34.5–45)
HGB BLD-MCNC: 10.4 G/DL — LOW (ref 11.5–15.5)
IMM GRANULOCYTES NFR BLD AUTO: 1.6 % — HIGH (ref 0–1.5)
LYMPHOCYTES # BLD AUTO: 1.38 K/UL — SIGNIFICANT CHANGE UP (ref 1–3.3)
LYMPHOCYTES # BLD AUTO: 24.3 % — SIGNIFICANT CHANGE UP (ref 13–44)
MCHC RBC-ENTMCNC: 26.1 PG — LOW (ref 27–34)
MCHC RBC-ENTMCNC: 32.9 GM/DL — SIGNIFICANT CHANGE UP (ref 32–36)
MCV RBC AUTO: 79.2 FL — LOW (ref 80–100)
MONOCYTES # BLD AUTO: 0.52 K/UL — SIGNIFICANT CHANGE UP (ref 0–0.9)
MONOCYTES NFR BLD AUTO: 9.2 % — SIGNIFICANT CHANGE UP (ref 2–14)
NEUTROPHILS # BLD AUTO: 3.64 K/UL — SIGNIFICANT CHANGE UP (ref 1.8–7.4)
NEUTROPHILS NFR BLD AUTO: 64.1 % — SIGNIFICANT CHANGE UP (ref 43–77)
NRBC # BLD: 0 /100 WBCS — SIGNIFICANT CHANGE UP (ref 0–0)
PLATELET # BLD AUTO: 227 K/UL — SIGNIFICANT CHANGE UP (ref 150–400)
RBC # BLD: 3.99 M/UL — SIGNIFICANT CHANGE UP (ref 3.8–5.2)
RBC # FLD: 15.2 % — HIGH (ref 10.3–14.5)
WBC # BLD: 5.67 K/UL — SIGNIFICANT CHANGE UP (ref 3.8–10.5)
WBC # FLD AUTO: 5.67 K/UL — SIGNIFICANT CHANGE UP (ref 3.8–10.5)

## 2020-05-08 PROCEDURE — 99213 OFFICE O/P EST LOW 20 MIN: CPT

## 2020-05-11 DIAGNOSIS — R11.2 NAUSEA WITH VOMITING, UNSPECIFIED: ICD-10-CM

## 2020-05-11 DIAGNOSIS — Z51.11 ENCOUNTER FOR ANTINEOPLASTIC CHEMOTHERAPY: ICD-10-CM

## 2020-05-12 PROBLEM — G62.9 NEUROPATHY: Status: ACTIVE | Noted: 2020-05-08

## 2020-05-12 PROBLEM — T45.1X5D ADVERSE EFFECT OF CHEMOTHERAPY, SUBSEQUENT ENCOUNTER: Status: ACTIVE | Noted: 2020-05-08

## 2020-05-12 NOTE — VITALS
[Maximal Pain Intensity: 0/10] : 0/10 [Least Pain Intensity: 0/10] : 0/10 [90: Able to carry normal activity; minor signs or symptoms of disease.] : 90: Able to carry normal activity; minor signs or symptoms of disease.  [90: Minor restrictions in physically strenous activity.] : 90: Minor restrictions in physically strenuous activity. [ECOG Performance Status: 0 - Fully active, able to carry on all pre-disease performance without restriction] : Performance Status: 0 - Fully active, able to carry on all pre-disease performance without restriction

## 2020-05-12 NOTE — REASON FOR VISIT
[Routine Follow-Up] : routine follow-up visit for [Other: _____] : [unfilled] [Endometrial Cancer] : endometrial cancer

## 2020-05-13 ENCOUNTER — APPOINTMENT (OUTPATIENT)
Dept: RADIATION ONCOLOGY | Facility: CLINIC | Age: 60
End: 2020-05-13
Payer: COMMERCIAL

## 2020-05-13 PROCEDURE — 99213 OFFICE O/P EST LOW 20 MIN: CPT | Mod: 95

## 2020-05-14 NOTE — REVIEW OF SYSTEMS
[Fatigue] : fatigue [Negative] : Integumentary [Constipation: Grade 0] : Constipation: Grade 0 [Diarrhea: Grade 0] : Diarrhea: Grade 0 [Localized Edema: Grade 0] : Localized Edema: Grade 0  [Hematuria: Grade 0] : Hematuria: Grade 0 [Urinary Tract Pain: Grade 0] : Urinary Tract Pain: Grade 0 [Urinary Incontinence: Grade 0] : Urinary Incontinence: Grade 0  [Genital Edema: Grade 0] : Genital Edema: Grade 0 [Vaginal Stricture: Grade 0] : Vaginal Stricture: Grade 0 [Cough: Grade 0] : Cough: Grade 0 [Dyspnea: Grade 0] : Dyspnea: Grade 0 [FreeTextEntry8] : Not sexually actve

## 2020-05-14 NOTE — HISTORY OF PRESENT ILLNESS
[Home] : at home, [unfilled] , at the time of the visit. [Medical Office: ([facility])___] : at the medical office located in  [Patient] : the patient [Self] : self [FreeTextEntry1] : Ms. 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 She is s/p comprehensive surgical staging.followed by adjuvant vaginal brachytherapy to the vaginal cuff between 9/7/18 - 9/17/18 to a dose of 2500 cGy. Patient tolerated radiation treatment well.\par \par Since last visit in November 2019, she was developed new left sided abdominal pain. On exam, a mass was palpated under robotic trochar incision suspicious for recurrence. CT CAP 2/15/19 demonstrated new pulmonary nodule (two subcm nodules, new), external iliac lymphadenopathy, soft tissue intraperitoneal implants, and intramuscular enhancing nodules consistent with metastatic disease. US guided bx of abdominal wall mass on 3/6/20 demonstrated metastatic adenocarcinoma from primary endometrial adenocarcinoma. She started carbo/taxol 3/30/20. Last cycle on 5/8/20 had a reaction to Taxol and will switch to Abraxane\par \par \par She presents here today for follow up. She denies fatigue. Occasional pain to the left abdominal region which subsides with Tylenol prn. No urinary or bowel changes. No vaginal bleeding or discharge.

## 2020-05-14 NOTE — DISEASE MANAGEMENT
[Pathological] : TNM Stage: p [IB] : IB [FreeTextEntry4] : uterine [MTNM] : o [TTNM] : 1b [NTNM] : o

## 2020-05-19 LAB
ALBUMIN SERPL ELPH-MCNC: 4.5 G/DL
ALP BLD-CCNC: 96 U/L
ALT SERPL-CCNC: 13 U/L
ANION GAP SERPL CALC-SCNC: 15 MMOL/L
AST SERPL-CCNC: 14 U/L
BASOPHILS # BLD AUTO: 0.03 K/UL
BASOPHILS NFR BLD AUTO: 0.5 %
BILIRUB SERPL-MCNC: 0.4 MG/DL
BUN SERPL-MCNC: 20 MG/DL
CALCIUM SERPL-MCNC: 9.6 MG/DL
CHLORIDE SERPL-SCNC: 102 MMOL/L
CO2 SERPL-SCNC: 23 MMOL/L
CREAT SERPL-MCNC: 0.78 MG/DL
EOSINOPHIL # BLD AUTO: 0.03 K/UL
EOSINOPHIL NFR BLD AUTO: 0.5 %
GLUCOSE SERPL-MCNC: 93 MG/DL
HCT VFR BLD CALC: 33.1 %
HGB BLD-MCNC: 10.1 G/DL
IMM GRANULOCYTES NFR BLD AUTO: 0.5 %
LYMPHOCYTES # BLD AUTO: 1.55 K/UL
LYMPHOCYTES NFR BLD AUTO: 25.2 %
MAGNESIUM SERPL-MCNC: 1.9 MG/DL
MAN DIFF?: NORMAL
MCHC RBC-ENTMCNC: 26.2 PG
MCHC RBC-ENTMCNC: 30.5 GM/DL
MCV RBC AUTO: 85.8 FL
MONOCYTES # BLD AUTO: 0.51 K/UL
MONOCYTES NFR BLD AUTO: 8.3 %
NEUTROPHILS # BLD AUTO: 3.99 K/UL
NEUTROPHILS NFR BLD AUTO: 65 %
PLATELET # BLD AUTO: 269 K/UL
POTASSIUM SERPL-SCNC: 4.6 MMOL/L
PROT SERPL-MCNC: 6.8 G/DL
RBC # BLD: 3.86 M/UL
RBC # FLD: 16 %
SODIUM SERPL-SCNC: 140 MMOL/L
WBC # FLD AUTO: 6.14 K/UL

## 2020-05-19 NOTE — PHYSICAL EXAM
[Restricted in physically strenuous activity but ambulatory and able to carry out work of a light or sedentary nature] : Status 1- Restricted in physically strenuous activity but ambulatory and able to carry out work of a light or sedentary nature, e.g., light house work, office work [Normal] : normal appearance, no rash, nodules, vesicles, ulcers, erythema [de-identified] : PIV in place  [de-identified] : Scarf and multiple layers on

## 2020-05-19 NOTE — ASSESSMENT
[FreeTextEntry1] : Ms. Manriquez is a 59 y/o postmenopausal F with history of stage IB endometrioid endometrial adenocarcinoma, grade 2 +LVSI, 17/20mm muscle invasion s/p RTLH/BSO, SLN, PPALND in 7/2018 followed by adjuvant vaginal brachytherapy completed in 11/2018, who was PHUONG until February 2020 when she was found to have recurrent metastatic disease, now biopsy-proven from new abdominal wall mass. \par \par 2nd Reaction to Taxol despite pre medications\par We will stop Taxol  \par Proceed to Carbo upon complete resolution of symptoms\par Plan to replace Taxol with Abraxane \par Plan reviewed with patient \par * Reaction to Taxol with C2 (Feeling hot, and complained of back and chest pain with flushing_Improved) \par Pt recovered completely and will complete Carboplatin today\par \par The patient is in agreement with the plan of care. The patient's questions and concerns were addressed in full. \par \par FOundation medicine sent

## 2020-05-19 NOTE — ASSESSMENT
[Palliative] : Goals of care discussed with patient: Palliative [FreeTextEntry1] : Ms. Manriquez is a 61 y/o postmenopausal F with history of stage IB endometrioid endometrial adenocarcinoma, grade 2 +LVSI, 17/20mm muscle invasion s/p RTLH/BSO, SLN, PPALND in 7/2018 followed by adjuvant vaginal brachytherapy completed in 11/2018, who was PHUONG until February 2020 when she was found to have recurrent metastatic disease, now biopsy-proven from new abdominal wall mass. \par \par S/p C2:  Carbo/Taxol \par -Feeling well, tolerating with Grade II Neuropapthy \par Will dose adjust Taxol per review with Dr. Calvert \par * Reaction to Taxol with C2 (Feeling hot, and complained of back and chest pain with flushing_Improved and completed with Benadryl/Pepcid/Solucortef)  Will have steroid/Benadryl and Pepcid IV with next cycle.  \par We discussed adding Miralax and Colace to senna for constipation. \par -Clobetasol for scalp irritation \par \par Plan: \par -plan for bloodwork prior to C3 \par Proceed to C3 \par \par The patient is in agreement with the plan of care. The patient's questions and concerns were addressed in full. \par \par

## 2020-05-19 NOTE — HISTORY OF PRESENT ILLNESS
[Disease: _____________________] : Disease: [unfilled] [M: ___] : M[unfilled] [AJCC Stage: ____] : AJCC Stage: [unfilled] [Cycle: ___] : Cycle: [unfilled] [de-identified] : Referred by Dr. Fuentes\par Telephone visit:\par Due to the COVID19 pandemic, this interview was conducted over the telephone, with the patient and her . I was not able to do a physical exam during this interview. 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 She is being referred to medical oncology for treatment considerations for recurrent metastatic disease.\par \par Ms. Manriquez initially presented to her GYN (Dr. Powell, 5/17/18) for postmenopausal bleeding that began shortly after Lap cholecystectomy (4/9/18). She underwent a Pelvic US that showed 10.8 x 6.7 x 6.1 cm uterus with fibroids. She underwent endometrial biopsy with Dr. English at [place] on 5/21/18. Pathology was significant for endometrial adenocarcinoma, endometroid type, FIGO grade 1 nuclear grade 2 with foci of squamous differentiation. \par \par MRI of the pelvis on 5/24/18 showed a large mass involving the lower uterine segment and body. There were no pathologically enlarged lymph nodes but there was a large heterogeneous lesion involving the left sacrum suspicious for osseous metastatic disease. She underwent biopsy of the left sacral lesion which was read as consistent with hemangioma to rule out metastatic disease.\par \par She subsequently underwent comprehensive surgical staging by Dr. Fuentes including RTLH, BSO, SLN, PPALND on 7/19/18. Pathology for significant for FIGO grade 1 nuclear grade 2 invasive adenocarcinoma with LVSI and 17/20 mm muscle invasion, stage IB. There was no cervical stromal invasion, lower uterine segment invasion, or uterine serosal involvement. All the sampled left pelvic, right pelvic and para-aortic nodes were free of cancer. \par \par Postoperatively she did well and received vaginal brachytherapy 2500 cGy in 5 fractions over 10 days completed 9/17/18 with Dr. Littlejohn of radiation oncology. \par \par She felt well without any concerning symptoms and was PHUONG under surveillance until she reported new left sided abdominal pain and a firmness. In retrospect, she thinks that she felt something different in her L abdomen/pelvis for the past 1-2 months. She followed up with Dr. Fuentes who palpated a 3cm left sided mass under robotic trochar incision suspicious for recurrence. \par \par CT CAP 2/15/19 demonstrated:\par New pulmonary nodule (two subcm nodules, new), external iliac lymphadenopathy, soft tissue intraperitoneal implants, and intramuscular enhancing nodules consistent with metastatic disease. \par \par PERITONEUM: Trace amount of pelvic fluid. There multiple new soft tissue masses, for instance \par adjacent to the descending colon along the left paracolic gutter measuring 4.6 x 3.1 x 2.2 cm and \par within the right lower quadrant measuring 3.6 x 3.4 x 1.8 cm. \par VESSELS: Within normal limits. \par RETROPERITONEUM/LYMPH NODES: Bilateral new enlarged external iliac lymph nodes (2:133) \par measuring up to 2.2 x 1.7 cm on the left \par ABDOMINAL WALL: Within the left rectus muscle there is an enhancing mass measuring 5.0 x 2.8 x \par 5.7 cm, with an additional nodule within the anterior subcutaneous tissues. There is an additional 2 \par cm mass abutting the inferior left rectus muscle. \par BONES: Within normal limits. \par \par She underwent US-guided abdominal wall mass biopsy on 3/6 by IR. Pathology review from the biopsy demonstrated metastatic adenocarcinoma, c/w metastasis from patient's known history of endometroid endometrial adenocarcinoma. \par \par Addendum\par Addendum issued to report results of additional immunostains\par P53:Wild type; Ki-67 is mildly elevated, WT1 is negative\par The cytomorphology in conjunction with the immunophenotype is consistent with metastases from a primary endometrioid endometrial adenocarcinoma.\par \par She has been referred to medical oncology to discuss systemic treatment for her recurrent endometrial cancer. She feels very well overall, although she continues to feel the intermittent L sided abdominal discomfort and pulling sensation. She is working full time and lives with her . She has a daughter. She has a good appetite and denies any weight changes.  [de-identified] : ENdometrioid Endometrial Adenocarcinoma   [FreeTextEntry1] : Carbo/Taxol  5/8/20 \par 1. 3/30/20\par C2 4/17/20\par C3\par C4 \par C5\par C6  [de-identified] : The patient is here C3 Chemotherapy \par I was contacted by TX room RN for Taxol reaction \par (Within the first 10 minutes patient became flushed and chest discomfort) \par She received supportive medication and symptoms quickly resolved\par \par

## 2020-05-19 NOTE — HISTORY OF PRESENT ILLNESS
[Disease: _____________________] : Disease: [unfilled] [M: ___] : M[unfilled] [AJCC Stage: ____] : AJCC Stage: [unfilled] [Home] : at home, [unfilled] , at the time of the visit. [Medical Office: ([facility])___] : at ~his/her~ medical office located in [place] [Patient] : the patient [Self] : self [Cycle: ___] : Cycle: [unfilled] [de-identified] : Referred by Dr. Fuentes\par Telephone visit:\par Due to the COVID19 pandemic, this interview was conducted over the telephone, with the patient and her . I was not able to do a physical exam during this interview. 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 She is being referred to medical oncology for treatment considerations for recurrent metastatic disease.\par \par Ms. Manriquez initially presented to her GYN (Dr. Powell, 5/17/18) for postmenopausal bleeding that began shortly after Lap cholecystectomy (4/9/18). She underwent a Pelvic US that showed 10.8 x 6.7 x 6.1 cm uterus with fibroids. She underwent endometrial biopsy with Dr. English at [place] on 5/21/18. Pathology was significant for endometrial adenocarcinoma, endometroid type, FIGO grade 1 nuclear grade 2 with foci of squamous differentiation. \par \par MRI of the pelvis on 5/24/18 showed a large mass involving the lower uterine segment and body. There were no pathologically enlarged lymph nodes but there was a large heterogeneous lesion involving the left sacrum suspicious for osseous metastatic disease. She underwent biopsy of the left sacral lesion which was read as consistent with hemangioma to rule out metastatic disease.\par \par She subsequently underwent comprehensive surgical staging by Dr. Fuentes including RTLH, BSO, SLN, PPALND on 7/19/18. Pathology for significant for FIGO grade 1 nuclear grade 2 invasive adenocarcinoma with LVSI and 17/20 mm muscle invasion, stage IB. There was no cervical stromal invasion, lower uterine segment invasion, or uterine serosal involvement. All the sampled left pelvic, right pelvic and para-aortic nodes were free of cancer. \par \par Postoperatively she did well and received vaginal brachytherapy 2500 cGy in 5 fractions over 10 days completed 9/17/18 with Dr. Littlejohn of radiation oncology. \par \par She felt well without any concerning symptoms and was PHUONG under surveillance until she reported new left sided abdominal pain and a firmness. In retrospect, she thinks that she felt something different in her L abdomen/pelvis for the past 1-2 months. She followed up with Dr. Fuentes who palpated a 3cm left sided mass under robotic trochar incision suspicious for recurrence. \par \par CT CAP 2/15/19 demonstrated:\par New pulmonary nodule (two subcm nodules, new), external iliac lymphadenopathy, soft tissue intraperitoneal implants, and intramuscular enhancing nodules consistent with metastatic disease. \par \par PERITONEUM: Trace amount of pelvic fluid. There multiple new soft tissue masses, for instance \par adjacent to the descending colon along the left paracolic gutter measuring 4.6 x 3.1 x 2.2 cm and \par within the right lower quadrant measuring 3.6 x 3.4 x 1.8 cm. \par VESSELS: Within normal limits. \par RETROPERITONEUM/LYMPH NODES: Bilateral new enlarged external iliac lymph nodes (2:133) \par measuring up to 2.2 x 1.7 cm on the left \par ABDOMINAL WALL: Within the left rectus muscle there is an enhancing mass measuring 5.0 x 2.8 x \par 5.7 cm, with an additional nodule within the anterior subcutaneous tissues. There is an additional 2 \par cm mass abutting the inferior left rectus muscle. \par BONES: Within normal limits. \par \par She underwent US-guided abdominal wall mass biopsy on 3/6 by IR. Pathology review from the biopsy demonstrated metastatic adenocarcinoma, c/w metastasis from patient's known history of endometroid endometrial adenocarcinoma. \par \par Addendum\par Addendum issued to report results of additional immunostains\par P53:Wild type; Ki-67 is mildly elevated, WT1 is negative\par The cytomorphology in conjunction with the immunophenotype is consistent with metastases from a primary endometrioid endometrial adenocarcinoma.\par \par She has been referred to medical oncology to discuss systemic treatment for her recurrent endometrial cancer. She feels very well overall, although she continues to feel the intermittent L sided abdominal discomfort and pulling sensation. She is working full time and lives with her . She has a daughter. She has a good appetite and denies any weight changes.  [de-identified] : C [de-identified] : ENdometrioid Endometrial Adenocarcinoma   [FreeTextEntry1] : Carbo/Taxol \par 1. 3/30/20\par C2 4/17/20\par C3\par C4 \par C5\par C6  [de-identified] : The patient is here for follow up after C2 carbo/Taxol on 4/17/20\par She notes fatigue for the first 48 hours\par Dry cough with allergies which is common place for her. 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  She notes some adjustments needed when typing.  \par She is eating a very health well balanced diet and hydrating with water frequently\par Voiding well without hematuria, dysuria \par Improvement in abdominal discomfort.\par Denies bony pain,minimal arthralgias \par Intermittent constipation  \par Intermittent breaks and naps \par \par

## 2020-05-19 NOTE — REVIEW OF SYSTEMS
[Negative] : Allergic/Immunologic [FreeTextEntry7] : Decreased abdominal discomfort  [de-identified] : mild constipation

## 2020-05-19 NOTE — REVIEW OF SYSTEMS
[Negative] : Cardiovascular [FreeTextEntry7] : Decreased abdominal discomfort  [de-identified] : mild constipation

## 2020-05-20 ENCOUNTER — APPOINTMENT (OUTPATIENT)
Dept: HEMATOLOGY ONCOLOGY | Facility: CLINIC | Age: 60
End: 2020-05-20

## 2020-05-20 ENCOUNTER — APPOINTMENT (OUTPATIENT)
Dept: HEMATOLOGY ONCOLOGY | Facility: CLINIC | Age: 60
End: 2020-05-20
Payer: COMMERCIAL

## 2020-05-20 PROCEDURE — 99214 OFFICE O/P EST MOD 30 MIN: CPT | Mod: 95

## 2020-05-20 NOTE — HISTORY OF PRESENT ILLNESS
[de-identified] : Referred by Dr. Fuentes\par Telephone visit:\par Due to the COVID19 pandemic, this interview was conducted over the telephone, with the patient and her . I was not able to do a physical exam during this interview. 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 She is being referred to medical oncology for treatment considerations for recurrent metastatic disease.\par \par Ms. Manriquez initially presented to her GYN (Dr. Powell, 5/17/18) for postmenopausal bleeding that began shortly after Lap cholecystectomy (4/9/18). She underwent a Pelvic US that showed 10.8 x 6.7 x 6.1 cm uterus with fibroids. She underwent endometrial biopsy with Dr. English at [facility] on 5/21/18. Pathology was significant for endometrial adenocarcinoma, endometroid type, FIGO grade 1 nuclear grade 2 with foci of squamous differentiation. \par \par MRI of the pelvis on 5/24/18 showed a large mass involving the lower uterine segment and body. There were no pathologically enlarged lymph nodes but there was a large heterogeneous lesion involving the left sacrum suspicious for osseous metastatic disease. She underwent biopsy of the left sacral lesion which was read as consistent with hemangioma to rule out metastatic disease.\par \par She subsequently underwent comprehensive surgical staging by Dr. Fuentes including RTLH, BSO, SLN, PPALND on 7/19/18. Pathology for significant for FIGO grade 1 nuclear grade 2 invasive adenocarcinoma with LVSI and 17/20 mm muscle invasion, stage IB. There was no cervical stromal invasion, lower uterine segment invasion, or uterine serosal involvement. All the sampled left pelvic, right pelvic and para-aortic nodes were free of cancer. \par \par Postoperatively she did well and received vaginal brachytherapy 2500 cGy in 5 fractions over 10 days completed 9/17/18 with Dr. Littlejohn of radiation oncology. \par \par She felt well without any concerning symptoms and was PHUONG under surveillance until she reported new left sided abdominal pain and a firmness. In retrospect, she thinks that she felt something different in her L abdomen/pelvis for the past 1-2 months. She followed up with Dr. Fuentes who palpated a 3cm left sided mass under robotic trochar incision suspicious for recurrence. \par \par CT CAP 2/15/19 demonstrated:\par New pulmonary nodule (two subcm nodules, new), external iliac lymphadenopathy, soft tissue intraperitoneal implants, and intramuscular enhancing nodules consistent with metastatic disease. \par \par PERITONEUM: Trace amount of pelvic fluid. There multiple new soft tissue masses, for instance \par adjacent to the descending colon along the left paracolic gutter measuring 4.6 x 3.1 x 2.2 cm and \par within the right lower quadrant measuring 3.6 x 3.4 x 1.8 cm. \par VESSELS: Within normal limits. \par RETROPERITONEUM/LYMPH NODES: Bilateral new enlarged external iliac lymph nodes (2:133) \par measuring up to 2.2 x 1.7 cm on the left \par ABDOMINAL WALL: Within the left rectus muscle there is an enhancing mass measuring 5.0 x 2.8 x \par 5.7 cm, with an additional nodule within the anterior subcutaneous tissues. There is an additional 2 \par cm mass abutting the inferior left rectus muscle. \par BONES: Within normal limits. \par \par She underwent US-guided abdominal wall mass biopsy on 3/6 by IR. Pathology review from the biopsy demonstrated metastatic adenocarcinoma, c/w metastasis from patient's known history of endometroid endometrial adenocarcinoma. \par \par Addendum\par Addendum issued to report results of additional immunostains\par P53:Wild type; Ki-67 is mildly elevated, WT1 is negative\par The cytomorphology in conjunction with the immunophenotype is consistent with metastases from a primary endometrioid endometrial adenocarcinoma.\par \par She has been referred to medical oncology to discuss systemic treatment for her recurrent endometrial cancer. She feels very well overall, although she continues to feel the intermittent L sided abdominal discomfort and pulling sensation. She is working full time and lives with her . She has a daughter. She has a good appetite and denies any weight changes.  [de-identified] : The patient is here for follow up after C3 carbo/taxol. She feels very well overall since last treatment, with good appetite and she has been staying active at home, and continues to work from home. She did have a reaction with facial flushing, chest discomfort and mild SOB during the taxol infusion, which resolved quickly after steroids and supportive meds administered. She has not had any further allergic symptoms. She has grade 1 fatigue during the week following chemotherapy, and continues to have grade 1 neuropathy involving her fingers and toes b/l, occasional tingling sensation which she thinks is better with reduced dosing of taxol for C3. She reports occasional R sided abdominal discomfort that comes and goes, and sometimes feels that the tumors are moving inside the abdominal cavity. She also notes a small area on her R anterior thigh that feels itchy, but there is no rash or swelling or erythema that she can see. It feels itchy "from the inside." Otherwise she feels well and denies fevers, chills, nausea, vomiting, abdominal pain, urinary symptoms, vaginal bleeding/discharge, CP or SOB.  [Medical Office: ([facility])___] : at ~his/her~ medical office located in [place] [Home] : at home, [unfilled] , at the time of the visit. [Patient] : the patient [Spouse] : spouse [Self] : self

## 2020-05-20 NOTE — PHYSICAL EXAM
[Fully active, able to carry on all pre-disease performance without restriction] : Status 0 - Fully active, able to carry on all pre-disease performance without restriction [de-identified] : Normal respiratory effort and rate [Normal] : grossly intact

## 2020-05-20 NOTE — REVIEW OF SYSTEMS
[Negative] : Musculoskeletal [FreeTextEntry2] : grade 1 neuropathy of fingers and toes b/l, improved after this cycle  [Neuropathy] : neuropathy [de-identified] : mild constipation, occasional R sided/umbilical abdominal discomfort

## 2020-05-20 NOTE — PAST MEDICAL HISTORY
[Postmenopausal] : The patient is postmenopausal [Menarche Age ____] : age at menarche was [unfilled] [Menopause Age____] : age at menopause was [unfilled] [Total Preg ___] : G[unfilled] [Definite ___ (Date)] : the last menstrual period was [unfilled] [Live Births ___] : P[unfilled]  [Full Term ___] : Full Term: [unfilled] [Living ___] : Living: [unfilled] [Abortions ___] : Abortions:[unfilled]

## 2020-05-20 NOTE — ASSESSMENT
[FreeTextEntry1] : Ms. Manriquez is a 61 y/o postmenopausal F with history of stage IB endometrioid endometrial adenocarcinoma, grade 2 +LVSI, 17/20mm muscle invasion s/p RTLH/BSO, SLN, PPALND in 7/2018 followed by adjuvant vaginal brachytherapy completed in 11/2018, who was PHUONG until February 2020 when she was found to have recurrent metastatic disease, now biopsy-proven from new abdominal wall mass. \par \par S/p C3 carbo/taxol on 5/8/20. She had a taxol reaction that included facial flushing, chest discomfort and SOB. All symptoms resolved quickly with cortef and supportive meds. \par \par Plan:\par -Will stop taxol and switch to Abraxane for C4 given reaction\par - I reviewed the potential toxicities and side effects of abraxane and the reasons for the switch. \par - Patient verbally consents to abraxane chemotherapy to be given with carbo q3w\par -Will obtain CT scans at this juncture to assess response to therapy\par -trial of claritin and topical benadryl cream for R thigh pruritis, no rash/swelling noted; may be chemo-associated. \par \par The patient is in agreement with the plan of care. The patient and her 's questions and concerns were addressed in full. \par \par

## 2020-05-24 ENCOUNTER — OUTPATIENT (OUTPATIENT)
Dept: OUTPATIENT SERVICES | Facility: HOSPITAL | Age: 60
LOS: 1 days | Discharge: HOME | End: 2020-05-24

## 2020-05-24 DIAGNOSIS — Z98.891 HISTORY OF UTERINE SCAR FROM PREVIOUS SURGERY: Chronic | ICD-10-CM

## 2020-05-24 DIAGNOSIS — Z90.49 ACQUIRED ABSENCE OF OTHER SPECIFIED PARTS OF DIGESTIVE TRACT: Chronic | ICD-10-CM

## 2020-05-24 DIAGNOSIS — Z98.890 OTHER SPECIFIED POSTPROCEDURAL STATES: Chronic | ICD-10-CM

## 2020-05-24 DIAGNOSIS — C54.1 MALIGNANT NEOPLASM OF ENDOMETRIUM: ICD-10-CM

## 2020-05-26 ENCOUNTER — RESULT REVIEW (OUTPATIENT)
Age: 60
End: 2020-05-26

## 2020-05-26 ENCOUNTER — APPOINTMENT (OUTPATIENT)
Dept: CT IMAGING | Facility: IMAGING CENTER | Age: 60
End: 2020-05-26
Payer: COMMERCIAL

## 2020-05-26 ENCOUNTER — OUTPATIENT (OUTPATIENT)
Dept: OUTPATIENT SERVICES | Facility: HOSPITAL | Age: 60
LOS: 1 days | End: 2020-05-26
Payer: COMMERCIAL

## 2020-05-26 DIAGNOSIS — Z98.891 HISTORY OF UTERINE SCAR FROM PREVIOUS SURGERY: Chronic | ICD-10-CM

## 2020-05-26 DIAGNOSIS — Z98.890 OTHER SPECIFIED POSTPROCEDURAL STATES: Chronic | ICD-10-CM

## 2020-05-26 DIAGNOSIS — Z90.49 ACQUIRED ABSENCE OF OTHER SPECIFIED PARTS OF DIGESTIVE TRACT: Chronic | ICD-10-CM

## 2020-05-26 DIAGNOSIS — C54.1 MALIGNANT NEOPLASM OF ENDOMETRIUM: ICD-10-CM

## 2020-05-26 PROCEDURE — 74177 CT ABD & PELVIS W/CONTRAST: CPT | Mod: 26

## 2020-05-26 PROCEDURE — 74177 CT ABD & PELVIS W/CONTRAST: CPT

## 2020-05-29 ENCOUNTER — RESULT REVIEW (OUTPATIENT)
Age: 60
End: 2020-05-29

## 2020-05-29 ENCOUNTER — APPOINTMENT (OUTPATIENT)
Dept: INFUSION THERAPY | Facility: HOSPITAL | Age: 60
End: 2020-05-29

## 2020-05-29 LAB
BASOPHILS # BLD AUTO: 0 K/UL — SIGNIFICANT CHANGE UP (ref 0–0.2)
BASOPHILS NFR BLD AUTO: 0 % — SIGNIFICANT CHANGE UP (ref 0–2)
EOSINOPHIL # BLD AUTO: 0 K/UL — SIGNIFICANT CHANGE UP (ref 0–0.5)
EOSINOPHIL NFR BLD AUTO: 0 % — SIGNIFICANT CHANGE UP (ref 0–6)
HCT VFR BLD CALC: 30 % — LOW (ref 34.5–45)
HGB BLD-MCNC: 10 G/DL — LOW (ref 11.5–15.5)
LYMPHOCYTES # BLD AUTO: 0.98 K/UL — LOW (ref 1–3.3)
LYMPHOCYTES # BLD AUTO: 27 % — SIGNIFICANT CHANGE UP (ref 13–44)
MCHC RBC-ENTMCNC: 27.2 PG — SIGNIFICANT CHANGE UP (ref 27–34)
MCHC RBC-ENTMCNC: 33.3 GM/DL — SIGNIFICANT CHANGE UP (ref 32–36)
MCV RBC AUTO: 81.7 FL — SIGNIFICANT CHANGE UP (ref 80–100)
MONOCYTES # BLD AUTO: 0.33 K/UL — SIGNIFICANT CHANGE UP (ref 0–0.9)
MONOCYTES NFR BLD AUTO: 9 % — SIGNIFICANT CHANGE UP (ref 2–14)
NEUTROPHILS # BLD AUTO: 2.33 K/UL — SIGNIFICANT CHANGE UP (ref 1.8–7.4)
NEUTROPHILS NFR BLD AUTO: 64 % — SIGNIFICANT CHANGE UP (ref 43–77)
NRBC # BLD: 0 /100 — SIGNIFICANT CHANGE UP (ref 0–0)
NRBC # BLD: SIGNIFICANT CHANGE UP /100 WBCS (ref 0–0)
PLAT MORPH BLD: NORMAL — SIGNIFICANT CHANGE UP
PLATELET # BLD AUTO: 282 K/UL — SIGNIFICANT CHANGE UP (ref 150–400)
RBC # BLD: 3.67 M/UL — LOW (ref 3.8–5.2)
RBC # FLD: 17.1 % — HIGH (ref 10.3–14.5)
RBC BLD AUTO: SIGNIFICANT CHANGE UP
WBC # BLD: 3.64 K/UL — LOW (ref 3.8–10.5)
WBC # FLD AUTO: 3.64 K/UL — LOW (ref 3.8–10.5)

## 2020-06-01 DIAGNOSIS — Z51.11 ENCOUNTER FOR ANTINEOPLASTIC CHEMOTHERAPY: ICD-10-CM

## 2020-06-01 DIAGNOSIS — R11.2 NAUSEA WITH VOMITING, UNSPECIFIED: ICD-10-CM

## 2020-06-03 ENCOUNTER — APPOINTMENT (OUTPATIENT)
Dept: CT IMAGING | Facility: IMAGING CENTER | Age: 60
End: 2020-06-03
Payer: COMMERCIAL

## 2020-06-03 ENCOUNTER — OUTPATIENT (OUTPATIENT)
Dept: OUTPATIENT SERVICES | Facility: HOSPITAL | Age: 60
LOS: 1 days | End: 2020-06-03
Payer: COMMERCIAL

## 2020-06-03 DIAGNOSIS — C54.1 MALIGNANT NEOPLASM OF ENDOMETRIUM: ICD-10-CM

## 2020-06-03 DIAGNOSIS — Z90.49 ACQUIRED ABSENCE OF OTHER SPECIFIED PARTS OF DIGESTIVE TRACT: Chronic | ICD-10-CM

## 2020-06-03 DIAGNOSIS — Z98.891 HISTORY OF UTERINE SCAR FROM PREVIOUS SURGERY: Chronic | ICD-10-CM

## 2020-06-03 DIAGNOSIS — Z98.890 OTHER SPECIFIED POSTPROCEDURAL STATES: Chronic | ICD-10-CM

## 2020-06-03 PROCEDURE — 71260 CT THORAX DX C+: CPT

## 2020-06-03 PROCEDURE — 71260 CT THORAX DX C+: CPT | Mod: 26

## 2020-06-05 ENCOUNTER — APPOINTMENT (OUTPATIENT)
Dept: HEMATOLOGY ONCOLOGY | Facility: CLINIC | Age: 60
End: 2020-06-05

## 2020-06-08 ENCOUNTER — APPOINTMENT (OUTPATIENT)
Dept: HEMATOLOGY ONCOLOGY | Facility: CLINIC | Age: 60
End: 2020-06-08
Payer: COMMERCIAL

## 2020-06-08 PROCEDURE — 99214 OFFICE O/P EST MOD 30 MIN: CPT | Mod: 95

## 2020-06-12 ENCOUNTER — LABORATORY RESULT (OUTPATIENT)
Age: 60
End: 2020-06-12

## 2020-06-12 ENCOUNTER — APPOINTMENT (OUTPATIENT)
Dept: HEMATOLOGY ONCOLOGY | Facility: CLINIC | Age: 60
End: 2020-06-12

## 2020-06-12 LAB
ALBUMIN SERPL ELPH-MCNC: 4.6 G/DL
ALP BLD-CCNC: 101 U/L
ALT SERPL-CCNC: 12 U/L
ANION GAP SERPL CALC-SCNC: 14 MMOL/L
AST SERPL-CCNC: 13 U/L
BASOPHILS # BLD AUTO: 0.01 K/UL
BASOPHILS NFR BLD AUTO: 0.3 %
BILIRUB SERPL-MCNC: 0.2 MG/DL
BUN SERPL-MCNC: 16 MG/DL
CALCIUM SERPL-MCNC: 9.2 MG/DL
CHLORIDE SERPL-SCNC: 103 MMOL/L
CO2 SERPL-SCNC: 25 MMOL/L
CREAT SERPL-MCNC: 0.81 MG/DL
EOSINOPHIL # BLD AUTO: 0.01 K/UL
EOSINOPHIL NFR BLD AUTO: 0.3 %
GLUCOSE SERPL-MCNC: 104 MG/DL
HCT VFR BLD CALC: 26.2 %
HGB BLD-MCNC: 8.6 G/DL
IMM GRANULOCYTES NFR BLD AUTO: 1.3 %
LYMPHOCYTES # BLD AUTO: 0.96 K/UL
LYMPHOCYTES NFR BLD AUTO: 31.9 %
MAGNESIUM SERPL-MCNC: 1.8 MG/DL
MAN DIFF?: NORMAL
MCHC RBC-ENTMCNC: 27.3 PG
MCHC RBC-ENTMCNC: 32.8 GM/DL
MCV RBC AUTO: 83.2 FL
MONOCYTES # BLD AUTO: 0.47 K/UL
MONOCYTES NFR BLD AUTO: 15.6 %
NEUTROPHILS # BLD AUTO: 1.52 K/UL
NEUTROPHILS NFR BLD AUTO: 50.6 %
PLATELET # BLD AUTO: 91 K/UL
POTASSIUM SERPL-SCNC: 3.9 MMOL/L
PROT SERPL-MCNC: 6.5 G/DL
RBC # BLD: 3.15 M/UL
RBC # FLD: 16.5 %
SODIUM SERPL-SCNC: 142 MMOL/L
WBC # FLD AUTO: 3.01 K/UL

## 2020-06-19 ENCOUNTER — APPOINTMENT (OUTPATIENT)
Dept: INFUSION THERAPY | Facility: HOSPITAL | Age: 60
End: 2020-06-19

## 2020-06-19 ENCOUNTER — RESULT REVIEW (OUTPATIENT)
Age: 60
End: 2020-06-19

## 2020-06-19 LAB
BASOPHILS # BLD AUTO: 0.01 K/UL — SIGNIFICANT CHANGE UP (ref 0–0.2)
BASOPHILS NFR BLD AUTO: 0.2 % — SIGNIFICANT CHANGE UP (ref 0–2)
EOSINOPHIL # BLD AUTO: 0.01 K/UL — SIGNIFICANT CHANGE UP (ref 0–0.5)
EOSINOPHIL NFR BLD AUTO: 0.2 % — SIGNIFICANT CHANGE UP (ref 0–6)
HCT VFR BLD CALC: 25.8 % — LOW (ref 34.5–45)
HGB BLD-MCNC: 8.8 G/DL — LOW (ref 11.5–15.5)
IMM GRANULOCYTES NFR BLD AUTO: 0.8 % — SIGNIFICANT CHANGE UP (ref 0–1.5)
LYMPHOCYTES # BLD AUTO: 1.32 K/UL — SIGNIFICANT CHANGE UP (ref 1–3.3)
LYMPHOCYTES # BLD AUTO: 27.6 % — SIGNIFICANT CHANGE UP (ref 13–44)
MCHC RBC-ENTMCNC: 27.8 PG — SIGNIFICANT CHANGE UP (ref 27–34)
MCHC RBC-ENTMCNC: 34.1 GM/DL — SIGNIFICANT CHANGE UP (ref 32–36)
MCV RBC AUTO: 81.4 FL — SIGNIFICANT CHANGE UP (ref 80–100)
MONOCYTES # BLD AUTO: 0.45 K/UL — SIGNIFICANT CHANGE UP (ref 0–0.9)
MONOCYTES NFR BLD AUTO: 9.4 % — SIGNIFICANT CHANGE UP (ref 2–14)
NEUTROPHILS # BLD AUTO: 2.96 K/UL — SIGNIFICANT CHANGE UP (ref 1.8–7.4)
NEUTROPHILS NFR BLD AUTO: 61.8 % — SIGNIFICANT CHANGE UP (ref 43–77)
NRBC # BLD: 0 /100 WBCS — SIGNIFICANT CHANGE UP (ref 0–0)
PLATELET # BLD AUTO: 156 K/UL — SIGNIFICANT CHANGE UP (ref 150–400)
RBC # BLD: 3.17 M/UL — LOW (ref 3.8–5.2)
RBC # FLD: 17.3 % — HIGH (ref 10.3–14.5)
WBC # BLD: 4.79 K/UL — SIGNIFICANT CHANGE UP (ref 3.8–10.5)
WBC # FLD AUTO: 4.79 K/UL — SIGNIFICANT CHANGE UP (ref 3.8–10.5)

## 2020-06-23 ENCOUNTER — OUTPATIENT (OUTPATIENT)
Dept: OUTPATIENT SERVICES | Facility: HOSPITAL | Age: 60
LOS: 1 days | Discharge: HOME | End: 2020-06-23

## 2020-06-23 DIAGNOSIS — Z98.891 HISTORY OF UTERINE SCAR FROM PREVIOUS SURGERY: Chronic | ICD-10-CM

## 2020-06-23 DIAGNOSIS — Z98.890 OTHER SPECIFIED POSTPROCEDURAL STATES: Chronic | ICD-10-CM

## 2020-06-23 DIAGNOSIS — Z90.49 ACQUIRED ABSENCE OF OTHER SPECIFIED PARTS OF DIGESTIVE TRACT: Chronic | ICD-10-CM

## 2020-06-23 DIAGNOSIS — C54.1 MALIGNANT NEOPLASM OF ENDOMETRIUM: ICD-10-CM

## 2020-06-27 NOTE — PHYSICAL EXAM
[Fully active, able to carry on all pre-disease performance without restriction] : Status 0 - Fully active, able to carry on all pre-disease performance without restriction [Normal] : affect appropriate [de-identified] : Normal respiratory effort and rate

## 2020-06-27 NOTE — PAST MEDICAL HISTORY
[Postmenopausal] : The patient is postmenopausal [Menopause Age____] : age at menopause was [unfilled] [Menarche Age ____] : age at menarche was [unfilled] [Definite ___ (Date)] : the last menstrual period was [unfilled] [Total Preg ___] : G[unfilled] [Live Births ___] : P[unfilled]  [Abortions ___] : Abortions:[unfilled] [Full Term ___] : Full Term: [unfilled] [Living ___] : Living: [unfilled]

## 2020-06-27 NOTE — HISTORY OF PRESENT ILLNESS
[de-identified] : The patient is here for follow up after C4 carbo/taxol. \par \par She feels well overall since last treatment, with good appetite and energy. She has been staying active at home, and continues to work from home. She has grade 1 fatigue during the week following chemotherapy that improves after, and continues to have grade 1 neuropathy involving her fingers and toes b/l, occasional tingling sensation which she thinks is better with reduced dosing of taxol. She continues to have intermittent left sided abdominal discomfort, and sometimes feels that the tumors are "moving around inside" the abdominal cavity. She is alternating Tylenol and tramadol with usually complete resolution of pain. Having regular BMs. Otherwise she feels well and denies fevers, chills, nausea, vomiting, abdominal pain, urinary symptoms, vaginal bleeding/discharge, CP or SOB.  [de-identified] : Referred by Dr. Fuentes\par Telephone visit:\par Due to the COVID19 pandemic, this interview was conducted over the telephone, with the patient and her . I was not able to do a physical exam during this interview. 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 She is being referred to medical oncology for treatment considerations for recurrent metastatic disease.\par \par Ms. Manriquez initially presented to her GYN (Dr. Powell, 5/17/18) for postmenopausal bleeding that began shortly after Lap cholecystectomy (4/9/18). She underwent a Pelvic US that showed 10.8 x 6.7 x 6.1 cm uterus with fibroids. She underwent endometrial biopsy with Dr. English at [place] on 5/21/18. Pathology was significant for endometrial adenocarcinoma, endometroid type, FIGO grade 1 nuclear grade 2 with foci of squamous differentiation. \par \par MRI of the pelvis on 5/24/18 showed a large mass involving the lower uterine segment and body. There were no pathologically enlarged lymph nodes but there was a large heterogeneous lesion involving the left sacrum suspicious for osseous metastatic disease. She underwent biopsy of the left sacral lesion which was read as consistent with hemangioma to rule out metastatic disease.\par \par She subsequently underwent comprehensive surgical staging by Dr. Fuentes including RTLH, BSO, SLN, PPALND on 7/19/18. Pathology for significant for FIGO grade 1 nuclear grade 2 invasive adenocarcinoma with LVSI and 17/20 mm muscle invasion, stage IB. There was no cervical stromal invasion, lower uterine segment invasion, or uterine serosal involvement. All the sampled left pelvic, right pelvic and para-aortic nodes were free of cancer. \par \par Postoperatively she did well and received vaginal brachytherapy 2500 cGy in 5 fractions over 10 days completed 9/17/18 with Dr. Littlejohn of radiation oncology. \par \par She felt well without any concerning symptoms and was PHUONG under surveillance until she reported new left sided abdominal pain and a firmness. In retrospect, she thinks that she felt something different in her L abdomen/pelvis for the past 1-2 months. She followed up with Dr. Fuentes who palpated a 3cm left sided mass under robotic trochar incision suspicious for recurrence. \par \par CT CAP 2/15/19 demonstrated:\par New pulmonary nodule (two subcm nodules, new), external iliac lymphadenopathy, soft tissue intraperitoneal implants, and intramuscular enhancing nodules consistent with metastatic disease. \par \par PERITONEUM: Trace amount of pelvic fluid. There multiple new soft tissue masses, for instance \par adjacent to the descending colon along the left paracolic gutter measuring 4.6 x 3.1 x 2.2 cm and \par within the right lower quadrant measuring 3.6 x 3.4 x 1.8 cm. \par VESSELS: Within normal limits. \par RETROPERITONEUM/LYMPH NODES: Bilateral new enlarged external iliac lymph nodes (2:133) \par measuring up to 2.2 x 1.7 cm on the left \par ABDOMINAL WALL: Within the left rectus muscle there is an enhancing mass measuring 5.0 x 2.8 x \par 5.7 cm, with an additional nodule within the anterior subcutaneous tissues. There is an additional 2 \par cm mass abutting the inferior left rectus muscle. \par BONES: Within normal limits. \par \par She underwent US-guided abdominal wall mass biopsy on 3/6 by IR. Pathology review from the biopsy demonstrated metastatic adenocarcinoma, c/w metastasis from patient's known history of endometroid endometrial adenocarcinoma. \par \par Addendum\par Addendum issued to report results of additional immunostains\par P53:Wild type; Ki-67 is mildly elevated, WT1 is negative\par The cytomorphology in conjunction with the immunophenotype is consistent with metastases from a primary endometrioid endometrial adenocarcinoma.\par \par She has been referred to medical oncology to discuss systemic treatment for her recurrent endometrial cancer. She feels very well overall, although she continues to feel the intermittent L sided abdominal discomfort and pulling sensation. She is working full time and lives with her . She has a daughter. She has a good appetite and denies any weight changes.  [Medical Office: ([facility])___] : at ~his/her~ medical office located in [place] [Spouse] : spouse [Home] : at home, [unfilled] , at the time of the visit. [Patient] : the patient [Self] : self

## 2020-06-27 NOTE — RESULTS/DATA
[FreeTextEntry1] : CT a/p 5/26/20:\par \par FINDINGS:\par LOWER CHEST: Within normal limits.\par \par LIVER: Within normal limits.\par BILE DUCTS: Normal caliber.\par GALLBLADDER: Cholecystectomy.\par SPLEEN: A 1.3 cm hypervascular splenic focus possibly a hamartoma or hemangioma without change.\par PANCREAS: Within normal limits.\par ADRENALS: Within normal limits.\par KIDNEYS/URETERS: Within normal limits.\par \par BLADDER: Within normal limits.\par REPRODUCTIVE ORGANS: Hysterectomy.\par \par BOWEL: No bowel obstruction. \par PERITONEUM: No ascites. Extensive peritoneal disease with reference lesions as follows:\par \par Left lower quadrant/paracolic gutter (2:82) 3.4 x 2.2 cm, previously 3.2 x 2.0 cm remeasured at prior imaging.\par \par Right lower quadrant (2:90) 3.0 x 2.1 cm, previously 2.4 x 2.0 cm remeasured at prior imaging.\par \par VESSELS: Within normal limits.\par RETROPERITONEUM/LYMPH NODES: Bilateral external iliac and obturator adenopathy with a reference left external iliac node (2:93) 2.6 x 1.6 cm, previously 2.2 x 1.7 cm.  \par ABDOMINAL WALL: Abdominal wall mass with the largest in the left rectus musculature (2:65) 5.0 x 3.0 cm, previously 5.0 x 2.8 cm.\par BONES: Degenerative changes.\par \par IMPRESSION: \par Peritoneal, retroperitoneal and abdominal wall disease without significant change from prior imaging 2/15/2020\par \par CT chest 6/3/20:\par \par IMPRESSION: \par Unchanged 4 mm right middle lobe nodule along minor fissure \par \par \par

## 2020-06-27 NOTE — OB HISTORY
[Total Preg ___] : : [unfilled] [Abortions ___] : [unfilled] (abortions) [Living ___] : [unfilled] (living) [AB Induced ___] : [unfilled] elective (s) [ ___] : [unfilled]  section delivery(s) [Menarche Age ____] : age at menarche was [unfilled] [Menopause  Age ____] : menopause occurred at age [unfilled]

## 2020-06-29 ENCOUNTER — APPOINTMENT (OUTPATIENT)
Dept: HEMATOLOGY ONCOLOGY | Facility: CLINIC | Age: 60
End: 2020-06-29
Payer: COMMERCIAL

## 2020-06-29 PROCEDURE — 99214 OFFICE O/P EST MOD 30 MIN: CPT | Mod: 95

## 2020-06-29 NOTE — PAST MEDICAL HISTORY
[Postmenopausal] : The patient is postmenopausal [Menopause Age____] : age at menopause was [unfilled] [Total Preg ___] : G[unfilled] [Live Births ___] : P[unfilled]  [Abortions ___] : Abortions:[unfilled] [Full Term ___] : Full Term: [unfilled] [Living ___] : Living: [unfilled]

## 2020-07-02 ENCOUNTER — APPOINTMENT (OUTPATIENT)
Dept: HEMATOLOGY ONCOLOGY | Facility: CLINIC | Age: 60
End: 2020-07-02

## 2020-07-07 ENCOUNTER — LABORATORY RESULT (OUTPATIENT)
Age: 60
End: 2020-07-07

## 2020-07-07 ENCOUNTER — APPOINTMENT (OUTPATIENT)
Dept: INFUSION THERAPY | Facility: HOSPITAL | Age: 60
End: 2020-07-07

## 2020-07-08 ENCOUNTER — APPOINTMENT (OUTPATIENT)
Dept: HEMATOLOGY ONCOLOGY | Facility: CLINIC | Age: 60
End: 2020-07-08

## 2020-07-08 ENCOUNTER — OUTPATIENT (OUTPATIENT)
Dept: OUTPATIENT SERVICES | Facility: HOSPITAL | Age: 60
LOS: 1 days | End: 2020-07-08
Payer: COMMERCIAL

## 2020-07-08 ENCOUNTER — RESULT REVIEW (OUTPATIENT)
Age: 60
End: 2020-07-08

## 2020-07-08 DIAGNOSIS — C54.1 MALIGNANT NEOPLASM OF ENDOMETRIUM: ICD-10-CM

## 2020-07-08 DIAGNOSIS — Z90.49 ACQUIRED ABSENCE OF OTHER SPECIFIED PARTS OF DIGESTIVE TRACT: Chronic | ICD-10-CM

## 2020-07-08 DIAGNOSIS — Z98.890 OTHER SPECIFIED POSTPROCEDURAL STATES: Chronic | ICD-10-CM

## 2020-07-08 DIAGNOSIS — Z98.891 HISTORY OF UTERINE SCAR FROM PREVIOUS SURGERY: Chronic | ICD-10-CM

## 2020-07-08 LAB
ALBUMIN SERPL ELPH-MCNC: 4.8 G/DL
ALP BLD-CCNC: 82 U/L
ALT SERPL-CCNC: 13 U/L
ANION GAP SERPL CALC-SCNC: 15 MMOL/L
AST SERPL-CCNC: 16 U/L
BASOPHILS # BLD AUTO: 0.01 K/UL
BASOPHILS # BLD AUTO: 0.01 K/UL — SIGNIFICANT CHANGE UP (ref 0–0.2)
BASOPHILS NFR BLD AUTO: 0.2 %
BASOPHILS NFR BLD AUTO: 0.2 % — SIGNIFICANT CHANGE UP (ref 0–2)
BILIRUB SERPL-MCNC: 0.6 MG/DL
BLD GP AB SCN SERPL QL: NEGATIVE — SIGNIFICANT CHANGE UP
BUN SERPL-MCNC: 15 MG/DL
CALCIUM SERPL-MCNC: 9.6 MG/DL
CHLORIDE SERPL-SCNC: 107 MMOL/L
CO2 SERPL-SCNC: 24 MMOL/L
CREAT SERPL-MCNC: 0.94 MG/DL
EOSINOPHIL # BLD AUTO: 0 K/UL
EOSINOPHIL # BLD AUTO: 0.01 K/UL — SIGNIFICANT CHANGE UP (ref 0–0.5)
EOSINOPHIL NFR BLD AUTO: 0 %
EOSINOPHIL NFR BLD AUTO: 0.2 % — SIGNIFICANT CHANGE UP (ref 0–6)
GLUCOSE SERPL-MCNC: 98 MG/DL
HCT VFR BLD CALC: 22.9 % — LOW (ref 34.5–45)
HCT VFR BLD CALC: 24.8 %
HGB BLD-MCNC: 7.5 G/DL — LOW (ref 11.5–15.5)
HGB BLD-MCNC: 8 G/DL
IMM GRANULOCYTES NFR BLD AUTO: 0.7 % — SIGNIFICANT CHANGE UP (ref 0–1.5)
IMM GRANULOCYTES NFR BLD AUTO: 0.9 %
LYMPHOCYTES # BLD AUTO: 1.17 K/UL — SIGNIFICANT CHANGE UP (ref 1–3.3)
LYMPHOCYTES # BLD AUTO: 1.19 K/UL
LYMPHOCYTES # BLD AUTO: 26.8 % — SIGNIFICANT CHANGE UP (ref 13–44)
LYMPHOCYTES NFR BLD AUTO: 26.2 %
MAGNESIUM SERPL-MCNC: 2 MG/DL
MAN DIFF?: NORMAL
MCHC RBC-ENTMCNC: 28.4 PG
MCHC RBC-ENTMCNC: 28.6 PG — SIGNIFICANT CHANGE UP (ref 27–34)
MCHC RBC-ENTMCNC: 32.3 GM/DL
MCHC RBC-ENTMCNC: 32.8 GM/DL — SIGNIFICANT CHANGE UP (ref 32–36)
MCV RBC AUTO: 87.4 FL — SIGNIFICANT CHANGE UP (ref 80–100)
MCV RBC AUTO: 87.9 FL
MONOCYTES # BLD AUTO: 0.39 K/UL
MONOCYTES # BLD AUTO: 0.46 K/UL — SIGNIFICANT CHANGE UP (ref 0–0.9)
MONOCYTES NFR BLD AUTO: 10.5 % — SIGNIFICANT CHANGE UP (ref 2–14)
MONOCYTES NFR BLD AUTO: 8.6 %
NEUTROPHILS # BLD AUTO: 2.69 K/UL — SIGNIFICANT CHANGE UP (ref 1.8–7.4)
NEUTROPHILS # BLD AUTO: 2.91 K/UL
NEUTROPHILS NFR BLD AUTO: 61.6 % — SIGNIFICANT CHANGE UP (ref 43–77)
NEUTROPHILS NFR BLD AUTO: 64.1 %
NRBC # BLD: 0 /100 WBCS — SIGNIFICANT CHANGE UP (ref 0–0)
PLATELET # BLD AUTO: 70 K/UL
PLATELET # BLD AUTO: 82 K/UL — LOW (ref 150–400)
POTASSIUM SERPL-SCNC: 3.9 MMOL/L
PROT SERPL-MCNC: 7 G/DL
RBC # BLD: 2.62 M/UL — LOW (ref 3.8–5.2)
RBC # BLD: 2.82 M/UL
RBC # FLD: 19 %
RBC # FLD: 19.1 % — HIGH (ref 10.3–14.5)
RH IG SCN BLD-IMP: NEGATIVE — SIGNIFICANT CHANGE UP
SARS-COV-2 N GENE NPH QL NAA+PROBE: NOT DETECTED
SODIUM SERPL-SCNC: 146 MMOL/L
WBC # BLD: 4.37 K/UL — SIGNIFICANT CHANGE UP (ref 3.8–10.5)
WBC # FLD AUTO: 4.37 K/UL — SIGNIFICANT CHANGE UP (ref 3.8–10.5)
WBC # FLD AUTO: 4.54 K/UL

## 2020-07-09 ENCOUNTER — APPOINTMENT (OUTPATIENT)
Dept: HEMATOLOGY ONCOLOGY | Facility: CLINIC | Age: 60
End: 2020-07-09

## 2020-07-10 ENCOUNTER — APPOINTMENT (OUTPATIENT)
Dept: INFUSION THERAPY | Facility: HOSPITAL | Age: 60
End: 2020-07-10

## 2020-07-13 DIAGNOSIS — Z51.89 ENCOUNTER FOR OTHER SPECIFIED AFTERCARE: ICD-10-CM

## 2020-07-14 ENCOUNTER — RESULT REVIEW (OUTPATIENT)
Age: 60
End: 2020-07-14

## 2020-07-14 ENCOUNTER — APPOINTMENT (OUTPATIENT)
Dept: HEMATOLOGY ONCOLOGY | Facility: CLINIC | Age: 60
End: 2020-07-14

## 2020-07-14 LAB
BASOPHILS # BLD AUTO: 0.01 K/UL — SIGNIFICANT CHANGE UP (ref 0–0.2)
BASOPHILS NFR BLD AUTO: 0.2 % — SIGNIFICANT CHANGE UP (ref 0–2)
EOSINOPHIL # BLD AUTO: 0.01 K/UL — SIGNIFICANT CHANGE UP (ref 0–0.5)
EOSINOPHIL NFR BLD AUTO: 0.2 % — SIGNIFICANT CHANGE UP (ref 0–6)
HCT VFR BLD CALC: 29.1 % — LOW (ref 34.5–45)
HGB BLD-MCNC: 9.6 G/DL — LOW (ref 11.5–15.5)
IMM GRANULOCYTES NFR BLD AUTO: 0.4 % — SIGNIFICANT CHANGE UP (ref 0–1.5)
LYMPHOCYTES # BLD AUTO: 1 K/UL — SIGNIFICANT CHANGE UP (ref 1–3.3)
LYMPHOCYTES # BLD AUTO: 21.6 % — SIGNIFICANT CHANGE UP (ref 13–44)
MCHC RBC-ENTMCNC: 29.4 PG — SIGNIFICANT CHANGE UP (ref 27–34)
MCHC RBC-ENTMCNC: 33 GM/DL — SIGNIFICANT CHANGE UP (ref 32–36)
MCV RBC AUTO: 89 FL — SIGNIFICANT CHANGE UP (ref 80–100)
MONOCYTES # BLD AUTO: 0.38 K/UL — SIGNIFICANT CHANGE UP (ref 0–0.9)
MONOCYTES NFR BLD AUTO: 8.2 % — SIGNIFICANT CHANGE UP (ref 2–14)
NEUTROPHILS # BLD AUTO: 3.2 K/UL — SIGNIFICANT CHANGE UP (ref 1.8–7.4)
NEUTROPHILS NFR BLD AUTO: 69.4 % — SIGNIFICANT CHANGE UP (ref 43–77)
NRBC # BLD: 0 /100 WBCS — SIGNIFICANT CHANGE UP (ref 0–0)
PLATELET # BLD AUTO: 277 K/UL — SIGNIFICANT CHANGE UP (ref 150–400)
RBC # BLD: 3.27 M/UL — LOW (ref 3.8–5.2)
RBC # FLD: 18.4 % — HIGH (ref 10.3–14.5)
WBC # BLD: 4.62 K/UL — SIGNIFICANT CHANGE UP (ref 3.8–10.5)
WBC # FLD AUTO: 4.62 K/UL — SIGNIFICANT CHANGE UP (ref 3.8–10.5)

## 2020-07-14 NOTE — ASSESSMENT
[FreeTextEntry1] : Ms. Manriquez is a 61 y/o postmenopausal F with history of stage IB endometrioid endometrial adenocarcinoma, grade 2 +LVSI, 17/20mm muscle invasion s/p RTLH/BSO, SLN, PPALND in 7/2018 followed by adjuvant vaginal brachytherapy completed in 11/2018, who was PHUONG until February 2020 when she was found to have recurrent metastatic disease, now biopsy-proven from new abdominal wall mass. \par \par S/p C5 carbo/abraxane on 5/29/20. She had a taxol reaction that included facial flushing, chest discomfort and SOB with C3 and switched to abraxane. Continues to tolerate chemotherapy without significant toxicities.\par \par \par Plan:\par -Interval bloodwork next week and proceed with C6 carbo/abraxane\par -plan for scans after next cycle to assess response to \par -RTC 3 weeks \par \par The patient is in agreement with the plan of care. The patient and her 's questions and concerns were addressed in full. \par \par

## 2020-07-14 NOTE — HISTORY OF PRESENT ILLNESS
[Home] : at home, [unfilled] , at the time of the visit. [Spouse] : spouse [Medical Office: ([facility])___] : at ~his/her~ medical office located in [place] [Patient] : the patient [Self] : self [de-identified] : Referred by Dr. Fuentes\par Telephone visit:\par Due to the COVID19 pandemic, this interview was conducted over the telephone, with the patient and her . I was not able to do a physical exam during this interview. 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 She is being referred to medical oncology for treatment considerations for recurrent metastatic disease.\par \par Ms. Manriquez initially presented to her GYN (Dr. Powell, 5/17/18) for postmenopausal bleeding that began shortly after Lap cholecystectomy (4/9/18). She underwent a Pelvic US that showed 10.8 x 6.7 x 6.1 cm uterus with fibroids. She underwent endometrial biopsy with Dr. English at [place] on 5/21/18. Pathology was significant for endometrial adenocarcinoma, endometroid type, FIGO grade 1 nuclear grade 2 with foci of squamous differentiation. \par \par MRI of the pelvis on 5/24/18 showed a large mass involving the lower uterine segment and body. There were no pathologically enlarged lymph nodes but there was a large heterogeneous lesion involving the left sacrum suspicious for osseous metastatic disease. She underwent biopsy of the left sacral lesion which was read as consistent with hemangioma to rule out metastatic disease.\par \par She subsequently underwent comprehensive surgical staging by Dr. Fuentes including RTLH, BSO, SLN, PPALND on 7/19/18. Pathology for significant for FIGO grade 1 nuclear grade 2 invasive adenocarcinoma with LVSI and 17/20 mm muscle invasion, stage IB. There was no cervical stromal invasion, lower uterine segment invasion, or uterine serosal involvement. All the sampled left pelvic, right pelvic and para-aortic nodes were free of cancer. \par \par Postoperatively she did well and received vaginal brachytherapy 2500 cGy in 5 fractions over 10 days completed 9/17/18 with Dr. Littlejohn of radiation oncology. \par \par She felt well without any concerning symptoms and was PHUONG under surveillance until she reported new left sided abdominal pain and a firmness. In retrospect, she thinks that she felt something different in her L abdomen/pelvis for the past 1-2 months. She followed up with Dr. Fuentes who palpated a 3cm left sided mass under robotic trochar incision suspicious for recurrence. \par \par CT CAP 2/15/19 demonstrated:\par New pulmonary nodule (two subcm nodules, new), external iliac lymphadenopathy, soft tissue intraperitoneal implants, and intramuscular enhancing nodules consistent with metastatic disease. \par \par PERITONEUM: Trace amount of pelvic fluid. There multiple new soft tissue masses, for instance \par adjacent to the descending colon along the left paracolic gutter measuring 4.6 x 3.1 x 2.2 cm and \par within the right lower quadrant measuring 3.6 x 3.4 x 1.8 cm. \par VESSELS: Within normal limits. \par RETROPERITONEUM/LYMPH NODES: Bilateral new enlarged external iliac lymph nodes (2:133) \par measuring up to 2.2 x 1.7 cm on the left \par ABDOMINAL WALL: Within the left rectus muscle there is an enhancing mass measuring 5.0 x 2.8 x \par 5.7 cm, with an additional nodule within the anterior subcutaneous tissues. There is an additional 2 \par cm mass abutting the inferior left rectus muscle. \par BONES: Within normal limits. \par \par She underwent US-guided abdominal wall mass biopsy on 3/6 by IR. Pathology review from the biopsy demonstrated metastatic adenocarcinoma, c/w metastasis from patient's known history of endometroid endometrial adenocarcinoma. \par \par Addendum\par Addendum issued to report results of additional immunostains\par P53:Wild type; Ki-67 is mildly elevated, WT1 is negative\par The cytomorphology in conjunction with the immunophenotype is consistent with metastases from a primary endometrioid endometrial adenocarcinoma.\par \par She has been referred to medical oncology to discuss systemic treatment for her recurrent endometrial cancer. She feels very well overall, although she continues to feel the intermittent L sided abdominal discomfort and pulling sensation. She is working full time and lives with her . She has a daughter. She has a good appetite and denies any weight changes.  [de-identified] : The patient is here for follow up after C5 carbo/taxol. \par \par She feels well overall since last treatment, with good appetite and energy. She has been staying active and continues to work from home. She has grade 1 fatigue during the week following chemotherapy that improves after, and continues to have stable grade 1 neuropathy involving her toes and soles of the feet. She thinks neuropathy symptoms post-treatment is better with reduced dosing of taxol. She continues to have intermittent left sided abdominal discomfort, well controlled with Tylenol and tramadol. She says it is annoying but does not want anything "stronger" as it is not all the time. She also states that there is an area on her right anterior thigh that is sometimes itchy but does not have any rash and the itchiness can "travel up." She had a yeast infection that resolved with OTC antifungal cream. Otherwise, she has no other new complaints. She denies fevers, chills, nausea, vomiting, abdominal pain, urinary symptoms, vaginal bleeding/discharge, CP or SOB.

## 2020-07-14 NOTE — REVIEW OF SYSTEMS
[Neuropathy] : neuropathy [Negative] : Musculoskeletal [FreeTextEntry2] : grade 1 neuropathy of fingers and toes b/l, improved after this cycle  [de-identified] : mild constipation, occasional R sided/umbilical abdominal discomfort

## 2020-07-14 NOTE — PHYSICAL EXAM
[Fully active, able to carry on all pre-disease performance without restriction] : Status 0 - Fully active, able to carry on all pre-disease performance without restriction [Normal] : affect appropriate [de-identified] : Normal respiratory effort and rate

## 2020-07-17 ENCOUNTER — RESULT REVIEW (OUTPATIENT)
Age: 60
End: 2020-07-17

## 2020-07-17 ENCOUNTER — APPOINTMENT (OUTPATIENT)
Dept: INFUSION THERAPY | Facility: HOSPITAL | Age: 60
End: 2020-07-17

## 2020-07-17 LAB
BASOPHILS # BLD AUTO: 0.02 K/UL — SIGNIFICANT CHANGE UP (ref 0–0.2)
BASOPHILS NFR BLD AUTO: 0.4 % — SIGNIFICANT CHANGE UP (ref 0–2)
EOSINOPHIL # BLD AUTO: 0.01 K/UL — SIGNIFICANT CHANGE UP (ref 0–0.5)
EOSINOPHIL NFR BLD AUTO: 0.2 % — SIGNIFICANT CHANGE UP (ref 0–6)
HCT VFR BLD CALC: 27.2 % — LOW (ref 34.5–45)
HGB BLD-MCNC: 9.5 G/DL — LOW (ref 11.5–15.5)
IMM GRANULOCYTES NFR BLD AUTO: 0.8 % — SIGNIFICANT CHANGE UP (ref 0–1.5)
LYMPHOCYTES # BLD AUTO: 1.57 K/UL — SIGNIFICANT CHANGE UP (ref 1–3.3)
LYMPHOCYTES # BLD AUTO: 31.8 % — SIGNIFICANT CHANGE UP (ref 13–44)
MCHC RBC-ENTMCNC: 29.5 PG — SIGNIFICANT CHANGE UP (ref 27–34)
MCHC RBC-ENTMCNC: 34.9 GM/DL — SIGNIFICANT CHANGE UP (ref 32–36)
MCV RBC AUTO: 84.5 FL — SIGNIFICANT CHANGE UP (ref 80–100)
MONOCYTES # BLD AUTO: 0.55 K/UL — SIGNIFICANT CHANGE UP (ref 0–0.9)
MONOCYTES NFR BLD AUTO: 11.2 % — SIGNIFICANT CHANGE UP (ref 2–14)
NEUTROPHILS # BLD AUTO: 2.74 K/UL — SIGNIFICANT CHANGE UP (ref 1.8–7.4)
NEUTROPHILS NFR BLD AUTO: 55.6 % — SIGNIFICANT CHANGE UP (ref 43–77)
NRBC # BLD: 0 /100 WBCS — SIGNIFICANT CHANGE UP (ref 0–0)
PLATELET # BLD AUTO: 355 K/UL — SIGNIFICANT CHANGE UP (ref 150–400)
RBC # BLD: 3.22 M/UL — LOW (ref 3.8–5.2)
RBC # FLD: 17.9 % — HIGH (ref 10.3–14.5)
WBC # BLD: 4.93 K/UL — SIGNIFICANT CHANGE UP (ref 3.8–10.5)
WBC # FLD AUTO: 4.93 K/UL — SIGNIFICANT CHANGE UP (ref 3.8–10.5)

## 2020-07-20 DIAGNOSIS — R11.2 NAUSEA WITH VOMITING, UNSPECIFIED: ICD-10-CM

## 2020-07-20 DIAGNOSIS — Z51.11 ENCOUNTER FOR ANTINEOPLASTIC CHEMOTHERAPY: ICD-10-CM

## 2020-07-21 LAB
ALBUMIN SERPL ELPH-MCNC: 4.9 G/DL
ALP BLD-CCNC: 79 U/L
ALT SERPL-CCNC: 11 U/L
ANION GAP SERPL CALC-SCNC: 13 MMOL/L
AST SERPL-CCNC: 17 U/L
BILIRUB SERPL-MCNC: 0.7 MG/DL
BUN SERPL-MCNC: 20 MG/DL
CALCIUM SERPL-MCNC: 9.4 MG/DL
CHLORIDE SERPL-SCNC: 104 MMOL/L
CO2 SERPL-SCNC: 25 MMOL/L
CREAT SERPL-MCNC: 0.85 MG/DL
GLUCOSE SERPL-MCNC: 98 MG/DL
MAGNESIUM SERPL-MCNC: 2 MG/DL
POTASSIUM SERPL-SCNC: 4.3 MMOL/L
PROT SERPL-MCNC: 6.9 G/DL
SODIUM SERPL-SCNC: 142 MMOL/L

## 2020-07-24 ENCOUNTER — OUTPATIENT (OUTPATIENT)
Dept: OUTPATIENT SERVICES | Facility: HOSPITAL | Age: 60
LOS: 1 days | Discharge: HOME | End: 2020-07-24

## 2020-07-24 ENCOUNTER — APPOINTMENT (OUTPATIENT)
Dept: HEMATOLOGY ONCOLOGY | Facility: CLINIC | Age: 60
End: 2020-07-24

## 2020-07-24 DIAGNOSIS — Z90.49 ACQUIRED ABSENCE OF OTHER SPECIFIED PARTS OF DIGESTIVE TRACT: Chronic | ICD-10-CM

## 2020-07-24 DIAGNOSIS — Z98.890 OTHER SPECIFIED POSTPROCEDURAL STATES: Chronic | ICD-10-CM

## 2020-07-24 DIAGNOSIS — Z98.891 HISTORY OF UTERINE SCAR FROM PREVIOUS SURGERY: Chronic | ICD-10-CM

## 2020-07-24 DIAGNOSIS — C54.1 MALIGNANT NEOPLASM OF ENDOMETRIUM: ICD-10-CM

## 2020-07-27 ENCOUNTER — APPOINTMENT (OUTPATIENT)
Dept: HEMATOLOGY ONCOLOGY | Facility: CLINIC | Age: 60
End: 2020-07-27

## 2020-07-27 ENCOUNTER — RESULT REVIEW (OUTPATIENT)
Age: 60
End: 2020-07-27

## 2020-07-27 LAB
BASOPHILS # BLD AUTO: 0.03 K/UL — SIGNIFICANT CHANGE UP (ref 0–0.2)
BASOPHILS NFR BLD AUTO: 2 % — SIGNIFICANT CHANGE UP (ref 0–2)
BLD GP AB SCN SERPL QL: NEGATIVE — SIGNIFICANT CHANGE UP
EOSINOPHIL # BLD AUTO: 0 K/UL — SIGNIFICANT CHANGE UP (ref 0–0.5)
EOSINOPHIL NFR BLD AUTO: 0 % — SIGNIFICANT CHANGE UP (ref 0–6)
HCT VFR BLD CALC: 23.5 % — LOW (ref 34.5–45)
HGB BLD-MCNC: 8.1 G/DL — LOW (ref 11.5–15.5)
LYMPHOCYTES # BLD AUTO: 1.18 K/UL — SIGNIFICANT CHANGE UP (ref 1–3.3)
LYMPHOCYTES # BLD AUTO: 74 % — HIGH (ref 13–44)
MCHC RBC-ENTMCNC: 29.8 PG — SIGNIFICANT CHANGE UP (ref 27–34)
MCHC RBC-ENTMCNC: 34.5 GM/DL — SIGNIFICANT CHANGE UP (ref 32–36)
MCV RBC AUTO: 86.4 FL — SIGNIFICANT CHANGE UP (ref 80–100)
MONOCYTES # BLD AUTO: 0.17 K/UL — SIGNIFICANT CHANGE UP (ref 0–0.9)
MONOCYTES NFR BLD AUTO: 11 % — SIGNIFICANT CHANGE UP (ref 2–14)
NEUTROPHILS # BLD AUTO: 0.21 K/UL — LOW (ref 1.8–7.4)
NEUTROPHILS NFR BLD AUTO: 13 % — LOW (ref 43–77)
NRBC # BLD: 0 /100 — SIGNIFICANT CHANGE UP (ref 0–0)
NRBC # BLD: SIGNIFICANT CHANGE UP /100 WBCS (ref 0–0)
PLAT MORPH BLD: NORMAL — SIGNIFICANT CHANGE UP
PLATELET # BLD AUTO: 160 K/UL — SIGNIFICANT CHANGE UP (ref 150–400)
RBC # BLD: 2.72 M/UL — LOW (ref 3.8–5.2)
RBC # FLD: 15.2 % — HIGH (ref 10.3–14.5)
RBC BLD AUTO: SIGNIFICANT CHANGE UP
RH IG SCN BLD-IMP: NEGATIVE — SIGNIFICANT CHANGE UP
WBC # BLD: 1.59 K/UL — LOW (ref 3.8–10.5)
WBC # FLD AUTO: 1.59 K/UL — LOW (ref 3.8–10.5)

## 2020-07-28 LAB
ALBUMIN SERPL ELPH-MCNC: 4.6 G/DL
ALP BLD-CCNC: 77 U/L
ALT SERPL-CCNC: 14 U/L
ANION GAP SERPL CALC-SCNC: 13 MMOL/L
AST SERPL-CCNC: 14 U/L
BILIRUB SERPL-MCNC: 0.3 MG/DL
BUN SERPL-MCNC: 19 MG/DL
CALCIUM SERPL-MCNC: 9.5 MG/DL
CHLORIDE SERPL-SCNC: 105 MMOL/L
CO2 SERPL-SCNC: 23 MMOL/L
CREAT SERPL-MCNC: 0.83 MG/DL
GLUCOSE SERPL-MCNC: 89 MG/DL
MAGNESIUM SERPL-MCNC: 1.8 MG/DL
POTASSIUM SERPL-SCNC: 4.4 MMOL/L
PROT SERPL-MCNC: 6.4 G/DL
SODIUM SERPL-SCNC: 140 MMOL/L

## 2020-07-28 PROCEDURE — 86850 RBC ANTIBODY SCREEN: CPT

## 2020-07-28 PROCEDURE — 86900 BLOOD TYPING SEROLOGIC ABO: CPT

## 2020-07-28 PROCEDURE — 86923 COMPATIBILITY TEST ELECTRIC: CPT

## 2020-07-28 PROCEDURE — 86901 BLOOD TYPING SEROLOGIC RH(D): CPT

## 2020-07-29 ENCOUNTER — APPOINTMENT (OUTPATIENT)
Dept: HEMATOLOGY ONCOLOGY | Facility: CLINIC | Age: 60
End: 2020-07-29

## 2020-07-29 ENCOUNTER — APPOINTMENT (OUTPATIENT)
Dept: INFUSION THERAPY | Facility: HOSPITAL | Age: 60
End: 2020-07-29

## 2020-07-30 ENCOUNTER — APPOINTMENT (OUTPATIENT)
Dept: INFUSION THERAPY | Facility: HOSPITAL | Age: 60
End: 2020-07-30

## 2020-07-30 DIAGNOSIS — Z51.89 ENCOUNTER FOR OTHER SPECIFIED AFTERCARE: ICD-10-CM

## 2020-08-04 ENCOUNTER — OUTPATIENT (OUTPATIENT)
Dept: OUTPATIENT SERVICES | Facility: HOSPITAL | Age: 60
LOS: 1 days | End: 2020-08-04
Payer: COMMERCIAL

## 2020-08-04 ENCOUNTER — APPOINTMENT (OUTPATIENT)
Dept: CT IMAGING | Facility: IMAGING CENTER | Age: 60
End: 2020-08-04
Payer: COMMERCIAL

## 2020-08-04 DIAGNOSIS — Z90.49 ACQUIRED ABSENCE OF OTHER SPECIFIED PARTS OF DIGESTIVE TRACT: Chronic | ICD-10-CM

## 2020-08-04 DIAGNOSIS — C54.1 MALIGNANT NEOPLASM OF ENDOMETRIUM: ICD-10-CM

## 2020-08-04 DIAGNOSIS — Z98.891 HISTORY OF UTERINE SCAR FROM PREVIOUS SURGERY: Chronic | ICD-10-CM

## 2020-08-04 DIAGNOSIS — Z98.890 OTHER SPECIFIED POSTPROCEDURAL STATES: Chronic | ICD-10-CM

## 2020-08-04 PROCEDURE — 74177 CT ABD & PELVIS W/CONTRAST: CPT | Mod: 26

## 2020-08-04 PROCEDURE — 74177 CT ABD & PELVIS W/CONTRAST: CPT

## 2020-08-07 RX ORDER — TRAMADOL HYDROCHLORIDE 50 MG/1
50 TABLET, COATED ORAL
Qty: 60 | Refills: 0 | Status: ACTIVE | COMMUNITY
Start: 2020-06-04 | End: 1900-01-01

## 2020-08-10 ENCOUNTER — APPOINTMENT (OUTPATIENT)
Dept: HEMATOLOGY ONCOLOGY | Facility: CLINIC | Age: 60
End: 2020-08-10
Payer: COMMERCIAL

## 2020-08-10 PROCEDURE — 99214 OFFICE O/P EST MOD 30 MIN: CPT | Mod: 95

## 2020-08-11 ENCOUNTER — RESULT REVIEW (OUTPATIENT)
Age: 60
End: 2020-08-11

## 2020-08-11 ENCOUNTER — APPOINTMENT (OUTPATIENT)
Dept: INFUSION THERAPY | Facility: HOSPITAL | Age: 60
End: 2020-08-11

## 2020-08-11 ENCOUNTER — APPOINTMENT (OUTPATIENT)
Dept: HEMATOLOGY ONCOLOGY | Facility: CLINIC | Age: 60
End: 2020-08-11

## 2020-08-11 LAB
BASOPHILS # BLD AUTO: 0 K/UL — SIGNIFICANT CHANGE UP (ref 0–0.2)
BASOPHILS NFR BLD AUTO: 0 % — SIGNIFICANT CHANGE UP (ref 0–2)
EOSINOPHIL # BLD AUTO: 0 K/UL — SIGNIFICANT CHANGE UP (ref 0–0.5)
EOSINOPHIL NFR BLD AUTO: 0 % — SIGNIFICANT CHANGE UP (ref 0–6)
HCT VFR BLD CALC: 28.1 % — LOW (ref 34.5–45)
HGB BLD-MCNC: 9.3 G/DL — LOW (ref 11.5–15.5)
LYMPHOCYTES # BLD AUTO: 0.73 K/UL — LOW (ref 1–3.3)
LYMPHOCYTES # BLD AUTO: 28 % — SIGNIFICANT CHANGE UP (ref 13–44)
MCHC RBC-ENTMCNC: 29.8 PG — SIGNIFICANT CHANGE UP (ref 27–34)
MCHC RBC-ENTMCNC: 33.1 GM/DL — SIGNIFICANT CHANGE UP (ref 32–36)
MCV RBC AUTO: 90.1 FL — SIGNIFICANT CHANGE UP (ref 80–100)
MONOCYTES # BLD AUTO: 0.21 K/UL — SIGNIFICANT CHANGE UP (ref 0–0.9)
MONOCYTES NFR BLD AUTO: 8 % — SIGNIFICANT CHANGE UP (ref 2–14)
NEUTROPHILS # BLD AUTO: 1.66 K/UL — LOW (ref 1.8–7.4)
NEUTROPHILS NFR BLD AUTO: 64 % — SIGNIFICANT CHANGE UP (ref 43–77)
NRBC # BLD: 0 /100 — SIGNIFICANT CHANGE UP (ref 0–0)
NRBC # BLD: SIGNIFICANT CHANGE UP /100 WBCS (ref 0–0)
PLAT MORPH BLD: NORMAL — SIGNIFICANT CHANGE UP
PLATELET # BLD AUTO: 252 K/UL — SIGNIFICANT CHANGE UP (ref 150–400)
RBC # BLD: 3.12 M/UL — LOW (ref 3.8–5.2)
RBC # FLD: 15.2 % — HIGH (ref 10.3–14.5)
RBC BLD AUTO: SIGNIFICANT CHANGE UP
WBC # BLD: 2.59 K/UL — LOW (ref 3.8–10.5)
WBC # FLD AUTO: 2.59 K/UL — LOW (ref 3.8–10.5)

## 2020-08-12 LAB
ALBUMIN SERPL ELPH-MCNC: 4.6 G/DL
ALP BLD-CCNC: 91 U/L
ALT SERPL-CCNC: 17 U/L
ANION GAP SERPL CALC-SCNC: 10 MMOL/L
AST SERPL-CCNC: 18 U/L
BILIRUB SERPL-MCNC: 0.4 MG/DL
BUN SERPL-MCNC: 17 MG/DL
CALCIUM SERPL-MCNC: 9.1 MG/DL
CHLORIDE SERPL-SCNC: 103 MMOL/L
CO2 SERPL-SCNC: 25 MMOL/L
CREAT SERPL-MCNC: 0.8 MG/DL
GLUCOSE SERPL-MCNC: 92 MG/DL
MAGNESIUM SERPL-MCNC: 1.9 MG/DL
POTASSIUM SERPL-SCNC: 4.1 MMOL/L
PROT SERPL-MCNC: 6.5 G/DL
SARS-COV-2 N GENE NPH QL NAA+PROBE: NOT DETECTED
SODIUM SERPL-SCNC: 138 MMOL/L

## 2020-08-13 NOTE — PAST MEDICAL HISTORY
[Postmenopausal] : The patient is postmenopausal [Menopause Age____] : age at menopause was [unfilled] [Total Preg ___] : G[unfilled] [Live Births ___] : P[unfilled]  [Full Term ___] : Full Term: [unfilled] [Living ___] : Living: [unfilled] [Abortions ___] : Abortions:[unfilled]

## 2020-08-17 ENCOUNTER — APPOINTMENT (OUTPATIENT)
Dept: INFUSION THERAPY | Facility: HOSPITAL | Age: 60
End: 2020-08-17

## 2020-08-17 ENCOUNTER — RESULT REVIEW (OUTPATIENT)
Age: 60
End: 2020-08-17

## 2020-08-17 LAB
BASOPHILS # BLD AUTO: 0 K/UL — SIGNIFICANT CHANGE UP (ref 0–0.2)
BASOPHILS NFR BLD AUTO: 0 % — SIGNIFICANT CHANGE UP (ref 0–2)
EOSINOPHIL # BLD AUTO: 0 K/UL — SIGNIFICANT CHANGE UP (ref 0–0.5)
EOSINOPHIL NFR BLD AUTO: 0 % — SIGNIFICANT CHANGE UP (ref 0–6)
HCT VFR BLD CALC: 28.2 % — LOW (ref 34.5–45)
HGB BLD-MCNC: 9.8 G/DL — LOW (ref 11.5–15.5)
LYMPHOCYTES # BLD AUTO: 1.48 K/UL — SIGNIFICANT CHANGE UP (ref 1–3.3)
LYMPHOCYTES # BLD AUTO: 35 % — SIGNIFICANT CHANGE UP (ref 13–44)
MCHC RBC-ENTMCNC: 30.5 PG — SIGNIFICANT CHANGE UP (ref 27–34)
MCHC RBC-ENTMCNC: 34.8 GM/DL — SIGNIFICANT CHANGE UP (ref 32–36)
MCV RBC AUTO: 87.9 FL — SIGNIFICANT CHANGE UP (ref 80–100)
MONOCYTES # BLD AUTO: 0.34 K/UL — SIGNIFICANT CHANGE UP (ref 0–0.9)
MONOCYTES NFR BLD AUTO: 8 % — SIGNIFICANT CHANGE UP (ref 2–14)
NEUTROPHILS # BLD AUTO: 2.41 K/UL — SIGNIFICANT CHANGE UP (ref 1.8–7.4)
NEUTROPHILS NFR BLD AUTO: 57 % — SIGNIFICANT CHANGE UP (ref 43–77)
NRBC # BLD: 0 /100 — SIGNIFICANT CHANGE UP (ref 0–0)
NRBC # BLD: SIGNIFICANT CHANGE UP /100 WBCS (ref 0–0)
PLAT MORPH BLD: NORMAL — SIGNIFICANT CHANGE UP
PLATELET # BLD AUTO: 262 K/UL — SIGNIFICANT CHANGE UP (ref 150–400)
RBC # BLD: 3.21 M/UL — LOW (ref 3.8–5.2)
RBC # FLD: 14.9 % — HIGH (ref 10.3–14.5)
RBC BLD AUTO: SIGNIFICANT CHANGE UP
WBC # BLD: 4.23 K/UL — SIGNIFICANT CHANGE UP (ref 3.8–10.5)
WBC # FLD AUTO: 4.23 K/UL — SIGNIFICANT CHANGE UP (ref 3.8–10.5)

## 2020-08-18 ENCOUNTER — APPOINTMENT (OUTPATIENT)
Dept: INFUSION THERAPY | Facility: HOSPITAL | Age: 60
End: 2020-08-18

## 2020-08-18 DIAGNOSIS — Z51.11 ENCOUNTER FOR ANTINEOPLASTIC CHEMOTHERAPY: ICD-10-CM

## 2020-08-18 DIAGNOSIS — R11.2 NAUSEA WITH VOMITING, UNSPECIFIED: ICD-10-CM

## 2020-08-21 ENCOUNTER — OUTPATIENT (OUTPATIENT)
Dept: OUTPATIENT SERVICES | Facility: HOSPITAL | Age: 60
LOS: 1 days | Discharge: HOME | End: 2020-08-21

## 2020-08-21 DIAGNOSIS — Z90.49 ACQUIRED ABSENCE OF OTHER SPECIFIED PARTS OF DIGESTIVE TRACT: Chronic | ICD-10-CM

## 2020-08-21 DIAGNOSIS — Z98.890 OTHER SPECIFIED POSTPROCEDURAL STATES: Chronic | ICD-10-CM

## 2020-08-21 DIAGNOSIS — Z98.891 HISTORY OF UTERINE SCAR FROM PREVIOUS SURGERY: Chronic | ICD-10-CM

## 2020-08-21 DIAGNOSIS — C54.1 MALIGNANT NEOPLASM OF ENDOMETRIUM: ICD-10-CM

## 2020-08-23 NOTE — PHYSICAL EXAM
[Normal] : grossly intact [Restricted in physically strenuous activity but ambulatory and able to carry out work of a light or sedentary nature] : Status 1- Restricted in physically strenuous activity but ambulatory and able to carry out work of a light or sedentary nature, e.g., light house work, office work [de-identified] : +alopecia  [de-identified] : Normal respiratory effort and rate

## 2020-08-23 NOTE — ASSESSMENT
[FreeTextEntry1] : Ms. Manriquez is a 61 y/o postmenopausal F with history of stage IB endometrioid endometrial adenocarcinoma, grade 2 +LVSI, 17/20mm muscle invasion s/p RTLH/BSO, SLN, PPALND in 7/2018 followed by adjuvant vaginal brachytherapy completed in 11/2018, who was PHUONG until February 2020 when she was found to have recurrent metastatic disease, now biopsy-proven from new abdominal wall mass. \par \par S/p C6 carbo/abraxane. She had a taxol reaction that included facial flushing, chest discomfort and SOB with C3 and switched to abraxane. Continues to tolerate chemotherapy without significant toxicities.\par \par We reviewed the interval CT scans from 8/4/20 that shows overall stable disease, with mild improvement of the abdominal wall mass and peritoneal disease. No new lesions identified. \par \par I reviewed my recommendations for continued treatment with current regimen at this time. The patient and and her  inquired about surgical resection, and I explained that this was an unlikely option given her extent of disease and recurrent nature of her disease after her initial surgery with Dr. Fuentes. The patient and her  would like to discuss further with Dr. Fuentes but are in agreement with the current plan for continued chemotherapy. \par \par Plan:\par -Interval bloodwork next week and proceed with carbo/abraxane\par -palliative care referral made for pain management and symptoms \par -f/u gyn onc Dr. Fuentes\par -RTC 3 weeks \par \par The patient is in agreement with the plan of care. The patient and her 's questions and concerns were addressed in full. \par \par

## 2020-08-23 NOTE — REVIEW OF SYSTEMS
[Negative] : Musculoskeletal [Neuropathy] : neuropathy [FreeTextEntry2] : grade 1 neuropathy of fingers and toes b/l, improved after this cycle  [de-identified] : mild constipation, occasional R sided/umbilical abdominal discomfort

## 2020-08-23 NOTE — RESULTS/DATA
[FreeTextEntry1] : CT A/P 8/4/20:\par \par FINDINGS:\par LOWER CHEST: Within normal limits.\par \par LIVER: Within normal limits.\par BILE DUCTS: Normal caliber.\par GALLBLADDER: Cholecystectomy.\par SPLEEN: Stable hypervascular lesion of the spleen measuring 1.3 cm. A hemangioma or hamartoma is considered. Accessory splenic nodules in the left upper quadrant stable\par PANCREAS: Within normal limits.\par ADRENALS: Within normal limits.\par KIDNEYS/URETERS: Within normal limits.\par \par BLADDER: Within normal limits.\par REPRODUCTIVE ORGANS: Hysterectomy.\par \par BOWEL: No bowel obstruction. Appendix is not visualized. No evidence of inflammation in the pericecal region.\par PERITONEUM: No ascites. Extensive peritoneal metastatic disease. For reference:\par Left lower quadrant/paracolic gutter (2: 77) is slightly smaller. It currently measures 3.2 x 2 cm where it previously measured 3.4 x 2.2 cm.\par Right lower quadrant (2:79) is smaller. It currently measures 2 x 1.5 cm where it previously measured 3.0 x 2.1 cm.\par VESSELS: Within normal limits.\par \par RETROPERITONEUM/LYMPH NODES: Bilateral obturator adenopathy is unchanged. A right obturator node measures 2.1 x 1.3 cm. The left obturator node measures 2.4 x 2 cm. These are measured on series 2 image 88. A right common iliac node is smaller. It measures 2.6 x 1.7 cm (2:70) previously measuring 3.2 x 2.1 cm.\par \par ABDOMINAL WALL: Abdominal wall mass with the largest in the left rectus musculature (2: 59) measuring 4.3 x 2.7 cm previously measuring 5.0 x 3.0 cm.\par \par BONES: Mild degenerative changes of the thoracolumbar junction.\par \par IMPRESSION:\par \par Mild interval improvement in peritoneal metastatic disease, abdominal wall mass and in right common iliac adenopathy.\par Unchanged bilateral obturator nodes.\par \par \par

## 2020-08-23 NOTE — HISTORY OF PRESENT ILLNESS
[Home] : at home, [unfilled] , at the time of the visit. [Medical Office: ([facility])___] : at ~his/her~ medical office located in [place] [Patient] : the patient [Spouse] : spouse [Self] : self [de-identified] : Referred by Dr. Fuentes\par Telephone visit:\par Due to the COVID19 pandemic, this interview was conducted over the telephone, with the patient and her . I was not able to do a physical exam during this interview. 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 She is being referred to medical oncology for treatment considerations for recurrent metastatic disease.\par \par Ms. Manriquez initially presented to her GYN (Dr. Powell, 5/17/18) for postmenopausal bleeding that began shortly after Lap cholecystectomy (4/9/18). She underwent a Pelvic US that showed 10.8 x 6.7 x 6.1 cm uterus with fibroids. She underwent endometrial biopsy with Dr. English at [place] on 5/21/18. Pathology was significant for endometrial adenocarcinoma, endometroid type, FIGO grade 1 nuclear grade 2 with foci of squamous differentiation. \par \par MRI of the pelvis on 5/24/18 showed a large mass involving the lower uterine segment and body. There were no pathologically enlarged lymph nodes but there was a large heterogeneous lesion involving the left sacrum suspicious for osseous metastatic disease. She underwent biopsy of the left sacral lesion which was read as consistent with hemangioma to rule out metastatic disease.\par \par She subsequently underwent comprehensive surgical staging by Dr. Fuentes including RTLH, BSO, SLN, PPALND on 7/19/18. Pathology for significant for FIGO grade 1 nuclear grade 2 invasive adenocarcinoma with LVSI and 17/20 mm muscle invasion, stage IB. There was no cervical stromal invasion, lower uterine segment invasion, or uterine serosal involvement. All the sampled left pelvic, right pelvic and para-aortic nodes were free of cancer. \par \par Postoperatively she did well and received vaginal brachytherapy 2500 cGy in 5 fractions over 10 days completed 9/17/18 with Dr. Littlejohn of radiation oncology. \par \par She felt well without any concerning symptoms and was PHUONG under surveillance until she reported new left sided abdominal pain and a firmness. In retrospect, she thinks that she felt something different in her L abdomen/pelvis for the past 1-2 months. She followed up with Dr. Fuentes who palpated a 3cm left sided mass under robotic trochar incision suspicious for recurrence. \par \par CT CAP 2/15/19 demonstrated:\par New pulmonary nodule (two subcm nodules, new), external iliac lymphadenopathy, soft tissue intraperitoneal implants, and intramuscular enhancing nodules consistent with metastatic disease. \par \par PERITONEUM: Trace amount of pelvic fluid. There multiple new soft tissue masses, for instance \par adjacent to the descending colon along the left paracolic gutter measuring 4.6 x 3.1 x 2.2 cm and \par within the right lower quadrant measuring 3.6 x 3.4 x 1.8 cm. \par VESSELS: Within normal limits. \par RETROPERITONEUM/LYMPH NODES: Bilateral new enlarged external iliac lymph nodes (2:133) \par measuring up to 2.2 x 1.7 cm on the left \par ABDOMINAL WALL: Within the left rectus muscle there is an enhancing mass measuring 5.0 x 2.8 x \par 5.7 cm, with an additional nodule within the anterior subcutaneous tissues. There is an additional 2 \par cm mass abutting the inferior left rectus muscle. \par BONES: Within normal limits. \par \par She underwent US-guided abdominal wall mass biopsy on 3/6 by IR. Pathology review from the biopsy demonstrated metastatic adenocarcinoma, c/w metastasis from patient's known history of endometroid endometrial adenocarcinoma. \par \par Addendum\par Addendum issued to report results of additional immunostains\par P53:Wild type; Ki-67 is mildly elevated, WT1 is negative\par The cytomorphology in conjunction with the immunophenotype is consistent with metastases from a primary endometrioid endometrial adenocarcinoma.\par \par She has been referred to medical oncology to discuss systemic treatment for her recurrent endometrial cancer. She feels very well overall, although she continues to feel the intermittent L sided abdominal discomfort and pulling sensation. She is working full time and lives with her . She has a daughter. She has a good appetite and denies any weight changes.  [de-identified] : The patient is here for follow up after C6 carbo/taxol. \par \par She feels well overall since last treatment, with good appetite and energy. She has been staying active and continues to work from home, walking the dog and managing all ADLs. She has grade 1 fatigue during the week following chemotherapy that improves to baseline, and continues to have stable grade 1 neuropathy involving her toes and soles of the feet. She thinks neuropathy symptoms post-treatment is better with reduced dosing of taxol. She continues to have intermittent left sided abdominal discomfort but this has improved overall. She continues to use Tylenol and tramadol daily, but says she has only needed Tramadol once a day last week or so. Otherwise, she has no other new complaints. She and her  would like to get back to Dr. Fuentes for surgery. She denies fevers, chills, nausea, vomiting, abdominal pain, urinary symptoms, vaginal bleeding/discharge, CP or SOB.

## 2020-08-27 ENCOUNTER — RESULT REVIEW (OUTPATIENT)
Age: 60
End: 2020-08-27

## 2020-08-27 ENCOUNTER — APPOINTMENT (OUTPATIENT)
Dept: HEMATOLOGY ONCOLOGY | Facility: CLINIC | Age: 60
End: 2020-08-27

## 2020-08-27 LAB
BASOPHILS # BLD AUTO: 0.09 K/UL — SIGNIFICANT CHANGE UP (ref 0–0.2)
BASOPHILS NFR BLD AUTO: 1 % — SIGNIFICANT CHANGE UP (ref 0–2)
EOSINOPHIL # BLD AUTO: 0 K/UL — SIGNIFICANT CHANGE UP (ref 0–0.5)
EOSINOPHIL NFR BLD AUTO: 0 % — SIGNIFICANT CHANGE UP (ref 0–6)
HCT VFR BLD CALC: 24.4 % — LOW (ref 34.5–45)
HGB BLD-MCNC: 8 G/DL — LOW (ref 11.5–15.5)
LYMPHOCYTES # BLD AUTO: 1.45 K/UL — SIGNIFICANT CHANGE UP (ref 1–3.3)
LYMPHOCYTES # BLD AUTO: 16 % — SIGNIFICANT CHANGE UP (ref 13–44)
MCHC RBC-ENTMCNC: 30.1 PG — SIGNIFICANT CHANGE UP (ref 27–34)
MCHC RBC-ENTMCNC: 32.8 GM/DL — SIGNIFICANT CHANGE UP (ref 32–36)
MCV RBC AUTO: 91.7 FL — SIGNIFICANT CHANGE UP (ref 80–100)
MONOCYTES # BLD AUTO: 0.91 K/UL — HIGH (ref 0–0.9)
MONOCYTES NFR BLD AUTO: 10 % — SIGNIFICANT CHANGE UP (ref 2–14)
NEUTROPHILS # BLD AUTO: 6.62 K/UL — SIGNIFICANT CHANGE UP (ref 1.8–7.4)
NEUTROPHILS NFR BLD AUTO: 73 % — SIGNIFICANT CHANGE UP (ref 43–77)
NRBC # BLD: 0 /100 — SIGNIFICANT CHANGE UP (ref 0–0)
NRBC # BLD: SIGNIFICANT CHANGE UP /100 WBCS (ref 0–0)
PLAT MORPH BLD: NORMAL — SIGNIFICANT CHANGE UP
PLATELET # BLD AUTO: 127 K/UL — LOW (ref 150–400)
RBC # BLD: 2.66 M/UL — LOW (ref 3.8–5.2)
RBC # FLD: 13.6 % — SIGNIFICANT CHANGE UP (ref 10.3–14.5)
RBC BLD AUTO: SIGNIFICANT CHANGE UP
WBC # BLD: 9.07 K/UL — SIGNIFICANT CHANGE UP (ref 3.8–10.5)
WBC # FLD AUTO: 9.07 K/UL — SIGNIFICANT CHANGE UP (ref 3.8–10.5)

## 2020-08-28 LAB
ALBUMIN SERPL ELPH-MCNC: 4.6 G/DL
ALP BLD-CCNC: 108 U/L
ALT SERPL-CCNC: 13 U/L
ANION GAP SERPL CALC-SCNC: 15 MMOL/L
AST SERPL-CCNC: 14 U/L
BILIRUB SERPL-MCNC: 0.2 MG/DL
BUN SERPL-MCNC: 15 MG/DL
CALCIUM SERPL-MCNC: 9.2 MG/DL
CANCER AG125 SERPL-ACNC: 9 U/ML
CEA SERPL-MCNC: 2 NG/ML
CHLORIDE SERPL-SCNC: 105 MMOL/L
CO2 SERPL-SCNC: 24 MMOL/L
CREAT SERPL-MCNC: 0.8 MG/DL
GLUCOSE SERPL-MCNC: 109 MG/DL
MAGNESIUM SERPL-MCNC: 1.6 MG/DL
POTASSIUM SERPL-SCNC: 3.9 MMOL/L
PROT SERPL-MCNC: 6.3 G/DL
SODIUM SERPL-SCNC: 144 MMOL/L

## 2020-08-31 ENCOUNTER — RESULT REVIEW (OUTPATIENT)
Age: 60
End: 2020-08-31

## 2020-08-31 ENCOUNTER — APPOINTMENT (OUTPATIENT)
Dept: HEMATOLOGY ONCOLOGY | Facility: CLINIC | Age: 60
End: 2020-08-31
Payer: COMMERCIAL

## 2020-08-31 ENCOUNTER — APPOINTMENT (OUTPATIENT)
Dept: HEMATOLOGY ONCOLOGY | Facility: CLINIC | Age: 60
End: 2020-08-31

## 2020-08-31 ENCOUNTER — OUTPATIENT (OUTPATIENT)
Dept: OUTPATIENT SERVICES | Facility: HOSPITAL | Age: 60
LOS: 1 days | End: 2020-08-31
Payer: COMMERCIAL

## 2020-08-31 DIAGNOSIS — C54.1 MALIGNANT NEOPLASM OF ENDOMETRIUM: ICD-10-CM

## 2020-08-31 DIAGNOSIS — Z98.890 OTHER SPECIFIED POSTPROCEDURAL STATES: Chronic | ICD-10-CM

## 2020-08-31 DIAGNOSIS — Z98.891 HISTORY OF UTERINE SCAR FROM PREVIOUS SURGERY: Chronic | ICD-10-CM

## 2020-08-31 DIAGNOSIS — Z90.49 ACQUIRED ABSENCE OF OTHER SPECIFIED PARTS OF DIGESTIVE TRACT: Chronic | ICD-10-CM

## 2020-08-31 LAB
BASOPHILS # BLD AUTO: 0.03 K/UL — SIGNIFICANT CHANGE UP (ref 0–0.2)
BASOPHILS NFR BLD AUTO: 0.4 % — SIGNIFICANT CHANGE UP (ref 0–2)
BLD GP AB SCN SERPL QL: NEGATIVE — SIGNIFICANT CHANGE UP
EOSINOPHIL # BLD AUTO: 0.02 K/UL — SIGNIFICANT CHANGE UP (ref 0–0.5)
EOSINOPHIL NFR BLD AUTO: 0.3 % — SIGNIFICANT CHANGE UP (ref 0–6)
HCT VFR BLD CALC: 23.8 % — LOW (ref 34.5–45)
HGB BLD-MCNC: 7.9 G/DL — LOW (ref 11.5–15.5)
IMM GRANULOCYTES NFR BLD AUTO: 1.3 % — SIGNIFICANT CHANGE UP (ref 0–1.5)
LYMPHOCYTES # BLD AUTO: 1.44 K/UL — SIGNIFICANT CHANGE UP (ref 1–3.3)
LYMPHOCYTES # BLD AUTO: 20.7 % — SIGNIFICANT CHANGE UP (ref 13–44)
MCHC RBC-ENTMCNC: 30.5 PG — SIGNIFICANT CHANGE UP (ref 27–34)
MCHC RBC-ENTMCNC: 33.2 GM/DL — SIGNIFICANT CHANGE UP (ref 32–36)
MCV RBC AUTO: 91.9 FL — SIGNIFICANT CHANGE UP (ref 80–100)
MONOCYTES # BLD AUTO: 0.53 K/UL — SIGNIFICANT CHANGE UP (ref 0–0.9)
MONOCYTES NFR BLD AUTO: 7.6 % — SIGNIFICANT CHANGE UP (ref 2–14)
NEUTROPHILS # BLD AUTO: 4.84 K/UL — SIGNIFICANT CHANGE UP (ref 1.8–7.4)
NEUTROPHILS NFR BLD AUTO: 69.7 % — SIGNIFICANT CHANGE UP (ref 43–77)
NRBC # BLD: 0 /100 WBCS — SIGNIFICANT CHANGE UP (ref 0–0)
PLATELET # BLD AUTO: 91 K/UL — LOW (ref 150–400)
RBC # BLD: 2.59 M/UL — LOW (ref 3.8–5.2)
RBC # FLD: 13.8 % — SIGNIFICANT CHANGE UP (ref 10.3–14.5)
RH IG SCN BLD-IMP: NEGATIVE — SIGNIFICANT CHANGE UP
WBC # BLD: 6.95 K/UL — SIGNIFICANT CHANGE UP (ref 3.8–10.5)
WBC # FLD AUTO: 6.95 K/UL — SIGNIFICANT CHANGE UP (ref 3.8–10.5)

## 2020-08-31 PROCEDURE — 86901 BLOOD TYPING SEROLOGIC RH(D): CPT

## 2020-08-31 PROCEDURE — 86850 RBC ANTIBODY SCREEN: CPT

## 2020-08-31 PROCEDURE — 99214 OFFICE O/P EST MOD 30 MIN: CPT | Mod: 95

## 2020-08-31 PROCEDURE — 86900 BLOOD TYPING SEROLOGIC ABO: CPT

## 2020-08-31 PROCEDURE — 86923 COMPATIBILITY TEST ELECTRIC: CPT

## 2020-09-01 ENCOUNTER — APPOINTMENT (OUTPATIENT)
Dept: INFUSION THERAPY | Facility: HOSPITAL | Age: 60
End: 2020-09-01

## 2020-09-02 ENCOUNTER — APPOINTMENT (OUTPATIENT)
Dept: HEMATOLOGY ONCOLOGY | Facility: CLINIC | Age: 60
End: 2020-09-02
Payer: COMMERCIAL

## 2020-09-02 ENCOUNTER — APPOINTMENT (OUTPATIENT)
Dept: RADIATION ONCOLOGY | Facility: CLINIC | Age: 60
End: 2020-09-02
Payer: COMMERCIAL

## 2020-09-02 VITALS
BODY MASS INDEX: 24.22 KG/M2 | RESPIRATION RATE: 16 BRPM | DIASTOLIC BLOOD PRESSURE: 70 MMHG | SYSTOLIC BLOOD PRESSURE: 142 MMHG | OXYGEN SATURATION: 99 % | HEART RATE: 68 BPM | WEIGHT: 134.15 LBS

## 2020-09-02 DIAGNOSIS — Z51.89 ENCOUNTER FOR OTHER SPECIFIED AFTERCARE: ICD-10-CM

## 2020-09-02 DIAGNOSIS — R11.2 NAUSEA WITH VOMITING, UNSPECIFIED: ICD-10-CM

## 2020-09-02 PROCEDURE — 99204 OFFICE O/P NEW MOD 45 MIN: CPT | Mod: 95

## 2020-09-02 PROCEDURE — 99213 OFFICE O/P EST LOW 20 MIN: CPT

## 2020-09-02 NOTE — VITALS
[Maximal Pain Intensity: 7/10] : 7/10 [Least Pain Intensity: 7/10] : 7/10 [Pain Description/Quality: ___] : Pain description/quality: [unfilled] [Pain Duration: ___] : Pain duration: [unfilled] [Pain Location: ___] : Pain Location: [unfilled] [Opioid] : opioid [OTC] : OTC [80: Normal activity with effort; some signs or symptoms of disease.] : 80: Normal activity with effort; some signs or symptoms of disease.  [ECOG Performance Status: 1 - Restricted in physically strenuous activity but ambulatory and able to carry out work of a light or sedentary nature] : Performance Status: 1 - Restricted in physically strenuous activity but ambulatory and able to carry out work of a light or sedentary nature, e.g., light house work, office work

## 2020-09-02 NOTE — HISTORY OF PRESENT ILLNESS
[FreeTextEntry1] : Ms. Manriquez is a 61 yo female with  FIGO IB nuclear grade 2 invasive endometrioid adenocarcinoma s/p MELANIE/ BSO and VBT 9/7/18 - 9/17/18 to a dose of 2500 cGy, now with metastatic disease s/p 6 cycles of chemotherapy, presents for re-evaluation of RT. \par \par She developed new left sided abdominal pain around 11/2019. On exam, a mass was palpated under robotic trochar incision suspicious for recurrence. CT CAP 2/15/19 demonstrated new pulmonary nodule (two subcm nodules, new), external iliac lymphadenopathy, soft tissue intraperitoneal implants, and intramuscular enhancing nodules consistent with metastatic disease. US guided bx of abdominal wall mass on 3/6/20 demonstrated metastatic adenocarcinoma from primary endometrial adenocarcinoma. She started Carbo/taxol 3/30/20 and changed to Abraxane after C3, completed 6 cycles of chemotherapy.\par \par Follow-up imaging CT A/P showing extensive peritoneal metastatic disease, mildly decreased with chemo. Multiple enlarged nodes, and large abdominal wall mass measuring 4.3x2.7cm.  Most recently downtrending Hgb. Last Hgb 7.7 on 8/31 (down from 9.8 >8).\par \par Today she continues to report pain to the left abdominal region which varies from dull to sharp. She is currently taking Tylenol 500 mg ES during the day and Tramadol at night. \par

## 2020-09-02 NOTE — PHYSICAL EXAM
[General Appearance - In No Acute Distress] : in no acute distress [Abdomen Soft] : soft [] : no respiratory distress [de-identified] : Tender left upper quadrant with palpable muscle wall mass

## 2020-09-02 NOTE — PHYSICAL EXAM
[General Appearance - Alert] : alert [General Appearance - In No Acute Distress] : in no acute distress [Normal Oral Mucosa] : normal oral mucosa [Affect] : the affect was normal [Oriented To Time, Place, And Person] : oriented to person, place, and time

## 2020-09-08 ENCOUNTER — APPOINTMENT (OUTPATIENT)
Dept: INFUSION THERAPY | Facility: HOSPITAL | Age: 60
End: 2020-09-08

## 2020-09-08 ENCOUNTER — RESULT REVIEW (OUTPATIENT)
Age: 60
End: 2020-09-08

## 2020-09-08 LAB
BASOPHILS # BLD AUTO: 0.02 K/UL — SIGNIFICANT CHANGE UP (ref 0–0.2)
BASOPHILS NFR BLD AUTO: 0.4 % — SIGNIFICANT CHANGE UP (ref 0–2)
EOSINOPHIL # BLD AUTO: 0.02 K/UL — SIGNIFICANT CHANGE UP (ref 0–0.5)
EOSINOPHIL NFR BLD AUTO: 0.4 % — SIGNIFICANT CHANGE UP (ref 0–6)
HCT VFR BLD CALC: 33.2 % — LOW (ref 34.5–45)
HGB BLD-MCNC: 11.5 G/DL — SIGNIFICANT CHANGE UP (ref 11.5–15.5)
IMM GRANULOCYTES NFR BLD AUTO: 0.6 % — SIGNIFICANT CHANGE UP (ref 0–1.5)
LYMPHOCYTES # BLD AUTO: 1.54 K/UL — SIGNIFICANT CHANGE UP (ref 1–3.3)
LYMPHOCYTES # BLD AUTO: 28.4 % — SIGNIFICANT CHANGE UP (ref 13–44)
MCHC RBC-ENTMCNC: 31 PG — SIGNIFICANT CHANGE UP (ref 27–34)
MCHC RBC-ENTMCNC: 34.9 GM/DL — SIGNIFICANT CHANGE UP (ref 32–36)
MCV RBC AUTO: 88.8 FL — SIGNIFICANT CHANGE UP (ref 80–100)
MONOCYTES # BLD AUTO: 0.84 K/UL — SIGNIFICANT CHANGE UP (ref 0–0.9)
MONOCYTES NFR BLD AUTO: 15.5 % — HIGH (ref 2–14)
NEUTROPHILS # BLD AUTO: 2.97 K/UL — SIGNIFICANT CHANGE UP (ref 1.8–7.4)
NEUTROPHILS NFR BLD AUTO: 54.7 % — SIGNIFICANT CHANGE UP (ref 43–77)
NRBC # BLD: 0 /100 WBCS — SIGNIFICANT CHANGE UP (ref 0–0)
PLATELET # BLD AUTO: 313 K/UL — SIGNIFICANT CHANGE UP (ref 150–400)
RBC # BLD: 3.74 M/UL — LOW (ref 3.8–5.2)
RBC # FLD: 13.8 % — SIGNIFICANT CHANGE UP (ref 10.3–14.5)
WBC # BLD: 5.42 K/UL — SIGNIFICANT CHANGE UP (ref 3.8–10.5)
WBC # FLD AUTO: 5.42 K/UL — SIGNIFICANT CHANGE UP (ref 3.8–10.5)

## 2020-09-08 RX ORDER — ONDANSETRON 4 MG/1
4 TABLET ORAL EVERY 8 HOURS
Qty: 30 | Refills: 0 | Status: ACTIVE | COMMUNITY
Start: 2020-03-23 | End: 1900-01-01

## 2020-09-08 RX ORDER — PROCHLORPERAZINE MALEATE 10 MG/1
10 TABLET ORAL EVERY 6 HOURS
Qty: 30 | Refills: 1 | Status: ACTIVE | COMMUNITY
Start: 2020-03-23 | End: 1900-01-01

## 2020-09-08 NOTE — ASSESSMENT
[FreeTextEntry1] : 60yoF with:\par \par 1. Endometrial cancer - On Carbo/Abraxane. Med Onc, Rad Onc follow up. \par \par 2. Neoplasm-related pain - Medical cannabis certification completed today. Provided cannabis education, overview of state program, and discussed adverse effects in detail. Counseled that vaporized cannabis is not the preferred route of administration due to the fact that both short-term and long-term risks associated with vaporizing oils are not yet fully understood. Recommend starting with 1:1 THC:CBD formulation at low dose of THC (~2mg/dose).\par May c/w PRN Tylenol.\par \par 3. CIPN - Discussed how medicinal cannabis may be of benefit. \par \par 4. Encounter for Palliative Care - Emotional support provided.\par \par Follow up in 1 month, call sooner with questions or issues.

## 2020-09-08 NOTE — HISTORY OF PRESENT ILLNESS
[Medical Office: ([facility])___] : at the medical office located in  [Home] : at home, [unfilled] , at the time of the visit. [Verbal consent obtained from patient] : the patient, [unfilled] [Spouse] : spouse [FreeTextEntry1] : 60yoF with endometrial cancer presents for initial palliative care visit, referred by Dr. Calvert. \par \par Patient initially presented to her GYN (Dr. Powell, 5/17/18) for postmenopausal bleeding that began shortly after Lap cholecystectomy (4/9/18).  She underwent endometrial biopsy with Dr. English at [place] on 5/21/18. Pathology was significant for endometrial adenocarcinoma. She subsequently underwent comprehensive surgical staging by Dr. Fuentes including RTLH, BSO, SLN, PPALND on 7/19/18. Postoperatively she did well and received vaginal brachytherapy 2500 cGy in 5 fractions over 10 days completed 9/17/18 with Dr. Littlejohn of radiation oncology. \par \par She developed new left sided abdominal pain around 11/2019. On exam, a mass was palpated under robotic trochar incision suspicious for recurrence. CT CAP 2/15/19 demonstrated new pulmonary nodule (two subcm nodules, new), external iliac lymphadenopathy, soft tissue intraperitoneal implants, and intramuscular enhancing nodules consistent with metastatic disease. US guided bx of abdominal wall mass on 3/6/20 demonstrated metastatic adenocarcinoma from primary endometrial adenocarcinoma. She started Carbo/taxol 3/30/20 and changed to Abraxane after C3, completed 6 cycles of chemotherapy. Follow-up imaging showed extensive peritoneal metastatic disease, mildly decreased with chemo. Multiple enlarged nodes, and large abdominal wall mass measuring 4.3x2.7cm. \par \par Today she continues to report pain to the left abdominal region which varies from dull to sharp. She is currently taking Tylenol 500 mg ES during the day and Tramadol at night. \par \par Patient reports pain in her left lower abdomen that shoots across her abdomen, comes on intermittently without warning. The pain is cramping, sharp. she has been using Tylenol 500mg three times daily and a 50mg Tramadol dose at night. She doesn't like to use the Tramadol because it makes her drowsy.  She is looking for an alternative like medical cannabis \par \par ROS:\par +CIPN in toes\par +constipation post-chemotherapy\par +occasional nausea \par All other ROS non-contributory\par \par Patient is , lives with her  and their 17 year old daughter. \par She works as an . \par \par [employer] Ref#: [id number]

## 2020-09-08 NOTE — DATA REVIEWED
[FreeTextEntry1] : CT Abd/Pelvis (8/2020) - \par LOWER CHEST: Within normal limits. \par \par LIVER: Within normal limits. \par BILE DUCTS: Normal caliber. \par GALLBLADDER: Cholecystectomy. \par SPLEEN: Stable hypervascular lesion of the spleen measuring 1.3 cm. A hemangioma or hamartoma is considered. Accessory splenic nodules in the left upper quadrant stable \par PANCREAS: Within normal limits. \par ADRENALS: Within normal limits. \par KIDNEYS/URETERS: Within normal limits. \par \par BLADDER: Within normal limits. \par REPRODUCTIVE ORGANS: Hysterectomy. \par \par BOWEL: No bowel obstruction. Appendix is not visualized. No evidence of inflammation in the pericecal region. \par PERITONEUM: No ascites. Extensive peritoneal metastatic disease. For reference: \par Left lower quadrant/paracolic gutter (2: 77) is slightly smaller. It currently measures 3.2 x 2 cm where it previously measured 3.4 x 2.2 cm. \par Right lower quadrant (2:79) is smaller. It currently measures 2 x 1.5 cm where it previously measured 3.0 x 2.1 cm. \par VESSELS: Within normal limits. \par \par RETROPERITONEUM/LYMPH NODES: Bilateral obturator adenopathy is unchanged. A right obturator node measures 2.1 x 1.3 cm. The left obturator node measures 2.4 x 2 cm. These are measured on series 2 image 88. A right common iliac node is smaller. It measures 2.6 x 1.7 cm (2:70) previously measuring 3.2 x 2.1 cm. \par \par ABDOMINAL WALL: Abdominal wall mass with the largest in the left rectus musculature (2: 59) measuring 4.3 x 2.7 cm previously measuring 5.0 x 3.0 cm. \par \par BONES: Mild degenerative changes of the thoracolumbar junction. \par \par IMPRESSION: \par Mild interval improvement in peritoneal metastatic disease, abdominal wall mass and in right common iliac adenopathy. \par Unchanged bilateral obturator nodes.

## 2020-09-09 ENCOUNTER — APPOINTMENT (OUTPATIENT)
Dept: INFUSION THERAPY | Facility: HOSPITAL | Age: 60
End: 2020-09-09

## 2020-09-18 ENCOUNTER — RESULT REVIEW (OUTPATIENT)
Age: 60
End: 2020-09-18

## 2020-09-18 ENCOUNTER — APPOINTMENT (OUTPATIENT)
Dept: HEMATOLOGY ONCOLOGY | Facility: CLINIC | Age: 60
End: 2020-09-18

## 2020-09-18 ENCOUNTER — OUTPATIENT (OUTPATIENT)
Dept: OUTPATIENT SERVICES | Facility: HOSPITAL | Age: 60
LOS: 1 days | Discharge: HOME | End: 2020-09-18

## 2020-09-18 DIAGNOSIS — Z98.890 OTHER SPECIFIED POSTPROCEDURAL STATES: Chronic | ICD-10-CM

## 2020-09-18 DIAGNOSIS — Z90.49 ACQUIRED ABSENCE OF OTHER SPECIFIED PARTS OF DIGESTIVE TRACT: Chronic | ICD-10-CM

## 2020-09-18 DIAGNOSIS — Z98.891 HISTORY OF UTERINE SCAR FROM PREVIOUS SURGERY: Chronic | ICD-10-CM

## 2020-09-18 DIAGNOSIS — C54.1 MALIGNANT NEOPLASM OF ENDOMETRIUM: ICD-10-CM

## 2020-09-18 LAB
BASOPHILS # BLD AUTO: 0 K/UL — SIGNIFICANT CHANGE UP (ref 0–0.2)
BASOPHILS NFR BLD AUTO: 0 % — SIGNIFICANT CHANGE UP (ref 0–2)
EOSINOPHIL # BLD AUTO: 0 K/UL — SIGNIFICANT CHANGE UP (ref 0–0.5)
EOSINOPHIL NFR BLD AUTO: 0 % — SIGNIFICANT CHANGE UP (ref 0–6)
HCT VFR BLD CALC: 31.8 % — LOW (ref 34.5–45)
HGB BLD-MCNC: 10.5 G/DL — LOW (ref 11.5–15.5)
LYMPHOCYTES # BLD AUTO: 14 % — SIGNIFICANT CHANGE UP (ref 13–44)
LYMPHOCYTES # BLD AUTO: 2.01 K/UL — SIGNIFICANT CHANGE UP (ref 1–3.3)
MCHC RBC-ENTMCNC: 30.4 PG — SIGNIFICANT CHANGE UP (ref 27–34)
MCHC RBC-ENTMCNC: 33 G/DL — SIGNIFICANT CHANGE UP (ref 32–36)
MCV RBC AUTO: 92.2 FL — SIGNIFICANT CHANGE UP (ref 80–100)
MONOCYTES # BLD AUTO: 1.01 K/UL — HIGH (ref 0–0.9)
MONOCYTES NFR BLD AUTO: 7 % — SIGNIFICANT CHANGE UP (ref 2–14)
NEUTROPHILS # BLD AUTO: 11.37 K/UL — HIGH (ref 1.8–7.4)
NEUTROPHILS NFR BLD AUTO: 79 % — HIGH (ref 43–77)
NRBC # BLD: 0 /100 — SIGNIFICANT CHANGE UP (ref 0–0)
NRBC # BLD: SIGNIFICANT CHANGE UP /100 WBCS (ref 0–0)
PLAT MORPH BLD: NORMAL — SIGNIFICANT CHANGE UP
PLATELET # BLD AUTO: 152 K/UL — SIGNIFICANT CHANGE UP (ref 150–400)
RBC # BLD: 3.45 M/UL — LOW (ref 3.8–5.2)
RBC # FLD: 13.7 % — SIGNIFICANT CHANGE UP (ref 10.3–14.5)
RBC BLD AUTO: SIGNIFICANT CHANGE UP
WBC # BLD: 14.39 K/UL — HIGH (ref 3.8–10.5)
WBC # FLD AUTO: 14.39 K/UL — HIGH (ref 3.8–10.5)

## 2020-09-20 LAB
ALBUMIN SERPL ELPH-MCNC: 4.7 G/DL
ALP BLD-CCNC: 129 U/L
ALT SERPL-CCNC: 33 U/L
ANION GAP SERPL CALC-SCNC: 13 MMOL/L
AST SERPL-CCNC: 20 U/L
BILIRUB SERPL-MCNC: 0.2 MG/DL
BUN SERPL-MCNC: 15 MG/DL
CALCIUM SERPL-MCNC: 9.4 MG/DL
CANCER AG125 SERPL-ACNC: 10 U/ML
CEA SERPL-MCNC: 2.7 NG/ML
CHLORIDE SERPL-SCNC: 108 MMOL/L
CO2 SERPL-SCNC: 23 MMOL/L
CREAT SERPL-MCNC: 0.77 MG/DL
GLUCOSE SERPL-MCNC: 88 MG/DL
MAGNESIUM SERPL-MCNC: 1.7 MG/DL
POTASSIUM SERPL-SCNC: 4.3 MMOL/L
PROT SERPL-MCNC: 6.5 G/DL
SODIUM SERPL-SCNC: 144 MMOL/L

## 2020-09-21 ENCOUNTER — APPOINTMENT (OUTPATIENT)
Dept: HEMATOLOGY ONCOLOGY | Facility: CLINIC | Age: 60
End: 2020-09-21

## 2020-09-21 ENCOUNTER — APPOINTMENT (OUTPATIENT)
Dept: HEMATOLOGY ONCOLOGY | Facility: CLINIC | Age: 60
End: 2020-09-21
Payer: COMMERCIAL

## 2020-09-21 PROCEDURE — 99202 OFFICE O/P NEW SF 15 MIN: CPT | Mod: 95

## 2020-09-21 RX ORDER — GABAPENTIN 100 MG/1
100 CAPSULE ORAL
Qty: 60 | Refills: 1 | Status: DISCONTINUED | COMMUNITY
Start: 2020-09-21 | End: 2020-09-21

## 2020-09-22 NOTE — ASSESSMENT
[FreeTextEntry1] : Ms. Manriquez is a 59 y/o postmenopausal F with history of stage IB endometrioid endometrial adenocarcinoma, grade 2 +LVSI, 17/20mm muscle invasion s/p RTLH/BSO, SLN, PPALND in 7/2018 followed by adjuvant vaginal brachytherapy completed in 11/2018, who was PHUONG until February 2020 when she was found to have recurrent metastatic disease, now biopsy-proven from new abdominal wall mass.\par \par S/p C7 carbo/abraxane on 8/17/20. She has previously had a taxol reaction that included facial flushing, chest discomfort and SOB with C3 and switched to abraxane. Continues to tolerate chemotherapy without significant toxicities, however increased neuropathy, G1 of her extremities. \par \par We reviewed the interval CT scans from 8/4/20 that shows overall stable disease, with mild improvement of the abdominal wall mass and peritoneal disease. No new lesions identified. \par \par The patient and and her  inquired about surgical resection, and I explained that this was an unlikely option given her extent of disease and recurrent nature of her disease after her initial surgery with Dr. Fuentes. The patient and her  would like to discuss further with Dr. Fuentes but are in agreement with the current plan for continued chemotherapy. She would also like a referral made to see Dr. Littlejohn for consideration of RT options.\par \par Plan:\par -Interval bloodwork scheduled \par -proceed with carbo/abraxane with reduction of doses given increased neuropathy and cytopenias\par -plan for 2 additional cycles with reevaluation CT scans \par -palliative care referral made for pain management and symptoms, and consideration of medical marijuana \par -referral made for radiation oncology, for consideration of palliative RT \par -f/u gyn onc Dr. Fuentes\par -RTC 3 weeks \par \par The patient is in agreement with the plan of care. All of her questions and concerns were addressed. \par \par

## 2020-09-22 NOTE — HISTORY OF PRESENT ILLNESS
[de-identified] : Referred by Dr. Fuentes\par Telephone visit:\par Due to the COVID19 pandemic, this interview was conducted over the telephone, with the patient and her . I was not able to do a physical exam during this interview. 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 She is being referred to medical oncology for treatment considerations for recurrent metastatic disease.\par \par Ms. Manriquez initially presented to her GYN (Dr. Powell, 5/17/18) for postmenopausal bleeding that began shortly after Lap cholecystectomy (4/9/18). She underwent a Pelvic US that showed 10.8 x 6.7 x 6.1 cm uterus with fibroids. She underwent endometrial biopsy with Dr. English at [place] on 5/21/18. Pathology was significant for endometrial adenocarcinoma, endometroid type, FIGO grade 1 nuclear grade 2 with foci of squamous differentiation. \par \par MRI of the pelvis on 5/24/18 showed a large mass involving the lower uterine segment and body. There were no pathologically enlarged lymph nodes but there was a large heterogeneous lesion involving the left sacrum suspicious for osseous metastatic disease. She underwent biopsy of the left sacral lesion which was read as consistent with hemangioma to rule out metastatic disease.\par \par She subsequently underwent comprehensive surgical staging by Dr. Fuentes including RTLH, BSO, SLN, PPALND on 7/19/18. Pathology for significant for FIGO grade 1 nuclear grade 2 invasive adenocarcinoma with LVSI and 17/20 mm muscle invasion, stage IB. There was no cervical stromal invasion, lower uterine segment invasion, or uterine serosal involvement. All the sampled left pelvic, right pelvic and para-aortic nodes were free of cancer. \par \par Postoperatively she did well and received vaginal brachytherapy 2500 cGy in 5 fractions over 10 days completed 9/17/18 with Dr. Littlejohn of radiation oncology. \par \par She felt well without any concerning symptoms and was PHUONG under surveillance until she reported new left sided abdominal pain and a firmness. In retrospect, she thinks that she felt something different in her L abdomen/pelvis for the past 1-2 months. She followed up with Dr. Fuentes who palpated a 3cm left sided mass under robotic trochar incision suspicious for recurrence. \par \par CT CAP 2/15/19 demonstrated:\par New pulmonary nodule (two subcm nodules, new), external iliac lymphadenopathy, soft tissue intraperitoneal implants, and intramuscular enhancing nodules consistent with metastatic disease. \par \par PERITONEUM: Trace amount of pelvic fluid. There multiple new soft tissue masses, for instance \par adjacent to the descending colon along the left paracolic gutter measuring 4.6 x 3.1 x 2.2 cm and \par within the right lower quadrant measuring 3.6 x 3.4 x 1.8 cm. \par VESSELS: Within normal limits. \par RETROPERITONEUM/LYMPH NODES: Bilateral new enlarged external iliac lymph nodes (2:133) \par measuring up to 2.2 x 1.7 cm on the left \par ABDOMINAL WALL: Within the left rectus muscle there is an enhancing mass measuring 5.0 x 2.8 x \par 5.7 cm, with an additional nodule within the anterior subcutaneous tissues. There is an additional 2 \par cm mass abutting the inferior left rectus muscle. \par BONES: Within normal limits. \par \par She underwent US-guided abdominal wall mass biopsy on 3/6 by IR. Pathology review from the biopsy demonstrated metastatic adenocarcinoma, c/w metastasis from patient's known history of endometroid endometrial adenocarcinoma. \par \par Addendum\par Addendum issued to report results of additional immunostains\par P53:Wild type; Ki-67 is mildly elevated, WT1 is negative\par The cytomorphology in conjunction with the immunophenotype is consistent with metastases from a primary endometrioid endometrial adenocarcinoma.\par \par She has been referred to medical oncology to discuss systemic treatment for her recurrent endometrial cancer. She feels very well overall, although she continues to feel the intermittent L sided abdominal discomfort and pulling sensation. She is working full time and lives with her . She has a daughter. She has a good appetite and denies any weight changes.  [de-identified] : The patient is here for follow up, currently on chemotherapy with carbo/taxol, last dose 8/17/20.\par Since her last visit she feels very well overall with good appetite and energy. She has been busy working from home, caring for her dog and managing all of her ADLs without issues. She has grade 1 fatigue during the week following chemotherapy that improves to baseline. She has had increased neuropathic symptoms however, with increased numbness and "electric like" sensations occasionally. She would like to consider medical marijuana and have an appt to see palliative care. She is taking Tylenol and Tramadol for her pain, which has been adequate for abdominal pain and has been trying to cut back as Tramadol makes her sleepy. She would like to speak with Dr. Fuentes about possible surgical options and Dr. Littlejohn about RT options for her cancer, and states that she "can't be on chemotherapy forever."  She denies fevers, chills, nausea, vomiting, abdominal pain, urinary symptoms, vaginal bleeding/discharge, CP or SOB.  [Home] : at home, [unfilled] , at the time of the visit. [Medical Office: ([facility])___] : at ~his/her~ medical office located in [place] [Spouse] : spouse [Patient] : the patient [Self] : self

## 2020-09-22 NOTE — REVIEW OF SYSTEMS
[Negative] : Musculoskeletal [Neuropathy] : neuropathy [FreeTextEntry2] : grade 1 neuropathy of fingers and toes b/l, improved after this cycle  [de-identified] : mild constipation, occasional R sided/umbilical abdominal discomfort

## 2020-09-22 NOTE — PHYSICAL EXAM
[Restricted in physically strenuous activity but ambulatory and able to carry out work of a light or sedentary nature] : Status 1- Restricted in physically strenuous activity but ambulatory and able to carry out work of a light or sedentary nature, e.g., light house work, office work [Normal] : affect appropriate [de-identified] : +alopecia  [de-identified] : Normal respiratory effort and rate

## 2020-09-23 ENCOUNTER — APPOINTMENT (OUTPATIENT)
Dept: GYNECOLOGIC ONCOLOGY | Facility: CLINIC | Age: 60
End: 2020-09-23
Payer: COMMERCIAL

## 2020-09-23 VITALS
DIASTOLIC BLOOD PRESSURE: 78 MMHG | SYSTOLIC BLOOD PRESSURE: 154 MMHG | WEIGHT: 133 LBS | HEART RATE: 82 BPM | BODY MASS INDEX: 24.01 KG/M2

## 2020-09-23 DIAGNOSIS — R22.2 LOCALIZED SWELLING, MASS AND LUMP, TRUNK: ICD-10-CM

## 2020-09-23 PROCEDURE — 99215 OFFICE O/P EST HI 40 MIN: CPT

## 2020-09-28 ENCOUNTER — RESULT REVIEW (OUTPATIENT)
Age: 60
End: 2020-09-28

## 2020-09-28 ENCOUNTER — APPOINTMENT (OUTPATIENT)
Dept: INFUSION THERAPY | Facility: HOSPITAL | Age: 60
End: 2020-09-28

## 2020-09-28 DIAGNOSIS — Z51.11 ENCOUNTER FOR ANTINEOPLASTIC CHEMOTHERAPY: ICD-10-CM

## 2020-09-28 DIAGNOSIS — R11.2 NAUSEA WITH VOMITING, UNSPECIFIED: ICD-10-CM

## 2020-09-28 LAB
BASOPHILS # BLD AUTO: 0.02 K/UL — SIGNIFICANT CHANGE UP (ref 0–0.2)
BASOPHILS NFR BLD AUTO: 0.3 % — SIGNIFICANT CHANGE UP (ref 0–2)
EOSINOPHIL # BLD AUTO: 0.02 K/UL — SIGNIFICANT CHANGE UP (ref 0–0.5)
EOSINOPHIL NFR BLD AUTO: 0.3 % — SIGNIFICANT CHANGE UP (ref 0–6)
HCT VFR BLD CALC: 28 % — LOW (ref 34.5–45)
HGB BLD-MCNC: 9.7 G/DL — LOW (ref 11.5–15.5)
IMM GRANULOCYTES NFR BLD AUTO: 0.7 % — SIGNIFICANT CHANGE UP (ref 0–1.5)
LYMPHOCYTES # BLD AUTO: 1.46 K/UL — SIGNIFICANT CHANGE UP (ref 1–3.3)
LYMPHOCYTES # BLD AUTO: 24.2 % — SIGNIFICANT CHANGE UP (ref 13–44)
MCHC RBC-ENTMCNC: 30.9 PG — SIGNIFICANT CHANGE UP (ref 27–34)
MCHC RBC-ENTMCNC: 34.6 G/DL — SIGNIFICANT CHANGE UP (ref 32–36)
MCV RBC AUTO: 89.2 FL — SIGNIFICANT CHANGE UP (ref 80–100)
MONOCYTES # BLD AUTO: 0.74 K/UL — SIGNIFICANT CHANGE UP (ref 0–0.9)
MONOCYTES NFR BLD AUTO: 12.3 % — SIGNIFICANT CHANGE UP (ref 2–14)
NEUTROPHILS # BLD AUTO: 3.75 K/UL — SIGNIFICANT CHANGE UP (ref 1.8–7.4)
NEUTROPHILS NFR BLD AUTO: 62.2 % — SIGNIFICANT CHANGE UP (ref 43–77)
NRBC # BLD: 0 /100 WBCS — SIGNIFICANT CHANGE UP (ref 0–0)
PLATELET # BLD AUTO: 181 K/UL — SIGNIFICANT CHANGE UP (ref 150–400)
RBC # BLD: 3.14 M/UL — LOW (ref 3.8–5.2)
RBC # FLD: 14.1 % — SIGNIFICANT CHANGE UP (ref 10.3–14.5)
WBC # BLD: 6.03 K/UL — SIGNIFICANT CHANGE UP (ref 3.8–10.5)
WBC # FLD AUTO: 6.03 K/UL — SIGNIFICANT CHANGE UP (ref 3.8–10.5)

## 2020-09-29 ENCOUNTER — APPOINTMENT (OUTPATIENT)
Dept: INFUSION THERAPY | Facility: HOSPITAL | Age: 60
End: 2020-09-29

## 2020-09-29 DIAGNOSIS — Z51.89 ENCOUNTER FOR OTHER SPECIFIED AFTERCARE: ICD-10-CM

## 2020-10-06 NOTE — HISTORY OF PRESENT ILLNESS
[Home] : at home, [unfilled] , at the time of the visit. [Medical Office: ([facility])___] : at ~his/her~ medical office located in [place] [Spouse] : spouse [Patient] : the patient [Self] : self [de-identified] : Referred by Dr. Fuentes\par Telephone visit:\par Due to the COVID19 pandemic, this interview was conducted over the telephone, with the patient and her . I was not able to do a physical exam during this interview. 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 She is being referred to medical oncology for treatment considerations for recurrent metastatic disease.\par \par Ms. Manriquez initially presented to her GYN (Dr. Powell, 5/17/18) for postmenopausal bleeding that began shortly after Lap cholecystectomy (4/9/18). She underwent a Pelvic US that showed 10.8 x 6.7 x 6.1 cm uterus with fibroids. She underwent endometrial biopsy with Dr. English at [facility] on 5/21/18. Pathology was significant for endometrial adenocarcinoma, endometroid type, FIGO grade 1 nuclear grade 2 with foci of squamous differentiation. \par \par MRI of the pelvis on 5/24/18 showed a large mass involving the lower uterine segment and body. There were no pathologically enlarged lymph nodes but there was a large heterogeneous lesion involving the left sacrum suspicious for osseous metastatic disease. She underwent biopsy of the left sacral lesion which was read as consistent with hemangioma to rule out metastatic disease.\par \par She subsequently underwent comprehensive surgical staging by Dr. Fuentes including RTLH, BSO, SLN, PPALND on 7/19/18. Pathology for significant for FIGO grade 1 nuclear grade 2 invasive adenocarcinoma with LVSI and 17/20 mm muscle invasion, stage IB. There was no cervical stromal invasion, lower uterine segment invasion, or uterine serosal involvement. All the sampled left pelvic, right pelvic and para-aortic nodes were free of cancer. \par \par Postoperatively she did well and received vaginal brachytherapy 2500 cGy in 5 fractions over 10 days completed 9/17/18 with Dr. Littlejohn of radiation oncology. \par \par She felt well without any concerning symptoms and was PHUONG under surveillance until she reported new left sided abdominal pain and a firmness. In retrospect, she thinks that she felt something different in her L abdomen/pelvis for the past 1-2 months. She followed up with Dr. Fuentes who palpated a 3cm left sided mass under robotic trochar incision suspicious for recurrence. \par \par CT CAP 2/15/19 demonstrated:\par New pulmonary nodule (two subcm nodules, new), external iliac lymphadenopathy, soft tissue intraperitoneal implants, and intramuscular enhancing nodules consistent with metastatic disease. \par \par PERITONEUM: Trace amount of pelvic fluid. There multiple new soft tissue masses, for instance \par adjacent to the descending colon along the left paracolic gutter measuring 4.6 x 3.1 x 2.2 cm and \par within the right lower quadrant measuring 3.6 x 3.4 x 1.8 cm. \par VESSELS: Within normal limits. \par RETROPERITONEUM/LYMPH NODES: Bilateral new enlarged external iliac lymph nodes (2:133) \par measuring up to 2.2 x 1.7 cm on the left \par ABDOMINAL WALL: Within the left rectus muscle there is an enhancing mass measuring 5.0 x 2.8 x \par 5.7 cm, with an additional nodule within the anterior subcutaneous tissues. There is an additional 2 \par cm mass abutting the inferior left rectus muscle. \par BONES: Within normal limits. \par \par She underwent US-guided abdominal wall mass biopsy on 3/6 by IR. Pathology review from the biopsy demonstrated metastatic adenocarcinoma, c/w metastasis from patient's known history of endometroid endometrial adenocarcinoma. \par \par Addendum\par Addendum issued to report results of additional immunostains\par P53:Wild type; Ki-67 is mildly elevated, WT1 is negative\par The cytomorphology in conjunction with the immunophenotype is consistent with metastases from a primary endometrioid endometrial adenocarcinoma.\par \par She has been referred to medical oncology to discuss systemic treatment for her recurrent endometrial cancer. She feels very well overall, although she continues to feel the intermittent L sided abdominal discomfort and pulling sensation. She is working full time and lives with her . She has a daughter. She has a good appetite and denies any weight changes.  [de-identified] : The patient is here for follow up, currently on chemotherapy with carbo/taxol, last dose 9/18/20.\par Since her last visit she continues to feel well overall with good appetite and energy. She has been busy working from home, caring for her dog and managing all of her ADLs without issues. She has grade 1 fatigue which is stable. She has had increased neuropathic symptoms, increased numbness and "electric like" sensations occasionally. These symptoms were less severe with reduced doses of chemotherapy with this cycle. She has seen palliative care and planning to start medical marijuana. Currently she is taking Tylenol and Tramadol for her pain, but is not really using more than 1-2x daily, which has been adequate for abdominal pain. She is seeing Dr. Fuentes this week to discuss possible surgical options and Dr. Littlejohn about RT options. She denies fevers, chills, nausea, vomiting, abdominal pain, urinary symptoms, vaginal bleeding/discharge, CP or SOB.

## 2020-10-06 NOTE — ASSESSMENT
[FreeTextEntry1] : Ms. Manriquez is a 61 y/o postmenopausal F with history of stage IB endometrioid endometrial adenocarcinoma, grade 2 +LVSI, 17/20mm muscle invasion s/p RTLH/BSO, SLN, PPALND in 7/2018 followed by adjuvant vaginal brachytherapy completed in 11/2018, who was PHUONG until February 2020 when she was found to have recurrent metastatic disease, now biopsy-proven from new abdominal wall mass.\par \par S/p carbo/abraxane on 9/8/20. 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 Symptoms improved with reduction of chemotherapy doses.\par \par Plan:\par -Interval bloodwork reviewed\par -proceed with carbo/abraxane with reduction of doses given increased neuropathy and cytopenias\par -plan for interval CT scan after 1 additional chemotherapy cycle\par -f/u palliative care for management of pain and neuropathy, plan to try medical marijuana \par -f/u gyn onc Dr. Fuentes this week \par -RTC 3 weeks \par \par The patient is in agreement with the plan of care. All of her questions and concerns were addressed. \par \par

## 2020-10-06 NOTE — REVIEW OF SYSTEMS
[Negative] : Musculoskeletal [Neuropathy] : neuropathy [FreeTextEntry2] : grade 1 neuropathy of fingers and toes b/l, improved after this cycle  [de-identified] : mild constipation, occasional R sided/umbilical abdominal discomfort

## 2020-10-06 NOTE — PHYSICAL EXAM
[Restricted in physically strenuous activity but ambulatory and able to carry out work of a light or sedentary nature] : Status 1- Restricted in physically strenuous activity but ambulatory and able to carry out work of a light or sedentary nature, e.g., light house work, office work [Normal] : affect appropriate [de-identified] : +alopecia  [de-identified] : Normal respiratory effort and rate

## 2020-10-07 ENCOUNTER — APPOINTMENT (OUTPATIENT)
Dept: CT IMAGING | Facility: IMAGING CENTER | Age: 60
End: 2020-10-07
Payer: COMMERCIAL

## 2020-10-07 ENCOUNTER — OUTPATIENT (OUTPATIENT)
Dept: OUTPATIENT SERVICES | Facility: HOSPITAL | Age: 60
LOS: 1 days | End: 2020-10-07
Payer: COMMERCIAL

## 2020-10-07 DIAGNOSIS — Z90.49 ACQUIRED ABSENCE OF OTHER SPECIFIED PARTS OF DIGESTIVE TRACT: Chronic | ICD-10-CM

## 2020-10-07 DIAGNOSIS — Z98.890 OTHER SPECIFIED POSTPROCEDURAL STATES: Chronic | ICD-10-CM

## 2020-10-07 DIAGNOSIS — Z98.891 HISTORY OF UTERINE SCAR FROM PREVIOUS SURGERY: Chronic | ICD-10-CM

## 2020-10-07 DIAGNOSIS — C54.1 MALIGNANT NEOPLASM OF ENDOMETRIUM: ICD-10-CM

## 2020-10-07 PROCEDURE — 74177 CT ABD & PELVIS W/CONTRAST: CPT

## 2020-10-07 PROCEDURE — 74177 CT ABD & PELVIS W/CONTRAST: CPT | Mod: 26

## 2020-10-09 ENCOUNTER — APPOINTMENT (OUTPATIENT)
Dept: HEMATOLOGY ONCOLOGY | Facility: CLINIC | Age: 60
End: 2020-10-09

## 2020-10-09 ENCOUNTER — RESULT REVIEW (OUTPATIENT)
Age: 60
End: 2020-10-09

## 2020-10-09 LAB
BASOPHILS # BLD AUTO: 0 K/UL — SIGNIFICANT CHANGE UP (ref 0–0.2)
BASOPHILS NFR BLD AUTO: 0 % — SIGNIFICANT CHANGE UP (ref 0–2)
EOSINOPHIL # BLD AUTO: 0 K/UL — SIGNIFICANT CHANGE UP (ref 0–0.5)
EOSINOPHIL NFR BLD AUTO: 0 % — SIGNIFICANT CHANGE UP (ref 0–6)
HCT VFR BLD CALC: 29.4 % — LOW (ref 34.5–45)
HGB BLD-MCNC: 9.6 G/DL — LOW (ref 11.5–15.5)
LYMPHOCYTES # BLD AUTO: 1.47 K/UL — SIGNIFICANT CHANGE UP (ref 1–3.3)
LYMPHOCYTES # BLD AUTO: 16 % — SIGNIFICANT CHANGE UP (ref 13–44)
MCHC RBC-ENTMCNC: 30.5 PG — SIGNIFICANT CHANGE UP (ref 27–34)
MCHC RBC-ENTMCNC: 32.7 G/DL — SIGNIFICANT CHANGE UP (ref 32–36)
MCV RBC AUTO: 93.3 FL — SIGNIFICANT CHANGE UP (ref 80–100)
MONOCYTES # BLD AUTO: 0.55 K/UL — SIGNIFICANT CHANGE UP (ref 0–0.9)
MONOCYTES NFR BLD AUTO: 6 % — SIGNIFICANT CHANGE UP (ref 2–14)
NEUTROPHILS # BLD AUTO: 7.18 K/UL — SIGNIFICANT CHANGE UP (ref 1.8–7.4)
NEUTROPHILS NFR BLD AUTO: 78 % — HIGH (ref 43–77)
NRBC # BLD: 0 /100 — SIGNIFICANT CHANGE UP (ref 0–0)
NRBC # BLD: SIGNIFICANT CHANGE UP /100 WBCS (ref 0–0)
PLAT MORPH BLD: NORMAL — SIGNIFICANT CHANGE UP
PLATELET # BLD AUTO: 153 K/UL — SIGNIFICANT CHANGE UP (ref 150–400)
RBC # BLD: 3.15 M/UL — LOW (ref 3.8–5.2)
RBC # FLD: 14.7 % — HIGH (ref 10.3–14.5)
RBC BLD AUTO: SIGNIFICANT CHANGE UP
WBC # BLD: 9.2 K/UL — SIGNIFICANT CHANGE UP (ref 3.8–10.5)
WBC # FLD AUTO: 9.2 K/UL — SIGNIFICANT CHANGE UP (ref 3.8–10.5)

## 2020-10-12 ENCOUNTER — APPOINTMENT (OUTPATIENT)
Dept: HEMATOLOGY ONCOLOGY | Facility: CLINIC | Age: 60
End: 2020-10-12
Payer: COMMERCIAL

## 2020-10-12 DIAGNOSIS — G89.3 NEOPLASM RELATED PAIN (ACUTE) (CHRONIC): ICD-10-CM

## 2020-10-12 DIAGNOSIS — Z51.5 ENCOUNTER FOR PALLIATIVE CARE: ICD-10-CM

## 2020-10-12 PROCEDURE — 99213 OFFICE O/P EST LOW 20 MIN: CPT | Mod: 95

## 2020-10-12 NOTE — PHYSICAL EXAM
[General Appearance - Alert] : alert [General Appearance - In No Acute Distress] : in no acute distress [Normal Oral Mucosa] : normal oral mucosa [Oriented To Time, Place, And Person] : oriented to person, place, and time [Affect] : the affect was normal

## 2020-10-14 ENCOUNTER — APPOINTMENT (OUTPATIENT)
Dept: HEMATOLOGY ONCOLOGY | Facility: CLINIC | Age: 60
End: 2020-10-14

## 2020-10-14 ENCOUNTER — APPOINTMENT (OUTPATIENT)
Dept: HEMATOLOGY ONCOLOGY | Facility: CLINIC | Age: 60
End: 2020-10-14
Payer: COMMERCIAL

## 2020-10-14 VITALS
HEART RATE: 73 BPM | WEIGHT: 131.37 LBS | BODY MASS INDEX: 23.72 KG/M2 | OXYGEN SATURATION: 99 % | SYSTOLIC BLOOD PRESSURE: 127 MMHG | TEMPERATURE: 97.2 F | DIASTOLIC BLOOD PRESSURE: 77 MMHG | RESPIRATION RATE: 16 BRPM

## 2020-10-14 PROCEDURE — 99214 OFFICE O/P EST MOD 30 MIN: CPT

## 2020-10-20 ENCOUNTER — NON-APPOINTMENT (OUTPATIENT)
Age: 60
End: 2020-10-20

## 2020-10-21 PROBLEM — Z51.5 ENCOUNTER FOR PALLIATIVE CARE: Status: ACTIVE | Noted: 2020-09-08

## 2020-10-21 PROBLEM — G89.3 PAIN, NEOPLASM-RELATED: Status: ACTIVE | Noted: 2020-09-08

## 2020-10-21 NOTE — ASSESSMENT
[FreeTextEntry1] : 60yoF with:\par \par 1. Endometrial cancer - On Carbo/Abraxane. Med Onc, Rad Onc follow up. \par \par 2. Neoplasm-related pain - c/w medical cannabis regimen, it is providing benefit. \par \par 3. CIPN - c/w medical cannabis regimen, it is providing benefit. \par \par 4. Encounter for Palliative Care - Emotional support provided.\par \par Follow up as needed.

## 2020-10-21 NOTE — HISTORY OF PRESENT ILLNESS
[Home] : at home, [unfilled] , at the time of the visit. [Medical Office: ([facility])___] : at the medical office located in  [Spouse] : spouse [Verbal consent obtained from patient] : the patient, [unfilled] [FreeTextEntry1] : 60yoF with endometrial cancer presents for follow up visit via telemedicine. \par \par Patient initially presented to her GYN (Dr. Powell, 5/17/18) for postmenopausal bleeding that began shortly after Lap cholecystectomy (4/9/18).  She underwent endometrial biopsy with Dr. English at [facility] on 5/21/18. Pathology was significant for endometrial adenocarcinoma. She subsequently underwent comprehensive surgical staging by Dr. Fuentes including RTLH, BSO, SLN, PPALND on 7/19/18. Postoperatively she did well and received vaginal brachytherapy 2500 cGy in 5 fractions over 10 days completed 9/17/18 with Dr. Littlejohn of radiation oncology. \par \par She developed new left sided abdominal pain around 11/2019. On exam, a mass was palpated under robotic trochar incision suspicious for recurrence. CT CAP 2/15/19 demonstrated new pulmonary nodule (two subcm nodules, new), external iliac lymphadenopathy, soft tissue intraperitoneal implants, and intramuscular enhancing nodules consistent with metastatic disease. US guided bx of abdominal wall mass on 3/6/20 demonstrated metastatic adenocarcinoma from primary endometrial adenocarcinoma. She started Carbo/taxol 3/30/20 and changed to Abraxane after C3, completed 6 cycles of chemotherapy. Follow-up imaging showed extensive peritoneal metastatic disease, mildly decreased with chemo. Multiple enlarged nodes, and large abdominal wall mass measuring 4.3x2.7cm. \par \par Today she continues to report pain to the left abdominal region which varies from dull to sharp. She is currently taking Tylenol 500 mg ES during the day and Tramadol at night. \par \par Patient reports pain in her left lower abdomen that shoots across her abdomen, comes on intermittently without warning. The pain is cramping, sharp. she has been using Tylenol 500mg three times daily and a 50mg Tramadol dose at night. She doesn't like to use the Tramadol because it makes her drowsy.  She is looking for an alternative like medical cannabis \par \par Interval Hx:\par She obtained medicinal cannabis in 1:1 THC:CBD in both capsule and tincture formulations.  She is using the 1:1 tincture PRN for pain at a low dose of about 1mg THC/CBD.  She alternates this with 1:1 capsule. \par She use the 50:1 THC:CBD at nighttime (~5mg THC)\par \par She has found that her nighttime restless legs have gotten better with the medical cannabis. Appetite has opened up as well. \par \par ROS:\par +CIPN in toes\par +constipation post-chemotherapy\par +occasional nausea \par All other ROS non-contributory\par \par Patient is , lives with her  and their 17 year old daughter. \par She works as an . \par \par [employer] Ref#: [id number]

## 2020-10-22 ENCOUNTER — APPOINTMENT (OUTPATIENT)
Dept: RADIATION ONCOLOGY | Facility: CLINIC | Age: 60
End: 2020-10-22

## 2020-10-23 NOTE — DISCUSSION/SUMMARY
[Reviewed Clinical Lab Test(s)] : Results of clinical tests were reviewed. [Reviewed Radiology Report(s)] : Radiology reports were reviewed. [Reviewed Radiology Film/Image(s)] : Images from radiology studies were reviewed and examined. [Discuss Alternatives/Risks/Benefits w/Patient] : All alternatives, risks, and benefits were discussed with the patient/family and all questions were answered.  Patient expressed good understanding and appreciates the importance of follow up as recommended. [FreeTextEntry1] : I sat down with Estefani and her  and reviewed her clinical course and recent imaging. She has completed 5 cycles of carboplatin and taxol based chemotherapy. She was switched to Abraxane due to persistent neuropathy. Scans after 3 cycles showed a partial response. \par She is due to have her next scan in 2 weeks to assess response. \par Her primary concern was whether she would be able to proceed with surgery. \par Given the multifocal disease and modest response to chemotherapy, I would not favor surgery. \par There is no evidence to suggest that cytoreductive surgery is associated with improved outcomes for patients with recurrent endometrial cancer and multifocal disease, particularly when disease involves the retroperitoneum. \par I would favor second line systemic treatment once a maximum response to CB/Abraxane has been achieved. She is due for imaging in early October to assess response after 6 cycles of therapy. \par Initial pathology with tumor that was MSI-S. S\par She is not be a candidate for single agent pembrolizumab, but would potentially be a candidate for the pembro/lenvatinib combination. \par She was quite upset to hear that surgery was not an option. I recommended that she seek another opinion to fully explore her options. \par We spent over 40 minutes in consultation discussing her disease, treatment to date, options for therapy and prognosis. \par

## 2020-10-23 NOTE — HISTORY OF PRESENT ILLNESS
[FreeTextEntry1] : STAGE IB grade 2 + LVSI endometrial cancer. \par RTLH, BSO, SLBM, P&PA LND on 7/19/18.\par \par Received vaginal brachytherapy 2500 cGy in 5 fractions over 10 days completed 9/17/18. \par \par She was seen in the office in Feb 2020 with a new complaint of left sided abdominal pain. Examination showed a firm tender mass under the left LSC incision concerning for recurrence. \par \par Subsequent imaging revealed multifocal recurrence with disease in the left rectus muscle, peritoneal nodules and pelvic adenopathy. Systemic chemotherapy was recommended.  \par \par She has now received 6 cycles of Carboplatin and Taxol chemotherapy. Most recent imaging 8/4/2020 show a modest response to treatment with slight interval decrease in all measurable disease sites. \par \par Mammogram 6/2018 BIRADS 2.

## 2020-10-23 NOTE — PHYSICAL EXAM
[Absent] : Adnexa(ae): Absent [Normal] : Recto-Vaginal Exam: Normal [Fully active, able to carry on all pre-disease performance without restriction] : Status 0 - Fully active, able to carry on all pre-disease performance without restriction [de-identified] : well healed LSC scars, palpable 3 cm firm mass just under the left sided robotic trochar incision

## 2020-10-27 LAB
ALBUMIN SERPL ELPH-MCNC: 4.5 G/DL
ALP BLD-CCNC: 116 U/L
ALT SERPL-CCNC: 23 U/L
ANION GAP SERPL CALC-SCNC: 15 MMOL/L
AST SERPL-CCNC: 17 U/L
BILIRUB SERPL-MCNC: 0.2 MG/DL
BUN SERPL-MCNC: 13 MG/DL
CALCIUM SERPL-MCNC: 9.4 MG/DL
CANCER AG125 SERPL-ACNC: 10 U/ML
CEA SERPL-MCNC: 2.1 NG/ML
CHLORIDE SERPL-SCNC: 105 MMOL/L
CO2 SERPL-SCNC: 24 MMOL/L
CREAT SERPL-MCNC: 0.79 MG/DL
GLUCOSE SERPL-MCNC: 86 MG/DL
MAGNESIUM SERPL-MCNC: 1.7 MG/DL
POTASSIUM SERPL-SCNC: 4 MMOL/L
PROT SERPL-MCNC: 6.6 G/DL
SODIUM SERPL-SCNC: 144 MMOL/L

## 2020-10-29 ENCOUNTER — OUTPATIENT (OUTPATIENT)
Dept: OUTPATIENT SERVICES | Facility: HOSPITAL | Age: 60
LOS: 1 days | Discharge: HOME | End: 2020-10-29

## 2020-10-29 ENCOUNTER — APPOINTMENT (OUTPATIENT)
Dept: HEMATOLOGY ONCOLOGY | Facility: CLINIC | Age: 60
End: 2020-10-29

## 2020-10-29 DIAGNOSIS — Z98.890 OTHER SPECIFIED POSTPROCEDURAL STATES: Chronic | ICD-10-CM

## 2020-10-29 DIAGNOSIS — Z90.49 ACQUIRED ABSENCE OF OTHER SPECIFIED PARTS OF DIGESTIVE TRACT: Chronic | ICD-10-CM

## 2020-10-29 DIAGNOSIS — C54.1 MALIGNANT NEOPLASM OF ENDOMETRIUM: ICD-10-CM

## 2020-10-29 DIAGNOSIS — Z98.891 HISTORY OF UTERINE SCAR FROM PREVIOUS SURGERY: Chronic | ICD-10-CM

## 2020-10-30 ENCOUNTER — APPOINTMENT (OUTPATIENT)
Dept: HEMATOLOGY ONCOLOGY | Facility: CLINIC | Age: 60
End: 2020-10-30
Payer: COMMERCIAL

## 2020-10-30 PROCEDURE — 99214 OFFICE O/P EST MOD 30 MIN: CPT | Mod: 95

## 2020-11-03 NOTE — PHYSICAL EXAM
[Restricted in physically strenuous activity but ambulatory and able to carry out work of a light or sedentary nature] : Status 1- Restricted in physically strenuous activity but ambulatory and able to carry out work of a light or sedentary nature, e.g., light house work, office work [Normal] : affect appropriate [de-identified] : +alopecia  [de-identified] : Normal respiratory effort and rate

## 2020-11-03 NOTE — REVIEW OF SYSTEMS
[Negative] : Musculoskeletal [Neuropathy] : neuropathy [FreeTextEntry2] : grade 1 neuropathy of fingers and toes b/l, improved after this cycle  [de-identified] : mild constipation, occasional R sided/umbilical abdominal discomfort

## 2020-11-03 NOTE — HISTORY OF PRESENT ILLNESS
[Medical Office: ([facility])___] : at ~his/her~ medical office located in [place] [Home] : at home, [unfilled] , at the time of the visit. [Spouse] : spouse [Patient] : the patient [Self] : self [de-identified] : Referred by Dr. Fuentes\par Telephone visit:\par Due to the COVID19 pandemic, this interview was conducted over the telephone, with the patient and her . I was not able to do a physical exam during this interview. 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 She is being referred to medical oncology for treatment considerations for recurrent metastatic disease.\par \par Ms. Manriquez initially presented to her GYN (Dr. Powell, 5/17/18) for postmenopausal bleeding that began shortly after Lap cholecystectomy (4/9/18). She underwent a Pelvic US that showed 10.8 x 6.7 x 6.1 cm uterus with fibroids. She underwent endometrial biopsy with Dr. English at [facility] on 5/21/18. Pathology was significant for endometrial adenocarcinoma, endometroid type, FIGO grade 1 nuclear grade 2 with foci of squamous differentiation. \par \par MRI of the pelvis on 5/24/18 showed a large mass involving the lower uterine segment and body. There were no pathologically enlarged lymph nodes but there was a large heterogeneous lesion involving the left sacrum suspicious for osseous metastatic disease. She underwent biopsy of the left sacral lesion which was read as consistent with hemangioma to rule out metastatic disease.\par \par She subsequently underwent comprehensive surgical staging by Dr. Fuentes including RTLH, BSO, SLN, PPALND on 7/19/18. Pathology for significant for FIGO grade 1 nuclear grade 2 invasive adenocarcinoma with LVSI and 17/20 mm muscle invasion, stage IB. There was no cervical stromal invasion, lower uterine segment invasion, or uterine serosal involvement. All the sampled left pelvic, right pelvic and para-aortic nodes were free of cancer. \par \par Postoperatively she did well and received vaginal brachytherapy 2500 cGy in 5 fractions over 10 days completed 9/17/18 with Dr. Littlejohn of radiation oncology. \par \par She felt well without any concerning symptoms and was PHUONG under surveillance until she reported new left sided abdominal pain and a firmness. In retrospect, she thinks that she felt something different in her L abdomen/pelvis for the past 1-2 months. She followed up with Dr. Fuentes who palpated a 3cm left sided mass under robotic trochar incision suspicious for recurrence. \par \par CT CAP 2/15/19 demonstrated:\par New pulmonary nodule (two subcm nodules, new), external iliac lymphadenopathy, soft tissue intraperitoneal implants, and intramuscular enhancing nodules consistent with metastatic disease. \par \par PERITONEUM: Trace amount of pelvic fluid. There multiple new soft tissue masses, for instance \par adjacent to the descending colon along the left paracolic gutter measuring 4.6 x 3.1 x 2.2 cm and \par within the right lower quadrant measuring 3.6 x 3.4 x 1.8 cm. \par VESSELS: Within normal limits. \par RETROPERITONEUM/LYMPH NODES: Bilateral new enlarged external iliac lymph nodes (2:133) \par measuring up to 2.2 x 1.7 cm on the left \par ABDOMINAL WALL: Within the left rectus muscle there is an enhancing mass measuring 5.0 x 2.8 x \par 5.7 cm, with an additional nodule within the anterior subcutaneous tissues. There is an additional 2 \par cm mass abutting the inferior left rectus muscle. \par BONES: Within normal limits. \par \par She underwent US-guided abdominal wall mass biopsy on 3/6 by IR. Pathology review from the biopsy demonstrated metastatic adenocarcinoma, c/w metastasis from patient's known history of endometroid endometrial adenocarcinoma. \par \par Addendum\par Addendum issued to report results of additional immunostains\par P53:Wild type; Ki-67 is mildly elevated, WT1 is negative\par The cytomorphology in conjunction with the immunophenotype is consistent with metastases from a primary endometrioid endometrial adenocarcinoma.\par \par She has been referred to medical oncology to discuss systemic treatment for her recurrent endometrial cancer. She feels very well overall, although she continues to feel the intermittent L sided abdominal discomfort and pulling sensation. She is working full time and lives with her . She has a daughter. She has a good appetite and denies any weight changes.  [de-identified] : The patient is here for follow up, currently on chemotherapy with carbo/taxol, last dose 9/18/20.\par Since her last visit she continues to feel well overall with good appetite and energy. She has been busy working from home, caring for her dog and managing all of her ADLs without issues. She has grade 1 fatigue which is stable. She has had increased neuropathic symptoms, increased numbness and "electric like" sensations occasionally. These symptoms were less severe with reduced doses of chemotherapy with this cycle. She has seen palliative care and planning to start medical marijuana. Currently she is taking Tylenol and Tramadol for her pain, but is not really using more than 1-2x daily, which has been adequate for abdominal pain. She is seeing Dr. Fuentes this week to discuss possible surgical options and Dr. Littlejohn about RT options. \par \par Feels well overall\par Medical marijuana is helping with neuropathy but feels that it's affecting her memory\par Saw Dr. Fuentes and said no further surgery\radha Would like to get 2nd opinion for surgery at [facility], Dr. Marroquin \par \par Had flu shot 3 years ago with reaction, sensation of shakiness and then has not had it yet\par \par \par She denies fevers, chills, nausea, vomiting, abdominal pain, urinary symptoms, vaginal bleeding/discharge, CP or SOB.

## 2020-11-05 ENCOUNTER — APPOINTMENT (OUTPATIENT)
Dept: HEMATOLOGY ONCOLOGY | Facility: CLINIC | Age: 60
End: 2020-11-05

## 2020-11-05 ENCOUNTER — RESULT REVIEW (OUTPATIENT)
Age: 60
End: 2020-11-05

## 2020-11-05 LAB
ALBUMIN SERPL ELPH-MCNC: 4.4 G/DL
ALP BLD-CCNC: 83 U/L
ALT SERPL-CCNC: 12 U/L
ANION GAP SERPL CALC-SCNC: 15 MMOL/L
AST SERPL-CCNC: 15 U/L
BASOPHILS # BLD AUTO: 0.02 K/UL — SIGNIFICANT CHANGE UP (ref 0–0.2)
BASOPHILS NFR BLD AUTO: 0.4 % — SIGNIFICANT CHANGE UP (ref 0–2)
BILIRUB SERPL-MCNC: 0.7 MG/DL
BUN SERPL-MCNC: 17 MG/DL
CALCIUM SERPL-MCNC: 9.5 MG/DL
CANCER AG125 SERPL-ACNC: 10 U/ML
CEA SERPL-MCNC: 2.1 NG/ML
CHLORIDE SERPL-SCNC: 105 MMOL/L
CO2 SERPL-SCNC: 23 MMOL/L
CREAT SERPL-MCNC: 0.85 MG/DL
EOSINOPHIL # BLD AUTO: 0.01 K/UL — SIGNIFICANT CHANGE UP (ref 0–0.5)
EOSINOPHIL NFR BLD AUTO: 0.2 % — SIGNIFICANT CHANGE UP (ref 0–6)
GLUCOSE SERPL-MCNC: 115 MG/DL
HCT VFR BLD CALC: 30 % — LOW (ref 34.5–45)
HGB BLD-MCNC: 10 G/DL — LOW (ref 11.5–15.5)
IMM GRANULOCYTES NFR BLD AUTO: 0.4 % — SIGNIFICANT CHANGE UP (ref 0–1.5)
LYMPHOCYTES # BLD AUTO: 1.39 K/UL — SIGNIFICANT CHANGE UP (ref 1–3.3)
LYMPHOCYTES # BLD AUTO: 30.3 % — SIGNIFICANT CHANGE UP (ref 13–44)
MAGNESIUM SERPL-MCNC: 1.8 MG/DL
MCHC RBC-ENTMCNC: 30.2 PG — SIGNIFICANT CHANGE UP (ref 27–34)
MCHC RBC-ENTMCNC: 33.3 G/DL — SIGNIFICANT CHANGE UP (ref 32–36)
MCV RBC AUTO: 90.6 FL — SIGNIFICANT CHANGE UP (ref 80–100)
MONOCYTES # BLD AUTO: 0.3 K/UL — SIGNIFICANT CHANGE UP (ref 0–0.9)
MONOCYTES NFR BLD AUTO: 6.5 % — SIGNIFICANT CHANGE UP (ref 2–14)
NEUTROPHILS # BLD AUTO: 2.85 K/UL — SIGNIFICANT CHANGE UP (ref 1.8–7.4)
NEUTROPHILS NFR BLD AUTO: 62.2 % — SIGNIFICANT CHANGE UP (ref 43–77)
NRBC # BLD: 0 /100 WBCS — SIGNIFICANT CHANGE UP (ref 0–0)
PLATELET # BLD AUTO: 157 K/UL — SIGNIFICANT CHANGE UP (ref 150–400)
POTASSIUM SERPL-SCNC: 3.9 MMOL/L
PROT SERPL-MCNC: 6.7 G/DL
RBC # BLD: 3.31 M/UL — LOW (ref 3.8–5.2)
RBC # FLD: 13.2 % — SIGNIFICANT CHANGE UP (ref 10.3–14.5)
SODIUM SERPL-SCNC: 143 MMOL/L
WBC # BLD: 4.59 K/UL — SIGNIFICANT CHANGE UP (ref 3.8–10.5)
WBC # FLD AUTO: 4.59 K/UL — SIGNIFICANT CHANGE UP (ref 3.8–10.5)

## 2020-11-11 ENCOUNTER — RESULT REVIEW (OUTPATIENT)
Age: 60
End: 2020-11-11

## 2020-11-11 ENCOUNTER — APPOINTMENT (OUTPATIENT)
Dept: INFUSION THERAPY | Facility: HOSPITAL | Age: 60
End: 2020-11-11

## 2020-11-11 DIAGNOSIS — R11.2 NAUSEA WITH VOMITING, UNSPECIFIED: ICD-10-CM

## 2020-11-11 DIAGNOSIS — Z51.11 ENCOUNTER FOR ANTINEOPLASTIC CHEMOTHERAPY: ICD-10-CM

## 2020-11-11 LAB
BASOPHILS # BLD AUTO: 0.03 K/UL — SIGNIFICANT CHANGE UP (ref 0–0.2)
BASOPHILS NFR BLD AUTO: 1 % — SIGNIFICANT CHANGE UP (ref 0–2)
EOSINOPHIL # BLD AUTO: 0 K/UL — SIGNIFICANT CHANGE UP (ref 0–0.5)
EOSINOPHIL NFR BLD AUTO: 0 % — SIGNIFICANT CHANGE UP (ref 0–6)
HCT VFR BLD CALC: 30.4 % — LOW (ref 34.5–45)
HGB BLD-MCNC: 10 G/DL — LOW (ref 11.5–15.5)
LYMPHOCYTES # BLD AUTO: 1.18 K/UL — SIGNIFICANT CHANGE UP (ref 1–3.3)
LYMPHOCYTES # BLD AUTO: 34 % — SIGNIFICANT CHANGE UP (ref 13–44)
MCHC RBC-ENTMCNC: 29.2 PG — SIGNIFICANT CHANGE UP (ref 27–34)
MCHC RBC-ENTMCNC: 32.9 G/DL — SIGNIFICANT CHANGE UP (ref 32–36)
MCV RBC AUTO: 88.9 FL — SIGNIFICANT CHANGE UP (ref 80–100)
MONOCYTES # BLD AUTO: 0.1 K/UL — SIGNIFICANT CHANGE UP (ref 0–0.9)
MONOCYTES NFR BLD AUTO: 3 % — SIGNIFICANT CHANGE UP (ref 2–14)
NEUTROPHILS # BLD AUTO: 2.15 K/UL — SIGNIFICANT CHANGE UP (ref 1.8–7.4)
NEUTROPHILS NFR BLD AUTO: 62 % — SIGNIFICANT CHANGE UP (ref 43–77)
NRBC # BLD: 0 /100 — SIGNIFICANT CHANGE UP (ref 0–0)
NRBC # BLD: SIGNIFICANT CHANGE UP /100 WBCS (ref 0–0)
PLAT MORPH BLD: NORMAL — SIGNIFICANT CHANGE UP
PLATELET # BLD AUTO: 198 K/UL — SIGNIFICANT CHANGE UP (ref 150–400)
RBC # BLD: 3.42 M/UL — LOW (ref 3.8–5.2)
RBC # FLD: 12.1 % — SIGNIFICANT CHANGE UP (ref 10.3–14.5)
RBC BLD AUTO: SIGNIFICANT CHANGE UP
WBC # BLD: 3.46 K/UL — LOW (ref 3.8–10.5)
WBC # FLD AUTO: 3.46 K/UL — LOW (ref 3.8–10.5)

## 2020-11-12 ENCOUNTER — NON-APPOINTMENT (OUTPATIENT)
Age: 60
End: 2020-11-12

## 2020-11-12 ENCOUNTER — APPOINTMENT (OUTPATIENT)
Dept: INFUSION THERAPY | Facility: HOSPITAL | Age: 60
End: 2020-11-12

## 2020-11-12 DIAGNOSIS — Z51.89 ENCOUNTER FOR OTHER SPECIFIED AFTERCARE: ICD-10-CM

## 2020-11-13 ENCOUNTER — APPOINTMENT (OUTPATIENT)
Dept: INFUSION THERAPY | Facility: HOSPITAL | Age: 60
End: 2020-11-13

## 2020-11-17 NOTE — PHYSICAL EXAM
[Restricted in physically strenuous activity but ambulatory and able to carry out work of a light or sedentary nature] : Status 1- Restricted in physically strenuous activity but ambulatory and able to carry out work of a light or sedentary nature, e.g., light house work, office work [Normal] : affect appropriate [de-identified] : +alopecia  [de-identified] : Normal respiratory effort and rate

## 2020-11-17 NOTE — REVIEW OF SYSTEMS
[Negative] : Musculoskeletal [Neuropathy] : neuropathy [FreeTextEntry2] : grade 1 neuropathy of fingers and toes b/l, improved after this cycle  [de-identified] : mild constipation, occasional R sided/umbilical abdominal discomfort

## 2020-11-17 NOTE — HISTORY OF PRESENT ILLNESS
[Home] : at home, [unfilled] , at the time of the visit. [Medical Office: ([facility])___] : at ~his/her~ medical office located [place] [Spouse] : spouse [Patient] : the patient [Self] : self [de-identified] : Referred by Dr. Fuentes\par Telephone visit:\par Due to the COVID19 pandemic, this interview was conducted over the telephone, with the patient and her . I was not able to do a physical exam during this interview. 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 She is being referred to medical oncology for treatment considerations for recurrent metastatic disease.\par \par Ms. Manriquez initially presented to her GYN (Dr. Powell, 5/17/18) for postmenopausal bleeding that began shortly after Lap cholecystectomy (4/9/18). She underwent a Pelvic US that showed 10.8 x 6.7 x 6.1 cm uterus with fibroids. She underwent endometrial biopsy with Dr. English at [place] on 5/21/18. Pathology was significant for endometrial adenocarcinoma, endometroid type, FIGO grade 1 nuclear grade 2 with foci of squamous differentiation. \par \par MRI of the pelvis on 5/24/18 showed a large mass involving the lower uterine segment and body. There were no pathologically enlarged lymph nodes but there was a large heterogeneous lesion involving the left sacrum suspicious for osseous metastatic disease. She underwent biopsy of the left sacral lesion which was read as consistent with hemangioma to rule out metastatic disease.\par \par She subsequently underwent comprehensive surgical staging by Dr. Fuentes including RTLH, BSO, SLN, PPALND on 7/19/18. Pathology for significant for FIGO grade 1 nuclear grade 2 invasive adenocarcinoma with LVSI and 17/20 mm muscle invasion, stage IB. There was no cervical stromal invasion, lower uterine segment invasion, or uterine serosal involvement. All the sampled left pelvic, right pelvic and para-aortic nodes were free of cancer. \par \par Postoperatively she did well and received vaginal brachytherapy 2500 cGy in 5 fractions over 10 days completed 9/17/18 with Dr. Littlejohn of radiation oncology. \par \par She felt well without any concerning symptoms and was PHUONG under surveillance until she reported new left sided abdominal pain and a firmness. In retrospect, she thinks that she felt something different in her L abdomen/pelvis for the past 1-2 months. She followed up with Dr. Fuentes who palpated a 3cm left sided mass under robotic trochar incision suspicious for recurrence. \par \par CT CAP 2/15/19 demonstrated:\par New pulmonary nodule (two subcm nodules, new), external iliac lymphadenopathy, soft tissue intraperitoneal implants, and intramuscular enhancing nodules consistent with metastatic disease. \par \par PERITONEUM: Trace amount of pelvic fluid. There multiple new soft tissue masses, for instance \par adjacent to the descending colon along the left paracolic gutter measuring 4.6 x 3.1 x 2.2 cm and \par within the right lower quadrant measuring 3.6 x 3.4 x 1.8 cm. \par VESSELS: Within normal limits. \par RETROPERITONEUM/LYMPH NODES: Bilateral new enlarged external iliac lymph nodes (2:133) \par measuring up to 2.2 x 1.7 cm on the left \par ABDOMINAL WALL: Within the left rectus muscle there is an enhancing mass measuring 5.0 x 2.8 x \par 5.7 cm, with an additional nodule within the anterior subcutaneous tissues. There is an additional 2 \par cm mass abutting the inferior left rectus muscle. \par BONES: Within normal limits. \par \par She underwent US-guided abdominal wall mass biopsy on 3/6 by IR. Pathology review from the biopsy demonstrated metastatic adenocarcinoma, c/w metastasis from patient's known history of endometroid endometrial adenocarcinoma. \par \par Addendum\par Addendum issued to report results of additional immunostains\par P53:Wild type; Ki-67 is mildly elevated, WT1 is negative\par The cytomorphology in conjunction with the immunophenotype is consistent with metastases from a primary endometrioid endometrial adenocarcinoma.\par \par She has been referred to medical oncology to discuss systemic treatment for her recurrent endometrial cancer. She feels very well overall, although she continues to feel the intermittent L sided abdominal discomfort and pulling sensation. She is working full time and lives with her . She has a daughter. She has a good appetite and denies any weight changes.  [de-identified] : The patient is here for follow up, currently on chemotherapy with carbo/taxol, last dose 9/18/20.\par Since her last visit she continues to feel well overall with good appetite and energy. She has been busy working from home, caring for her dog and managing all of her ADLs without issues. She has grade 1 fatigue which is stable. She has had increased neuropathic symptoms, increased numbness and "electric like" sensations occasionally. These symptoms were less severe with reduced doses of chemotherapy with this cycle. She has seen palliative care and planning to start medical marijuana. Currently she is taking Tylenol and Tramadol for her pain, but is not really using more than 1-2x daily, which has been adequate for abdominal pain. She is seeing Dr. Fuentes this week to discuss possible surgical options and Dr. Littlejohn about RT options. She denies fevers, chills, nausea, vomiting, abdominal pain, urinary symptoms, vaginal bleeding/discharge, CP or SOB. \par \par \[facility] Went to see Dr. Marroquin at [place] \[facility] was offered surgery in conjunction with gyn oncology \[facility]

## 2020-11-20 ENCOUNTER — RESULT REVIEW (OUTPATIENT)
Age: 60
End: 2020-11-20

## 2020-11-20 ENCOUNTER — APPOINTMENT (OUTPATIENT)
Dept: HEMATOLOGY ONCOLOGY | Facility: CLINIC | Age: 60
End: 2020-11-20

## 2020-11-20 LAB
ALBUMIN SERPL ELPH-MCNC: 4.5 G/DL
ALP BLD-CCNC: 110 U/L
ALT SERPL-CCNC: 14 U/L
ANION GAP SERPL CALC-SCNC: 11 MMOL/L
AST SERPL-CCNC: 14 U/L
BASOPHILS # BLD AUTO: 0.2 K/UL — SIGNIFICANT CHANGE UP (ref 0–0.2)
BASOPHILS NFR BLD AUTO: 2 % — SIGNIFICANT CHANGE UP (ref 0–2)
BILIRUB SERPL-MCNC: 0.2 MG/DL
BUN SERPL-MCNC: 16 MG/DL
CALCIUM SERPL-MCNC: 9.4 MG/DL
CANCER AG125 SERPL-ACNC: 8 U/ML
CHLORIDE SERPL-SCNC: 105 MMOL/L
CO2 SERPL-SCNC: 28 MMOL/L
CREAT SERPL-MCNC: 0.86 MG/DL
EOSINOPHIL # BLD AUTO: 0.1 K/UL — SIGNIFICANT CHANGE UP (ref 0–0.5)
EOSINOPHIL NFR BLD AUTO: 1 % — SIGNIFICANT CHANGE UP (ref 0–6)
GLUCOSE SERPL-MCNC: 104 MG/DL
HCT VFR BLD CALC: 28.6 % — LOW (ref 34.5–45)
HGB BLD-MCNC: 9.6 G/DL — LOW (ref 11.5–15.5)
LYMPHOCYTES # BLD AUTO: 2.06 K/UL — SIGNIFICANT CHANGE UP (ref 1–3.3)
LYMPHOCYTES # BLD AUTO: 21 % — SIGNIFICANT CHANGE UP (ref 13–44)
MAGNESIUM SERPL-MCNC: 1.6 MG/DL
MCHC RBC-ENTMCNC: 29.7 PG — SIGNIFICANT CHANGE UP (ref 27–34)
MCHC RBC-ENTMCNC: 33.6 G/DL — SIGNIFICANT CHANGE UP (ref 32–36)
MCV RBC AUTO: 88.5 FL — SIGNIFICANT CHANGE UP (ref 80–100)
MONOCYTES # BLD AUTO: 0.79 K/UL — SIGNIFICANT CHANGE UP (ref 0–0.9)
MONOCYTES NFR BLD AUTO: 8 % — SIGNIFICANT CHANGE UP (ref 2–14)
MYELOCYTES NFR BLD: 1 % — HIGH (ref 0–0)
NEUTROPHILS # BLD AUTO: 6.58 K/UL — SIGNIFICANT CHANGE UP (ref 1.8–7.4)
NEUTROPHILS NFR BLD AUTO: 67 % — SIGNIFICANT CHANGE UP (ref 43–77)
NRBC # BLD: 0 /100 — SIGNIFICANT CHANGE UP (ref 0–0)
NRBC # BLD: SIGNIFICANT CHANGE UP /100 WBCS (ref 0–0)
PLAT MORPH BLD: NORMAL — SIGNIFICANT CHANGE UP
PLATELET # BLD AUTO: 172 K/UL — SIGNIFICANT CHANGE UP (ref 150–400)
POTASSIUM SERPL-SCNC: 3.9 MMOL/L
PROT SERPL-MCNC: 6.4 G/DL
RBC # BLD: 3.23 M/UL — LOW (ref 3.8–5.2)
RBC # FLD: 11.7 % — SIGNIFICANT CHANGE UP (ref 10.3–14.5)
RBC BLD AUTO: SIGNIFICANT CHANGE UP
SODIUM SERPL-SCNC: 144 MMOL/L
WBC # BLD: 9.82 K/UL — SIGNIFICANT CHANGE UP (ref 3.8–10.5)
WBC # FLD AUTO: 9.82 K/UL — SIGNIFICANT CHANGE UP (ref 3.8–10.5)

## 2020-11-23 ENCOUNTER — APPOINTMENT (OUTPATIENT)
Dept: HEMATOLOGY ONCOLOGY | Facility: CLINIC | Age: 60
End: 2020-11-23
Payer: COMMERCIAL

## 2020-11-23 VITALS
RESPIRATION RATE: 16 BRPM | SYSTOLIC BLOOD PRESSURE: 149 MMHG | HEART RATE: 74 BPM | OXYGEN SATURATION: 99 % | TEMPERATURE: 97.9 F | DIASTOLIC BLOOD PRESSURE: 74 MMHG

## 2020-11-23 PROCEDURE — 99214 OFFICE O/P EST MOD 30 MIN: CPT

## 2020-11-23 PROCEDURE — 99072 ADDL SUPL MATRL&STAF TM PHE: CPT

## 2020-11-28 ENCOUNTER — OUTPATIENT (OUTPATIENT)
Dept: OUTPATIENT SERVICES | Facility: HOSPITAL | Age: 60
LOS: 1 days | Discharge: HOME | End: 2020-11-28

## 2020-11-28 DIAGNOSIS — Z98.890 OTHER SPECIFIED POSTPROCEDURAL STATES: Chronic | ICD-10-CM

## 2020-11-28 DIAGNOSIS — Z98.891 HISTORY OF UTERINE SCAR FROM PREVIOUS SURGERY: Chronic | ICD-10-CM

## 2020-11-28 DIAGNOSIS — C54.1 MALIGNANT NEOPLASM OF ENDOMETRIUM: ICD-10-CM

## 2020-11-28 DIAGNOSIS — Z90.49 ACQUIRED ABSENCE OF OTHER SPECIFIED PARTS OF DIGESTIVE TRACT: Chronic | ICD-10-CM

## 2020-12-02 ENCOUNTER — RESULT REVIEW (OUTPATIENT)
Age: 60
End: 2020-12-02

## 2020-12-02 ENCOUNTER — APPOINTMENT (OUTPATIENT)
Dept: INFUSION THERAPY | Facility: HOSPITAL | Age: 60
End: 2020-12-02

## 2020-12-02 DIAGNOSIS — Z51.11 ENCOUNTER FOR ANTINEOPLASTIC CHEMOTHERAPY: ICD-10-CM

## 2020-12-02 DIAGNOSIS — R11.2 NAUSEA WITH VOMITING, UNSPECIFIED: ICD-10-CM

## 2020-12-02 LAB
BASOPHILS # BLD AUTO: 0.03 K/UL — SIGNIFICANT CHANGE UP (ref 0–0.2)
BASOPHILS NFR BLD AUTO: 0.5 % — SIGNIFICANT CHANGE UP (ref 0–2)
EOSINOPHIL # BLD AUTO: 0.03 K/UL — SIGNIFICANT CHANGE UP (ref 0–0.5)
EOSINOPHIL NFR BLD AUTO: 0.5 % — SIGNIFICANT CHANGE UP (ref 0–6)
HCT VFR BLD CALC: 30 % — LOW (ref 34.5–45)
HGB BLD-MCNC: 10.1 G/DL — LOW (ref 11.5–15.5)
IMM GRANULOCYTES NFR BLD AUTO: 0.5 % — SIGNIFICANT CHANGE UP (ref 0–1.5)
LYMPHOCYTES # BLD AUTO: 1.65 K/UL — SIGNIFICANT CHANGE UP (ref 1–3.3)
LYMPHOCYTES # BLD AUTO: 29 % — SIGNIFICANT CHANGE UP (ref 13–44)
MCHC RBC-ENTMCNC: 29.8 PG — SIGNIFICANT CHANGE UP (ref 27–34)
MCHC RBC-ENTMCNC: 33.7 G/DL — SIGNIFICANT CHANGE UP (ref 32–36)
MCV RBC AUTO: 88.5 FL — SIGNIFICANT CHANGE UP (ref 80–100)
MONOCYTES # BLD AUTO: 0.64 K/UL — SIGNIFICANT CHANGE UP (ref 0–0.9)
MONOCYTES NFR BLD AUTO: 11.3 % — SIGNIFICANT CHANGE UP (ref 2–14)
NEUTROPHILS # BLD AUTO: 3.3 K/UL — SIGNIFICANT CHANGE UP (ref 1.8–7.4)
NEUTROPHILS NFR BLD AUTO: 58.2 % — SIGNIFICANT CHANGE UP (ref 43–77)
NRBC # BLD: 0 /100 WBCS — SIGNIFICANT CHANGE UP (ref 0–0)
PLATELET # BLD AUTO: 268 K/UL — SIGNIFICANT CHANGE UP (ref 150–400)
RBC # BLD: 3.39 M/UL — LOW (ref 3.8–5.2)
RBC # FLD: 13.3 % — SIGNIFICANT CHANGE UP (ref 10.3–14.5)
WBC # BLD: 5.68 K/UL — SIGNIFICANT CHANGE UP (ref 3.8–10.5)
WBC # FLD AUTO: 5.68 K/UL — SIGNIFICANT CHANGE UP (ref 3.8–10.5)

## 2020-12-03 ENCOUNTER — NON-APPOINTMENT (OUTPATIENT)
Age: 60
End: 2020-12-03

## 2020-12-03 ENCOUNTER — APPOINTMENT (OUTPATIENT)
Dept: INFUSION THERAPY | Facility: HOSPITAL | Age: 60
End: 2020-12-03

## 2020-12-03 DIAGNOSIS — Z51.89 ENCOUNTER FOR OTHER SPECIFIED AFTERCARE: ICD-10-CM

## 2020-12-15 NOTE — REVIEW OF SYSTEMS
[Negative] : Musculoskeletal [Neuropathy] : neuropathy [FreeTextEntry2] : grade 1 neuropathy of fingers and toes b/l, improved after this cycle  [de-identified] : mild constipation, occasional R sided/umbilical abdominal discomfort

## 2020-12-15 NOTE — PHYSICAL EXAM
[Restricted in physically strenuous activity but ambulatory and able to carry out work of a light or sedentary nature] : Status 1- Restricted in physically strenuous activity but ambulatory and able to carry out work of a light or sedentary nature, e.g., light house work, office work [Normal] : affect appropriate [de-identified] : +alopecia  [de-identified] : Normal respiratory effort and rate

## 2020-12-15 NOTE — HISTORY OF PRESENT ILLNESS
[Home] : at home, [unfilled] , at the time of the visit. [Medical Office: ([facility])___] : at ~his/her~ medical office located in [place] [Spouse] : spouse [Patient] : the patient [Self] : self [de-identified] : Referred by Dr. Fuentes\par Telephone visit:\par Due to the COVID19 pandemic, this interview was conducted over the telephone, with the patient and her . I was not able to do a physical exam during this interview. 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 She is being referred to medical oncology for treatment considerations for recurrent metastatic disease.\par \par Ms. Manriquez initially presented to her GYN (Dr. Powell, 5/17/18) for postmenopausal bleeding that began shortly after Lap cholecystectomy (4/9/18). She underwent a Pelvic US that showed 10.8 x 6.7 x 6.1 cm uterus with fibroids. She underwent endometrial biopsy with Dr. English at [place] on 5/21/18. Pathology was significant for endometrial adenocarcinoma, endometroid type, FIGO grade 1 nuclear grade 2 with foci of squamous differentiation. \par \par MRI of the pelvis on 5/24/18 showed a large mass involving the lower uterine segment and body. There were no pathologically enlarged lymph nodes but there was a large heterogeneous lesion involving the left sacrum suspicious for osseous metastatic disease. She underwent biopsy of the left sacral lesion which was read as consistent with hemangioma to rule out metastatic disease.\par \par She subsequently underwent comprehensive surgical staging by Dr. Fuentes including RTLH, BSO, SLN, PPALND on 7/19/18. Pathology for significant for FIGO grade 1 nuclear grade 2 invasive adenocarcinoma with LVSI and 17/20 mm muscle invasion, stage IB. There was no cervical stromal invasion, lower uterine segment invasion, or uterine serosal involvement. All the sampled left pelvic, right pelvic and para-aortic nodes were free of cancer. \par \par Postoperatively she did well and received vaginal brachytherapy 2500 cGy in 5 fractions over 10 days completed 9/17/18 with Dr. Littlejohn of radiation oncology. \par \par She felt well without any concerning symptoms and was PHUONG under surveillance until she reported new left sided abdominal pain and a firmness. In retrospect, she thinks that she felt something different in her L abdomen/pelvis for the past 1-2 months. She followed up with Dr. Fuentes who palpated a 3cm left sided mass under robotic trochar incision suspicious for recurrence. \par \par CT CAP 2/15/19 demonstrated:\par New pulmonary nodule (two subcm nodules, new), external iliac lymphadenopathy, soft tissue intraperitoneal implants, and intramuscular enhancing nodules consistent with metastatic disease. \par \par PERITONEUM: Trace amount of pelvic fluid. There multiple new soft tissue masses, for instance \par adjacent to the descending colon along the left paracolic gutter measuring 4.6 x 3.1 x 2.2 cm and \par within the right lower quadrant measuring 3.6 x 3.4 x 1.8 cm. \par VESSELS: Within normal limits. \par RETROPERITONEUM/LYMPH NODES: Bilateral new enlarged external iliac lymph nodes (2:133) \par measuring up to 2.2 x 1.7 cm on the left \par ABDOMINAL WALL: Within the left rectus muscle there is an enhancing mass measuring 5.0 x 2.8 x \par 5.7 cm, with an additional nodule within the anterior subcutaneous tissues. There is an additional 2 \par cm mass abutting the inferior left rectus muscle. \par BONES: Within normal limits. \par \par She underwent US-guided abdominal wall mass biopsy on 3/6 by IR. Pathology review from the biopsy demonstrated metastatic adenocarcinoma, c/w metastasis from patient's known history of endometroid endometrial adenocarcinoma. \par \par Addendum\par Addendum issued to report results of additional immunostains\par P53:Wild type; Ki-67 is mildly elevated, WT1 is negative\par The cytomorphology in conjunction with the immunophenotype is consistent with metastases from a primary endometrioid endometrial adenocarcinoma.\par \par She has been referred to medical oncology to discuss systemic treatment for her recurrent endometrial cancer. She feels very well overall, although she continues to feel the intermittent L sided abdominal discomfort and pulling sensation. She is working full time and lives with her . She has a daughter. She has a good appetite and denies any weight changes.  [de-identified] : The patient is here for follow up, currently on chemotherapy with carbo/taxol, last dose 9/18/20.\par Since her last visit she continues to feel well overall with good appetite and energy. She has been busy working from home, caring for her dog and managing all of her ADLs without issues. She has grade 1 fatigue which is stable. She has had increased neuropathic symptoms, increased numbness and "electric like" sensations occasionally. These symptoms were less severe with reduced doses of chemotherapy with this cycle. She has seen palliative care and planning to start medical marijuana. Currently she is taking Tylenol and Tramadol for her pain, but is not really using more than 1-2x daily, which has been adequate for abdominal pain. She is seeing Dr. Fuentes this week to discuss possible surgical options and Dr. Littlejohn about RT options. She denies fevers, chills, nausea, vomiting, abdominal pain, urinary symptoms, vaginal bleeding/discharge, CP or SOB. \par \par \[facility] Went to see Dr. Marroquin at [place] \[facility] was offered surgery in conjunction with gyn oncology \[facility]

## 2020-12-16 ENCOUNTER — APPOINTMENT (OUTPATIENT)
Dept: HEMATOLOGY ONCOLOGY | Facility: CLINIC | Age: 60
End: 2020-12-16

## 2020-12-16 ENCOUNTER — RESULT REVIEW (OUTPATIENT)
Age: 60
End: 2020-12-16

## 2020-12-16 LAB
BASOPHILS # BLD AUTO: 0.02 K/UL — SIGNIFICANT CHANGE UP (ref 0–0.2)
BASOPHILS NFR BLD AUTO: 0.3 % — SIGNIFICANT CHANGE UP (ref 0–2)
EOSINOPHIL # BLD AUTO: 0.01 K/UL — SIGNIFICANT CHANGE UP (ref 0–0.5)
EOSINOPHIL NFR BLD AUTO: 0.2 % — SIGNIFICANT CHANGE UP (ref 0–6)
HCT VFR BLD CALC: 31.2 % — LOW (ref 34.5–45)
HGB BLD-MCNC: 10 G/DL — LOW (ref 11.5–15.5)
IMM GRANULOCYTES NFR BLD AUTO: 1.7 % — HIGH (ref 0–1.5)
LYMPHOCYTES # BLD AUTO: 1.23 K/UL — SIGNIFICANT CHANGE UP (ref 1–3.3)
LYMPHOCYTES # BLD AUTO: 20.8 % — SIGNIFICANT CHANGE UP (ref 13–44)
MCHC RBC-ENTMCNC: 29.1 PG — SIGNIFICANT CHANGE UP (ref 27–34)
MCHC RBC-ENTMCNC: 32.1 G/DL — SIGNIFICANT CHANGE UP (ref 32–36)
MCV RBC AUTO: 90.7 FL — SIGNIFICANT CHANGE UP (ref 80–100)
MONOCYTES # BLD AUTO: 0.42 K/UL — SIGNIFICANT CHANGE UP (ref 0–0.9)
MONOCYTES NFR BLD AUTO: 7.1 % — SIGNIFICANT CHANGE UP (ref 2–14)
NEUTROPHILS # BLD AUTO: 4.14 K/UL — SIGNIFICANT CHANGE UP (ref 1.8–7.4)
NEUTROPHILS NFR BLD AUTO: 69.9 % — SIGNIFICANT CHANGE UP (ref 43–77)
NRBC # BLD: 0 /100 WBCS — SIGNIFICANT CHANGE UP (ref 0–0)
PLATELET # BLD AUTO: 153 K/UL — SIGNIFICANT CHANGE UP (ref 150–400)
RBC # BLD: 3.44 M/UL — LOW (ref 3.8–5.2)
RBC # FLD: 13.4 % — SIGNIFICANT CHANGE UP (ref 10.3–14.5)
WBC # BLD: 5.92 K/UL — SIGNIFICANT CHANGE UP (ref 3.8–10.5)
WBC # FLD AUTO: 5.92 K/UL — SIGNIFICANT CHANGE UP (ref 3.8–10.5)

## 2020-12-17 ENCOUNTER — APPOINTMENT (OUTPATIENT)
Dept: HEMATOLOGY ONCOLOGY | Facility: CLINIC | Age: 60
End: 2020-12-17
Payer: COMMERCIAL

## 2020-12-17 PROCEDURE — 99214 OFFICE O/P EST MOD 30 MIN: CPT | Mod: 95

## 2020-12-19 LAB
ALBUMIN SERPL ELPH-MCNC: 4.5 G/DL
ALP BLD-CCNC: 119 U/L
ALT SERPL-CCNC: 19 U/L
ANION GAP SERPL CALC-SCNC: 14 MMOL/L
AST SERPL-CCNC: 14 U/L
BILIRUB SERPL-MCNC: 0.3 MG/DL
BUN SERPL-MCNC: 21 MG/DL
CALCIUM SERPL-MCNC: 9.8 MG/DL
CANCER AG125 SERPL-ACNC: 10 U/ML
CEA SERPL-MCNC: 2.8 NG/ML
CHLORIDE SERPL-SCNC: 104 MMOL/L
CO2 SERPL-SCNC: 26 MMOL/L
CREAT SERPL-MCNC: 1.07 MG/DL
GLUCOSE SERPL-MCNC: 94 MG/DL
MAGNESIUM SERPL-MCNC: 1.7 MG/DL
POTASSIUM SERPL-SCNC: 4.2 MMOL/L
PROT SERPL-MCNC: 6.6 G/DL
SODIUM SERPL-SCNC: 145 MMOL/L

## 2020-12-21 ENCOUNTER — APPOINTMENT (OUTPATIENT)
Dept: INFUSION THERAPY | Facility: HOSPITAL | Age: 60
End: 2020-12-21

## 2020-12-21 ENCOUNTER — LABORATORY RESULT (OUTPATIENT)
Age: 60
End: 2020-12-21

## 2020-12-23 ENCOUNTER — RESULT REVIEW (OUTPATIENT)
Age: 60
End: 2020-12-23

## 2020-12-23 ENCOUNTER — APPOINTMENT (OUTPATIENT)
Dept: INFUSION THERAPY | Facility: HOSPITAL | Age: 60
End: 2020-12-23

## 2020-12-23 LAB
BASOPHILS # BLD AUTO: 0.03 K/UL — SIGNIFICANT CHANGE UP (ref 0–0.2)
BASOPHILS NFR BLD AUTO: 0.6 % — SIGNIFICANT CHANGE UP (ref 0–2)
EOSINOPHIL # BLD AUTO: 0.07 K/UL — SIGNIFICANT CHANGE UP (ref 0–0.5)
EOSINOPHIL NFR BLD AUTO: 1.3 % — SIGNIFICANT CHANGE UP (ref 0–6)
HCT VFR BLD CALC: 29.5 % — LOW (ref 34.5–45)
HGB BLD-MCNC: 9.9 G/DL — LOW (ref 11.5–15.5)
IMM GRANULOCYTES NFR BLD AUTO: 1.9 % — HIGH (ref 0–1.5)
LYMPHOCYTES # BLD AUTO: 1.3 K/UL — SIGNIFICANT CHANGE UP (ref 1–3.3)
LYMPHOCYTES # BLD AUTO: 24.6 % — SIGNIFICANT CHANGE UP (ref 13–44)
MCHC RBC-ENTMCNC: 29.6 PG — SIGNIFICANT CHANGE UP (ref 27–34)
MCHC RBC-ENTMCNC: 33.6 G/DL — SIGNIFICANT CHANGE UP (ref 32–36)
MCV RBC AUTO: 88.1 FL — SIGNIFICANT CHANGE UP (ref 80–100)
MONOCYTES # BLD AUTO: 0.59 K/UL — SIGNIFICANT CHANGE UP (ref 0–0.9)
MONOCYTES NFR BLD AUTO: 11.2 % — SIGNIFICANT CHANGE UP (ref 2–14)
NEUTROPHILS # BLD AUTO: 3.19 K/UL — SIGNIFICANT CHANGE UP (ref 1.8–7.4)
NEUTROPHILS NFR BLD AUTO: 60.4 % — SIGNIFICANT CHANGE UP (ref 43–77)
NRBC # BLD: 2 /100 WBCS — HIGH (ref 0–0)
PLATELET # BLD AUTO: 231 K/UL — SIGNIFICANT CHANGE UP (ref 150–400)
RBC # BLD: 3.35 M/UL — LOW (ref 3.8–5.2)
RBC # FLD: 13.9 % — SIGNIFICANT CHANGE UP (ref 10.3–14.5)
WBC # BLD: 5.28 K/UL — SIGNIFICANT CHANGE UP (ref 3.8–10.5)
WBC # FLD AUTO: 5.28 K/UL — SIGNIFICANT CHANGE UP (ref 3.8–10.5)

## 2020-12-24 ENCOUNTER — APPOINTMENT (OUTPATIENT)
Dept: INFUSION THERAPY | Facility: HOSPITAL | Age: 60
End: 2020-12-24

## 2020-12-31 ENCOUNTER — OUTPATIENT (OUTPATIENT)
Dept: OUTPATIENT SERVICES | Facility: HOSPITAL | Age: 60
LOS: 1 days | Discharge: HOME | End: 2020-12-31

## 2020-12-31 DIAGNOSIS — Z98.890 OTHER SPECIFIED POSTPROCEDURAL STATES: Chronic | ICD-10-CM

## 2020-12-31 DIAGNOSIS — Z90.49 ACQUIRED ABSENCE OF OTHER SPECIFIED PARTS OF DIGESTIVE TRACT: Chronic | ICD-10-CM

## 2020-12-31 DIAGNOSIS — C54.1 MALIGNANT NEOPLASM OF ENDOMETRIUM: ICD-10-CM

## 2020-12-31 DIAGNOSIS — Z98.891 HISTORY OF UTERINE SCAR FROM PREVIOUS SURGERY: Chronic | ICD-10-CM

## 2021-01-06 ENCOUNTER — RESULT REVIEW (OUTPATIENT)
Age: 61
End: 2021-01-06

## 2021-01-06 ENCOUNTER — APPOINTMENT (OUTPATIENT)
Dept: HEMATOLOGY ONCOLOGY | Facility: CLINIC | Age: 61
End: 2021-01-06
Payer: COMMERCIAL

## 2021-01-06 VITALS
DIASTOLIC BLOOD PRESSURE: 72 MMHG | OXYGEN SATURATION: 97 % | HEART RATE: 71 BPM | SYSTOLIC BLOOD PRESSURE: 124 MMHG | RESPIRATION RATE: 16 BRPM | TEMPERATURE: 98.1 F

## 2021-01-06 LAB
BASOPHILS # BLD AUTO: 0.03 K/UL — SIGNIFICANT CHANGE UP (ref 0–0.2)
BASOPHILS NFR BLD AUTO: 0.4 % — SIGNIFICANT CHANGE UP (ref 0–2)
EOSINOPHIL # BLD AUTO: 0.02 K/UL — SIGNIFICANT CHANGE UP (ref 0–0.5)
EOSINOPHIL NFR BLD AUTO: 0.2 % — SIGNIFICANT CHANGE UP (ref 0–6)
HCT VFR BLD CALC: 31.6 % — LOW (ref 34.5–45)
HGB BLD-MCNC: 10.3 G/DL — LOW (ref 11.5–15.5)
IMM GRANULOCYTES NFR BLD AUTO: 1.1 % — SIGNIFICANT CHANGE UP (ref 0–1.5)
LYMPHOCYTES # BLD AUTO: 1.62 K/UL — SIGNIFICANT CHANGE UP (ref 1–3.3)
LYMPHOCYTES # BLD AUTO: 19.9 % — SIGNIFICANT CHANGE UP (ref 13–44)
MCHC RBC-ENTMCNC: 28.8 PG — SIGNIFICANT CHANGE UP (ref 27–34)
MCHC RBC-ENTMCNC: 32.6 G/DL — SIGNIFICANT CHANGE UP (ref 32–36)
MCV RBC AUTO: 88.3 FL — SIGNIFICANT CHANGE UP (ref 80–100)
MONOCYTES # BLD AUTO: 0.52 K/UL — SIGNIFICANT CHANGE UP (ref 0–0.9)
MONOCYTES NFR BLD AUTO: 6.4 % — SIGNIFICANT CHANGE UP (ref 2–14)
NEUTROPHILS # BLD AUTO: 5.87 K/UL — SIGNIFICANT CHANGE UP (ref 1.8–7.4)
NEUTROPHILS NFR BLD AUTO: 72 % — SIGNIFICANT CHANGE UP (ref 43–77)
NRBC # BLD: 0 /100 WBCS — SIGNIFICANT CHANGE UP (ref 0–0)
PLATELET # BLD AUTO: 140 K/UL — LOW (ref 150–400)
RBC # BLD: 3.58 M/UL — LOW (ref 3.8–5.2)
RBC # FLD: 13.4 % — SIGNIFICANT CHANGE UP (ref 10.3–14.5)
WBC # BLD: 8.15 K/UL — SIGNIFICANT CHANGE UP (ref 3.8–10.5)
WBC # FLD AUTO: 8.15 K/UL — SIGNIFICANT CHANGE UP (ref 3.8–10.5)

## 2021-01-06 PROCEDURE — 99072 ADDL SUPL MATRL&STAF TM PHE: CPT

## 2021-01-06 PROCEDURE — 99214 OFFICE O/P EST MOD 30 MIN: CPT

## 2021-01-13 ENCOUNTER — RESULT REVIEW (OUTPATIENT)
Age: 61
End: 2021-01-13

## 2021-01-13 ENCOUNTER — APPOINTMENT (OUTPATIENT)
Dept: INFUSION THERAPY | Facility: HOSPITAL | Age: 61
End: 2021-01-13

## 2021-01-13 DIAGNOSIS — R11.2 NAUSEA WITH VOMITING, UNSPECIFIED: ICD-10-CM

## 2021-01-13 DIAGNOSIS — Z51.11 ENCOUNTER FOR ANTINEOPLASTIC CHEMOTHERAPY: ICD-10-CM

## 2021-01-13 LAB
BASOPHILS # BLD AUTO: 0.03 K/UL — SIGNIFICANT CHANGE UP (ref 0–0.2)
BASOPHILS NFR BLD AUTO: 0.5 % — SIGNIFICANT CHANGE UP (ref 0–2)
EOSINOPHIL # BLD AUTO: 0.05 K/UL — SIGNIFICANT CHANGE UP (ref 0–0.5)
EOSINOPHIL NFR BLD AUTO: 0.8 % — SIGNIFICANT CHANGE UP (ref 0–6)
HCT VFR BLD CALC: 30.5 % — LOW (ref 34.5–45)
HGB BLD-MCNC: 10.5 G/DL — LOW (ref 11.5–15.5)
IMM GRANULOCYTES NFR BLD AUTO: 1 % — SIGNIFICANT CHANGE UP (ref 0–1.5)
LYMPHOCYTES # BLD AUTO: 1.87 K/UL — SIGNIFICANT CHANGE UP (ref 1–3.3)
LYMPHOCYTES # BLD AUTO: 29.7 % — SIGNIFICANT CHANGE UP (ref 13–44)
MCHC RBC-ENTMCNC: 29.4 PG — SIGNIFICANT CHANGE UP (ref 27–34)
MCHC RBC-ENTMCNC: 34.4 G/DL — SIGNIFICANT CHANGE UP (ref 32–36)
MCV RBC AUTO: 85.4 FL — SIGNIFICANT CHANGE UP (ref 80–100)
MONOCYTES # BLD AUTO: 0.68 K/UL — SIGNIFICANT CHANGE UP (ref 0–0.9)
MONOCYTES NFR BLD AUTO: 10.8 % — SIGNIFICANT CHANGE UP (ref 2–14)
NEUTROPHILS # BLD AUTO: 3.61 K/UL — SIGNIFICANT CHANGE UP (ref 1.8–7.4)
NEUTROPHILS NFR BLD AUTO: 57.2 % — SIGNIFICANT CHANGE UP (ref 43–77)
NRBC # BLD: 0 /100 WBCS — SIGNIFICANT CHANGE UP (ref 0–0)
PLATELET # BLD AUTO: 227 K/UL — SIGNIFICANT CHANGE UP (ref 150–400)
RBC # BLD: 3.57 M/UL — LOW (ref 3.8–5.2)
RBC # FLD: 13.9 % — SIGNIFICANT CHANGE UP (ref 10.3–14.5)
WBC # BLD: 6.3 K/UL — SIGNIFICANT CHANGE UP (ref 3.8–10.5)
WBC # FLD AUTO: 6.3 K/UL — SIGNIFICANT CHANGE UP (ref 3.8–10.5)

## 2021-01-14 ENCOUNTER — APPOINTMENT (OUTPATIENT)
Dept: INFUSION THERAPY | Facility: HOSPITAL | Age: 61
End: 2021-01-14

## 2021-01-14 DIAGNOSIS — Z51.89 ENCOUNTER FOR OTHER SPECIFIED AFTERCARE: ICD-10-CM

## 2021-01-14 LAB
ALBUMIN SERPL ELPH-MCNC: 4.8 G/DL
ALP BLD-CCNC: 117 U/L
ALT SERPL-CCNC: 22 U/L
ANION GAP SERPL CALC-SCNC: 9 MMOL/L
AST SERPL-CCNC: 20 U/L
BILIRUB SERPL-MCNC: 0.2 MG/DL
BUN SERPL-MCNC: 18 MG/DL
CALCIUM SERPL-MCNC: 9.6 MG/DL
CANCER AG125 SERPL-ACNC: 9 U/ML
CHLORIDE SERPL-SCNC: 105 MMOL/L
CO2 SERPL-SCNC: 27 MMOL/L
CREAT SERPL-MCNC: 0.88 MG/DL
GLUCOSE SERPL-MCNC: 88 MG/DL
MAGNESIUM SERPL-MCNC: 1.8 MG/DL
POTASSIUM SERPL-SCNC: 4.3 MMOL/L
PROT SERPL-MCNC: 6.9 G/DL
SODIUM SERPL-SCNC: 141 MMOL/L

## 2021-01-25 ENCOUNTER — RESULT REVIEW (OUTPATIENT)
Age: 61
End: 2021-01-25

## 2021-01-25 ENCOUNTER — APPOINTMENT (OUTPATIENT)
Dept: HEMATOLOGY ONCOLOGY | Facility: CLINIC | Age: 61
End: 2021-01-25
Payer: COMMERCIAL

## 2021-01-25 LAB
BASOPHILS # BLD AUTO: 0.03 K/UL — SIGNIFICANT CHANGE UP (ref 0–0.2)
BASOPHILS NFR BLD AUTO: 0.3 % — SIGNIFICANT CHANGE UP (ref 0–2)
EOSINOPHIL # BLD AUTO: 0.02 K/UL — SIGNIFICANT CHANGE UP (ref 0–0.5)
EOSINOPHIL NFR BLD AUTO: 0.2 % — SIGNIFICANT CHANGE UP (ref 0–6)
HCT VFR BLD CALC: 32.5 % — LOW (ref 34.5–45)
HGB BLD-MCNC: 10.8 G/DL — LOW (ref 11.5–15.5)
IMM GRANULOCYTES NFR BLD AUTO: 1.9 % — HIGH (ref 0–1.5)
LYMPHOCYTES # BLD AUTO: 1.75 K/UL — SIGNIFICANT CHANGE UP (ref 1–3.3)
LYMPHOCYTES # BLD AUTO: 19.9 % — SIGNIFICANT CHANGE UP (ref 13–44)
MCHC RBC-ENTMCNC: 29 PG — SIGNIFICANT CHANGE UP (ref 27–34)
MCHC RBC-ENTMCNC: 33.2 G/DL — SIGNIFICANT CHANGE UP (ref 32–36)
MCV RBC AUTO: 87.4 FL — SIGNIFICANT CHANGE UP (ref 80–100)
MONOCYTES # BLD AUTO: 0.56 K/UL — SIGNIFICANT CHANGE UP (ref 0–0.9)
MONOCYTES NFR BLD AUTO: 6.4 % — SIGNIFICANT CHANGE UP (ref 2–14)
NEUTROPHILS # BLD AUTO: 6.27 K/UL — SIGNIFICANT CHANGE UP (ref 1.8–7.4)
NEUTROPHILS NFR BLD AUTO: 71.3 % — SIGNIFICANT CHANGE UP (ref 43–77)
NRBC # BLD: 0 /100 WBCS — SIGNIFICANT CHANGE UP (ref 0–0)
PLATELET # BLD AUTO: 161 K/UL — SIGNIFICANT CHANGE UP (ref 150–400)
RBC # BLD: 3.72 M/UL — LOW (ref 3.8–5.2)
RBC # FLD: 13.8 % — SIGNIFICANT CHANGE UP (ref 10.3–14.5)
WBC # BLD: 8.8 K/UL — SIGNIFICANT CHANGE UP (ref 3.8–10.5)
WBC # FLD AUTO: 8.8 K/UL — SIGNIFICANT CHANGE UP (ref 3.8–10.5)

## 2021-01-25 PROCEDURE — 99214 OFFICE O/P EST MOD 30 MIN: CPT | Mod: 95

## 2021-01-26 LAB
ALBUMIN SERPL ELPH-MCNC: 4.5 G/DL
ALP BLD-CCNC: 125 U/L
ALT SERPL-CCNC: 14 U/L
ANION GAP SERPL CALC-SCNC: 12 MMOL/L
AST SERPL-CCNC: 14 U/L
BILIRUB SERPL-MCNC: 0.3 MG/DL
BUN SERPL-MCNC: 14 MG/DL
CALCIUM SERPL-MCNC: 9.1 MG/DL
CANCER AG125 SERPL-ACNC: 8 U/ML
CHLORIDE SERPL-SCNC: 106 MMOL/L
CO2 SERPL-SCNC: 26 MMOL/L
CREAT SERPL-MCNC: 0.93 MG/DL
GLUCOSE SERPL-MCNC: 85 MG/DL
MAGNESIUM SERPL-MCNC: 2 MG/DL
POTASSIUM SERPL-SCNC: 4.6 MMOL/L
PROT SERPL-MCNC: 6.5 G/DL
SODIUM SERPL-SCNC: 144 MMOL/L

## 2021-01-27 ENCOUNTER — OUTPATIENT (OUTPATIENT)
Dept: OUTPATIENT SERVICES | Facility: HOSPITAL | Age: 61
LOS: 1 days | Discharge: HOME | End: 2021-01-27

## 2021-01-27 DIAGNOSIS — C54.1 MALIGNANT NEOPLASM OF ENDOMETRIUM: ICD-10-CM

## 2021-01-27 DIAGNOSIS — Z98.890 OTHER SPECIFIED POSTPROCEDURAL STATES: Chronic | ICD-10-CM

## 2021-01-27 DIAGNOSIS — Z90.49 ACQUIRED ABSENCE OF OTHER SPECIFIED PARTS OF DIGESTIVE TRACT: Chronic | ICD-10-CM

## 2021-01-27 DIAGNOSIS — Z98.891 HISTORY OF UTERINE SCAR FROM PREVIOUS SURGERY: Chronic | ICD-10-CM

## 2021-01-29 ENCOUNTER — OUTPATIENT (OUTPATIENT)
Dept: OUTPATIENT SERVICES | Facility: HOSPITAL | Age: 61
LOS: 1 days | End: 2021-01-29
Payer: COMMERCIAL

## 2021-01-29 ENCOUNTER — APPOINTMENT (OUTPATIENT)
Dept: INFUSION THERAPY | Facility: HOSPITAL | Age: 61
End: 2021-01-29

## 2021-01-29 ENCOUNTER — LABORATORY RESULT (OUTPATIENT)
Age: 61
End: 2021-01-29

## 2021-01-29 ENCOUNTER — APPOINTMENT (OUTPATIENT)
Dept: CT IMAGING | Facility: IMAGING CENTER | Age: 61
End: 2021-01-29
Payer: COMMERCIAL

## 2021-01-29 DIAGNOSIS — Z90.49 ACQUIRED ABSENCE OF OTHER SPECIFIED PARTS OF DIGESTIVE TRACT: Chronic | ICD-10-CM

## 2021-01-29 DIAGNOSIS — C54.1 MALIGNANT NEOPLASM OF ENDOMETRIUM: ICD-10-CM

## 2021-01-29 DIAGNOSIS — Z98.890 OTHER SPECIFIED POSTPROCEDURAL STATES: Chronic | ICD-10-CM

## 2021-01-29 DIAGNOSIS — Z98.891 HISTORY OF UTERINE SCAR FROM PREVIOUS SURGERY: Chronic | ICD-10-CM

## 2021-01-29 PROCEDURE — 74177 CT ABD & PELVIS W/CONTRAST: CPT | Mod: 26

## 2021-01-29 PROCEDURE — 74177 CT ABD & PELVIS W/CONTRAST: CPT

## 2021-02-02 ENCOUNTER — RESULT REVIEW (OUTPATIENT)
Age: 61
End: 2021-02-02

## 2021-02-02 ENCOUNTER — APPOINTMENT (OUTPATIENT)
Dept: INFUSION THERAPY | Facility: HOSPITAL | Age: 61
End: 2021-02-02

## 2021-02-02 DIAGNOSIS — R11.2 NAUSEA WITH VOMITING, UNSPECIFIED: ICD-10-CM

## 2021-02-02 DIAGNOSIS — Z51.11 ENCOUNTER FOR ANTINEOPLASTIC CHEMOTHERAPY: ICD-10-CM

## 2021-02-02 LAB
BASOPHILS # BLD AUTO: 0.02 K/UL — SIGNIFICANT CHANGE UP (ref 0–0.2)
BASOPHILS NFR BLD AUTO: 0.3 % — SIGNIFICANT CHANGE UP (ref 0–2)
EOSINOPHIL # BLD AUTO: 0.01 K/UL — SIGNIFICANT CHANGE UP (ref 0–0.5)
EOSINOPHIL NFR BLD AUTO: 0.2 % — SIGNIFICANT CHANGE UP (ref 0–6)
HCT VFR BLD CALC: 29.7 % — LOW (ref 34.5–45)
HGB BLD-MCNC: 10.3 G/DL — LOW (ref 11.5–15.5)
IMM GRANULOCYTES NFR BLD AUTO: 0.3 % — SIGNIFICANT CHANGE UP (ref 0–1.5)
LYMPHOCYTES # BLD AUTO: 1.63 K/UL — SIGNIFICANT CHANGE UP (ref 1–3.3)
LYMPHOCYTES # BLD AUTO: 27.3 % — SIGNIFICANT CHANGE UP (ref 13–44)
MCHC RBC-ENTMCNC: 29.4 PG — SIGNIFICANT CHANGE UP (ref 27–34)
MCHC RBC-ENTMCNC: 34.7 G/DL — SIGNIFICANT CHANGE UP (ref 32–36)
MCV RBC AUTO: 84.9 FL — SIGNIFICANT CHANGE UP (ref 80–100)
MONOCYTES # BLD AUTO: 0.67 K/UL — SIGNIFICANT CHANGE UP (ref 0–0.9)
MONOCYTES NFR BLD AUTO: 11.2 % — SIGNIFICANT CHANGE UP (ref 2–14)
NEUTROPHILS # BLD AUTO: 3.61 K/UL — SIGNIFICANT CHANGE UP (ref 1.8–7.4)
NEUTROPHILS NFR BLD AUTO: 60.7 % — SIGNIFICANT CHANGE UP (ref 43–77)
NRBC # BLD: 0 /100 WBCS — SIGNIFICANT CHANGE UP (ref 0–0)
PLATELET # BLD AUTO: 201 K/UL — SIGNIFICANT CHANGE UP (ref 150–400)
RBC # BLD: 3.5 M/UL — LOW (ref 3.8–5.2)
RBC # FLD: 14.3 % — SIGNIFICANT CHANGE UP (ref 10.3–14.5)
WBC # BLD: 5.96 K/UL — SIGNIFICANT CHANGE UP (ref 3.8–10.5)
WBC # FLD AUTO: 5.96 K/UL — SIGNIFICANT CHANGE UP (ref 3.8–10.5)

## 2021-02-02 NOTE — REVIEW OF SYSTEMS
[Negative] : Musculoskeletal [Neuropathy] : neuropathy [FreeTextEntry2] : grade 1 neuropathy of fingers and toes b/l, improved after this cycle  [de-identified] : mild constipation, occasional R sided/umbilical abdominal discomfort

## 2021-02-02 NOTE — ASSESSMENT
[FreeTextEntry1] : Ms. Manriquez is a 61 y/o postmenopausal F with history of stage IB endometrioid endometrial adenocarcinoma, grade 2 +LVSI, 17/20mm muscle invasion s/p RTLH/BSO, SLN, PPALND in 7/2018 followed by adjuvant vaginal brachytherapy completed in 11/2018, who was PHUONG until February 2020 when she was found to have recurrent metastatic disease, now biopsy-proven from new abdominal wall mass.\par \par She remains clinically stable with acceptable toxicities from chemotherapy. 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 Symptoms improved with reduction of chemotherapy doses and initiation of medical cannibis.\par \par Plan:\par -Interval bloodwork reviewed\par -proceed with carbo/abraxane and growth factor support, scheduled next week\par -plan for interval CT scans after next cycle \par -f/u palliative care for management of pain and neuropathy\par -f/u gyn onc Dr. Fuentes \par -RTC 3 weeks \par \par The patient is in agreement with the plan of care. All of her questions and concerns were addressed. \par \par

## 2021-02-02 NOTE — RESULTS/DATA
[FreeTextEntry1] : CT A/P 10/7/20\par \par FINDINGS:\par LOWER CHEST: Within normal limits.\par \par LIVER: Within normal limits.\par BILE DUCTS: Normal caliber.\par GALLBLADDER: Status post cholecystectomy.\par SPLEEN: Stable 1.3 cm hypervascular lesion in the spleen, likely representing hemangioma or hamartoma.\par PANCREAS: Within normal limits.\par ADRENALS: Within normal limits.\par KIDNEYS/URETERS: Within normal limits.\par \par BLADDER: Within normal limits.\par REPRODUCTIVE ORGANS: Status post hysterectomy.\par \par BOWEL: No bowel obstruction. Appendix is not visualized. No pericecal inflammatory changes.\par \par PERITONEUM: No ascites. Peritoneal metastatic disease is again noted. For reference: Left lower quadrant measuring 2.5 x 1.3 cm, decreased in size compared to the prior exam in which it measured 3.2 x 2.0 cm (2:79). Right lower quadrant measuring 1.9 x 2.4 cm, increased in size compared to the prior exam in which it measured 1.5 x 2.0 cm (2:87). There is a stable left-sided peritoneal implant which measures 2.6 x 2.1 cm, (2:86).\par \par VESSELS: Within normal limits.\par RETROPERITONEUM/LYMPH NODES: The bilateral obturator lymph nodes have decreased in size compared the prior exam, currently measuring 2.1 x 1.5 cm on the left and 1.2 x 0.9 cm on the right and on the prior exam they measured 2.4 x 2.0 cm and 2.1 x 1.3 cm. There is no significant retroperitoneal lymphadenopathy.\par ABDOMINAL WALL: There is a mass in the left rectus musculature which currently measures are 4.2 x 2.0 cm, decreased in size compared to the prior exam in which it measured 4.5 x 2.8 cm.\par \par BONES: Mild degenerative changes in the spine.\par \par IMPRESSION:\par Peritoneal metastatic disease as described above.\par Bilateral obturator lymph nodes have decreased in size compared to the prior exam.\par Mass in the left rectus musculature, decreased in size

## 2021-02-02 NOTE — HISTORY OF PRESENT ILLNESS
[de-identified] : Referred by Dr. Fuentes\par Telephone visit:\par Due to the COVID19 pandemic, this interview was conducted over the telephone, with the patient and her . I was not able to do a physical exam during this interview. 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 She is being referred to medical oncology for treatment considerations for recurrent metastatic disease.\par \par Ms. Manriquez initially presented to her GYN (Dr. Powell, 5/17/18) for postmenopausal bleeding that began shortly after Lap cholecystectomy (4/9/18). She underwent a Pelvic US that showed 10.8 x 6.7 x 6.1 cm uterus with fibroids. She underwent endometrial biopsy with Dr. English at [place] on 5/21/18. Pathology was significant for endometrial adenocarcinoma, endometroid type, FIGO grade 1 nuclear grade 2 with foci of squamous differentiation. \par \par MRI of the pelvis on 5/24/18 showed a large mass involving the lower uterine segment and body. There were no pathologically enlarged lymph nodes but there was a large heterogeneous lesion involving the left sacrum suspicious for osseous metastatic disease. She underwent biopsy of the left sacral lesion which was read as consistent with hemangioma to rule out metastatic disease.\par \par She subsequently underwent comprehensive surgical staging by Dr. Fuentes including RTLH, BSO, SLN, PPALND on 7/19/18. Pathology for significant for FIGO grade 1 nuclear grade 2 invasive adenocarcinoma with LVSI and 17/20 mm muscle invasion, stage IB. There was no cervical stromal invasion, lower uterine segment invasion, or uterine serosal involvement. All the sampled left pelvic, right pelvic and para-aortic nodes were free of cancer. \par \par Postoperatively she did well and received vaginal brachytherapy 2500 cGy in 5 fractions over 10 days completed 9/17/18 with Dr. Littlejohn of radiation oncology. \par \par She felt well without any concerning symptoms and was PHUONG under surveillance until she reported new left sided abdominal pain and a firmness. In retrospect, she thinks that she felt something different in her L abdomen/pelvis for the past 1-2 months. She followed up with Dr. Fuentes who palpated a 3cm left sided mass under robotic trochar incision suspicious for recurrence. \par \par CT CAP 2/15/19 demonstrated:\par New pulmonary nodule (two subcm nodules, new), external iliac lymphadenopathy, soft tissue intraperitoneal implants, and intramuscular enhancing nodules consistent with metastatic disease. \par \par PERITONEUM: Trace amount of pelvic fluid. There multiple new soft tissue masses, for instance \par adjacent to the descending colon along the left paracolic gutter measuring 4.6 x 3.1 x 2.2 cm and \par within the right lower quadrant measuring 3.6 x 3.4 x 1.8 cm. \par VESSELS: Within normal limits. \par RETROPERITONEUM/LYMPH NODES: Bilateral new enlarged external iliac lymph nodes (2:133) \par measuring up to 2.2 x 1.7 cm on the left \par ABDOMINAL WALL: Within the left rectus muscle there is an enhancing mass measuring 5.0 x 2.8 x \par 5.7 cm, with an additional nodule within the anterior subcutaneous tissues. There is an additional 2 \par cm mass abutting the inferior left rectus muscle. \par BONES: Within normal limits. \par \par She underwent US-guided abdominal wall mass biopsy on 3/6 by IR. Pathology review from the biopsy demonstrated metastatic adenocarcinoma, c/w metastasis from patient's known history of endometroid endometrial adenocarcinoma. \par \par Addendum\par Addendum issued to report results of additional immunostains\par P53:Wild type; Ki-67 is mildly elevated, WT1 is negative\par The cytomorphology in conjunction with the immunophenotype is consistent with metastases from a primary endometrioid endometrial adenocarcinoma.\par \par She has been referred to medical oncology to discuss systemic treatment for her recurrent endometrial cancer. She feels very well overall, although she continues to feel the intermittent L sided abdominal discomfort and pulling sensation. She is working full time and lives with her . She has a daughter. She has a good appetite and denies any weight changes.  [de-identified] : The patient is here for follow up, currently on chemotherapy with carbo/abraxane.\par Since her last visit she continues to feel well overall with good appetite and energy. She has been busy working from home, caring for her dog and managing all of her ADLs without issues. She has grade 1 fatigue which is stable. She has had increased neuropathic symptoms, increased numbness and "electric like" sensations occasionally. These symptoms were less severe with reduced doses of chemotherapy with this cycle. She has seen palliative care and started medical marijuana for neuropathy which has improved her symptoms in the hands and feet so far. Currently she is taking Tylenol and Tramadol for her pain, but lately has not had to use more than once nightly. She denies fevers, chills, nausea, vomiting, abdominal pain, urinary symptoms, vaginal bleeding/discharge, CP or SOB. \par  \par \par \par  PA spoke to MD.

## 2021-02-02 NOTE — PHYSICAL EXAM
[Restricted in physically strenuous activity but ambulatory and able to carry out work of a light or sedentary nature] : Status 1- Restricted in physically strenuous activity but ambulatory and able to carry out work of a light or sedentary nature, e.g., light house work, office work [Normal] : affect appropriate [de-identified] : +alopecia  [de-identified] : Normal respiratory effort and rate

## 2021-02-02 NOTE — ASSESSMENT
[FreeTextEntry1] : Ms. Manriquez is a 59 y/o postmenopausal F with history of stage IB endometrioid endometrial adenocarcinoma, grade 2 +LVSI, 17/20mm muscle invasion s/p RTLH/BSO, SLN, PPALND in 7/2018 followed by adjuvant vaginal brachytherapy completed in 11/2018, who was PHUONG until February 2020 when she was found to have recurrent metastatic disease, now biopsy-proven from new abdominal wall mass.\par \par She remains clinically stable with acceptable toxicities from chemotherapy. 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 Symptoms improved with reduction of chemotherapy doses and initiation of medical cannibis.\par \par Plan:\par -Interval bloodwork today\par -plan for interval CT scans prior to next cycle to assess response to therapy \par -f/u palliative care for management of pain and neuropathy, symptoms improving with medical cannibis\par -f/u gyn onc Dr. Fuentes \par -RTC 3 weeks with f/u scans \par \par The patient is in agreement with the plan of care. All of her questions and concerns were addressed. \par \par

## 2021-02-02 NOTE — HISTORY OF PRESENT ILLNESS
[de-identified] : Referred by Dr. Fuentes\par Telephone visit:\par Due to the COVID19 pandemic, this interview was conducted over the telephone, with the patient and her . I was not able to do a physical exam during this interview. 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 She is being referred to medical oncology for treatment considerations for recurrent metastatic disease.\par \par Ms. Manriquez initially presented to her GYN (Dr. Powell, 5/17/18) for postmenopausal bleeding that began shortly after Lap cholecystectomy (4/9/18). She underwent a Pelvic US that showed 10.8 x 6.7 x 6.1 cm uterus with fibroids. She underwent endometrial biopsy with Dr. English at [place] on 5/21/18. Pathology was significant for endometrial adenocarcinoma, endometroid type, FIGO grade 1 nuclear grade 2 with foci of squamous differentiation. \par \par MRI of the pelvis on 5/24/18 showed a large mass involving the lower uterine segment and body. There were no pathologically enlarged lymph nodes but there was a large heterogeneous lesion involving the left sacrum suspicious for osseous metastatic disease. She underwent biopsy of the left sacral lesion which was read as consistent with hemangioma to rule out metastatic disease.\par \par She subsequently underwent comprehensive surgical staging by Dr. Fuentes including RTLH, BSO, SLN, PPALND on 7/19/18. Pathology for significant for FIGO grade 1 nuclear grade 2 invasive adenocarcinoma with LVSI and 17/20 mm muscle invasion, stage IB. There was no cervical stromal invasion, lower uterine segment invasion, or uterine serosal involvement. All the sampled left pelvic, right pelvic and para-aortic nodes were free of cancer. \par \par Postoperatively she did well and received vaginal brachytherapy 2500 cGy in 5 fractions over 10 days completed 9/17/18 with Dr. Littlejohn of radiation oncology. \par \par She felt well without any concerning symptoms and was PHUONG under surveillance until she reported new left sided abdominal pain and a firmness. In retrospect, she thinks that she felt something different in her L abdomen/pelvis for the past 1-2 months. She followed up with Dr. Fuentes who palpated a 3cm left sided mass under robotic trochar incision suspicious for recurrence. \par \par CT CAP 2/15/19 demonstrated:\par New pulmonary nodule (two subcm nodules, new), external iliac lymphadenopathy, soft tissue intraperitoneal implants, and intramuscular enhancing nodules consistent with metastatic disease. \par \par PERITONEUM: Trace amount of pelvic fluid. There multiple new soft tissue masses, for instance \par adjacent to the descending colon along the left paracolic gutter measuring 4.6 x 3.1 x 2.2 cm and \par within the right lower quadrant measuring 3.6 x 3.4 x 1.8 cm. \par VESSELS: Within normal limits. \par RETROPERITONEUM/LYMPH NODES: Bilateral new enlarged external iliac lymph nodes (2:133) \par measuring up to 2.2 x 1.7 cm on the left \par ABDOMINAL WALL: Within the left rectus muscle there is an enhancing mass measuring 5.0 x 2.8 x \par 5.7 cm, with an additional nodule within the anterior subcutaneous tissues. There is an additional 2 \par cm mass abutting the inferior left rectus muscle. \par BONES: Within normal limits. \par \par She underwent US-guided abdominal wall mass biopsy on 3/6 by IR. Pathology review from the biopsy demonstrated metastatic adenocarcinoma, c/w metastasis from patient's known history of endometroid endometrial adenocarcinoma. \par \par Addendum\par Addendum issued to report results of additional immunostains\par P53:Wild type; Ki-67 is mildly elevated, WT1 is negative\par The cytomorphology in conjunction with the immunophenotype is consistent with metastases from a primary endometrioid endometrial adenocarcinoma.\par \par She has been referred to medical oncology to discuss systemic treatment for her recurrent endometrial cancer. She feels very well overall, although she continues to feel the intermittent L sided abdominal discomfort and pulling sensation. She is working full time and lives with her . She has a daughter. She has a good appetite and denies any weight changes.  [Home] : at home, [unfilled] , at the time of the visit. [Medical Office: ([facility])___] : at the medical office located in  [Verbal consent obtained from patient] : the patient, [unfilled] [de-identified] : The patient is here for follow up, currently on chemotherapy with carbo/abraxane. \par \par Since her last visit she continues to feel well overall with good appetite and energy. She has been busy working and managing all of her ADLs without issues. She has grade 1 fatigue which is stable. Neuropathy improving with medical marijuana in the hands and feet so far. Currently she is taking Tylenol and Tramadol for her pain, but lately has not had to use more than once nightly. She denies fevers, chills, nausea, vomiting, abdominal pain, urinary symptoms, vaginal bleeding/discharge, CP or SOB. \par  \par \par \par

## 2021-02-02 NOTE — REVIEW OF SYSTEMS
[Negative] : Musculoskeletal [Neuropathy] : neuropathy [FreeTextEntry2] : grade 1 neuropathy of fingers and toes b/l, improved after this cycle  [de-identified] : mild constipation, occasional R sided/umbilical abdominal discomfort

## 2021-02-02 NOTE — RESULTS/DATA
[FreeTextEntry1] : CT AP 10/7/20:\par \par FINDINGS:\par LOWER CHEST: Within normal limits.\par \par LIVER: Within normal limits.\par BILE DUCTS: Normal caliber.\par GALLBLADDER: Status post cholecystectomy.\par SPLEEN: Stable 1.3 cm hypervascular lesion in the spleen, likely representing hemangioma or hamartoma.\par PANCREAS: Within normal limits.\par ADRENALS: Within normal limits.\par KIDNEYS/URETERS: Within normal limits.\par \par BLADDER: Within normal limits.\par REPRODUCTIVE ORGANS: Status post hysterectomy.\par \par BOWEL: No bowel obstruction. Appendix is not visualized. No pericecal inflammatory changes.\par \par PERITONEUM: No ascites. Peritoneal metastatic disease is again noted. For reference: Left lower quadrant measuring 2.5 x 1.3 cm, decreased in size compared to the prior exam in which it measured 3.2 x 2.0 cm (2:79). Right lower quadrant measuring 1.9 x 2.4 cm, increased in size compared to the prior exam in which it measured 1.5 x 2.0 cm (2:87). There is a stable left-sided peritoneal implant which measures 2.6 x 2.1 cm, (2:86).\par \par VESSELS: Within normal limits.\par RETROPERITONEUM/LYMPH NODES: The bilateral obturator lymph nodes have decreased in size compared the prior exam, currently measuring 2.1 x 1.5 cm on the left and 1.2 x 0.9 cm on the right and on the prior exam they measured 2.4 x 2.0 cm and 2.1 x 1.3 cm. There is no significant retroperitoneal lymphadenopathy.\par ABDOMINAL WALL: There is a mass in the left rectus musculature which currently measures are 4.2 x 2.0 cm, decreased in size compared to the prior exam in which it measured 4.5 x 2.8 cm.\par \par BONES: Mild degenerative changes in the spine.\par \par IMPRESSION:\par Peritoneal metastatic disease as described above.\par Bilateral obturator lymph nodes have decreased in size compared to the prior exam.\par Mass in the left rectus musculature, decreased in size\par \par \par \par \par

## 2021-02-02 NOTE — PHYSICAL EXAM
[Restricted in physically strenuous activity but ambulatory and able to carry out work of a light or sedentary nature] : Status 1- Restricted in physically strenuous activity but ambulatory and able to carry out work of a light or sedentary nature, e.g., light house work, office work [Normal] : affect appropriate [de-identified] : +alopecia  [de-identified] : Normal respiratory effort and rate

## 2021-02-02 NOTE — HISTORY OF PRESENT ILLNESS
[Spouse] : spouse [Patient] : the patient [Self] : self [de-identified] : Referred by Dr. Fuentes\par Telephone visit:\par Due to the COVID19 pandemic, this interview was conducted over the telephone, with the patient and her . I was not able to do a physical exam during this interview. 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 She is being referred to medical oncology for treatment considerations for recurrent metastatic disease.\par \par Ms. Manriquez initially presented to her GYN (Dr. Powell, 5/17/18) for postmenopausal bleeding that began shortly after Lap cholecystectomy (4/9/18). She underwent a Pelvic US that showed 10.8 x 6.7 x 6.1 cm uterus with fibroids. She underwent endometrial biopsy with Dr. English at [place] on 5/21/18. Pathology was significant for endometrial adenocarcinoma, endometroid type, FIGO grade 1 nuclear grade 2 with foci of squamous differentiation. \par \par MRI of the pelvis on 5/24/18 showed a large mass involving the lower uterine segment and body. There were no pathologically enlarged lymph nodes but there was a large heterogeneous lesion involving the left sacrum suspicious for osseous metastatic disease. She underwent biopsy of the left sacral lesion which was read as consistent with hemangioma to rule out metastatic disease.\par \par She subsequently underwent comprehensive surgical staging by Dr. Fuentes including RTLH, BSO, SLN, PPALND on 7/19/18. Pathology for significant for FIGO grade 1 nuclear grade 2 invasive adenocarcinoma with LVSI and 17/20 mm muscle invasion, stage IB. There was no cervical stromal invasion, lower uterine segment invasion, or uterine serosal involvement. All the sampled left pelvic, right pelvic and para-aortic nodes were free of cancer. \par \par Postoperatively she did well and received vaginal brachytherapy 2500 cGy in 5 fractions over 10 days completed 9/17/18 with Dr. Littlejohn of radiation oncology. \par \par She felt well without any concerning symptoms and was PHUONG under surveillance until she reported new left sided abdominal pain and a firmness. In retrospect, she thinks that she felt something different in her L abdomen/pelvis for the past 1-2 months. She followed up with Dr. Fuentes who palpated a 3cm left sided mass under robotic trochar incision suspicious for recurrence. \par \par CT CAP 2/15/19 demonstrated:\par New pulmonary nodule (two subcm nodules, new), external iliac lymphadenopathy, soft tissue intraperitoneal implants, and intramuscular enhancing nodules consistent with metastatic disease. \par \par PERITONEUM: Trace amount of pelvic fluid. There multiple new soft tissue masses, for instance \par adjacent to the descending colon along the left paracolic gutter measuring 4.6 x 3.1 x 2.2 cm and \par within the right lower quadrant measuring 3.6 x 3.4 x 1.8 cm. \par VESSELS: Within normal limits. \par RETROPERITONEUM/LYMPH NODES: Bilateral new enlarged external iliac lymph nodes (2:133) \par measuring up to 2.2 x 1.7 cm on the left \par ABDOMINAL WALL: Within the left rectus muscle there is an enhancing mass measuring 5.0 x 2.8 x \par 5.7 cm, with an additional nodule within the anterior subcutaneous tissues. There is an additional 2 \par cm mass abutting the inferior left rectus muscle. \par BONES: Within normal limits. \par \par She underwent US-guided abdominal wall mass biopsy on 3/6 by IR. Pathology review from the biopsy demonstrated metastatic adenocarcinoma, c/w metastasis from patient's known history of endometroid endometrial adenocarcinoma. \par \par Addendum\par Addendum issued to report results of additional immunostains\par P53:Wild type; Ki-67 is mildly elevated, WT1 is negative\par The cytomorphology in conjunction with the immunophenotype is consistent with metastases from a primary endometrioid endometrial adenocarcinoma.\par \par She has been referred to medical oncology to discuss systemic treatment for her recurrent endometrial cancer. She feels very well overall, although she continues to feel the intermittent L sided abdominal discomfort and pulling sensation. She is working full time and lives with her . She has a daughter. She has a good appetite and denies any weight changes.  [Home] : at home, [unfilled] , at the time of the visit. [Medical Office: ([facility])___] : at the medical office located in  [Verbal consent obtained from patient] : the patient, [unfilled] [de-identified] : The patient is here for follow up, currently on chemotherapy with carbo/abraxane.\par Since her last visit she continues to feel well overall with good appetite and energy. She has been busy working from home, caring for her dog and managing all of her ADLs without issues. She has grade 1 fatigue which is stable. She has had increased neuropathic symptoms, increased numbness and "electric like" sensations occasionally. These symptoms were less severe with reduced doses of chemotherapy with this cycle. She has seen palliative care and started medical marijuana for neuropathy with improvement of neuropathy symtpoms. Abdominal pain controlled with once daily Tylenol and tramadol. She denies fevers, chills, nausea, vomiting, abdominal pain, urinary symptoms, vaginal bleeding/discharge, CP or SOB. \par  \par \par \par

## 2021-02-02 NOTE — PHYSICAL EXAM
[Restricted in physically strenuous activity but ambulatory and able to carry out work of a light or sedentary nature] : Status 1- Restricted in physically strenuous activity but ambulatory and able to carry out work of a light or sedentary nature, e.g., light house work, office work [Normal] : affect appropriate [de-identified] : +alopecia  [de-identified] : Normal respiratory effort and rate

## 2021-02-02 NOTE — ASSESSMENT
[FreeTextEntry1] : Ms. Manriquez is a 61 y/o postmenopausal F with history of stage IB endometrioid endometrial adenocarcinoma, grade 2 +LVSI, 17/20mm muscle invasion s/p RTLH/BSO, SLN, PPALND in 7/2018 followed by adjuvant vaginal brachytherapy completed in 11/2018, who was PHUONG until February 2020 when she was found to have recurrent metastatic disease, now biopsy-proven from new abdominal wall mass.\par \par She remains clinically stable with acceptable toxicities from chemotherapy. 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 Symptoms improved with reduction of chemotherapy doses and initiation of medical cannibis.\par \par Plan:\par -Interval bloodwork reviewed\par -proceed with carbo/abraxane and growth factor support as scheduled \par -plan for interval CT scans after next cycle \par -f/u palliative care for management of pain and neuropathy\par -f/u gyn onc Dr. Fuentes \par -RTC 3 weeks \par \par The patient is in agreement with the plan of care. All of her questions and concerns were addressed. \par \par

## 2021-02-02 NOTE — REVIEW OF SYSTEMS
[Negative] : Musculoskeletal [Neuropathy] : neuropathy [FreeTextEntry2] : grade 1 neuropathy of fingers and toes b/l, improved after this cycle  [de-identified] : mild constipation, occasional R sided/umbilical abdominal discomfort

## 2021-02-03 ENCOUNTER — NON-APPOINTMENT (OUTPATIENT)
Age: 61
End: 2021-02-03

## 2021-02-03 ENCOUNTER — APPOINTMENT (OUTPATIENT)
Dept: INFUSION THERAPY | Facility: HOSPITAL | Age: 61
End: 2021-02-03

## 2021-02-03 DIAGNOSIS — Z51.89 ENCOUNTER FOR OTHER SPECIFIED AFTERCARE: ICD-10-CM

## 2021-02-08 ENCOUNTER — APPOINTMENT (OUTPATIENT)
Dept: HEMATOLOGY ONCOLOGY | Facility: CLINIC | Age: 61
End: 2021-02-08
Payer: COMMERCIAL

## 2021-02-08 PROCEDURE — 99214 OFFICE O/P EST MOD 30 MIN: CPT | Mod: 95

## 2021-02-22 ENCOUNTER — APPOINTMENT (OUTPATIENT)
Dept: HEMATOLOGY ONCOLOGY | Facility: CLINIC | Age: 61
End: 2021-02-22

## 2021-02-22 ENCOUNTER — RESULT REVIEW (OUTPATIENT)
Age: 61
End: 2021-02-22

## 2021-02-22 LAB
BASOPHILS # BLD AUTO: 0.02 K/UL — SIGNIFICANT CHANGE UP (ref 0–0.2)
BASOPHILS NFR BLD AUTO: 0.4 % — SIGNIFICANT CHANGE UP (ref 0–2)
EOSINOPHIL # BLD AUTO: 0.01 K/UL — SIGNIFICANT CHANGE UP (ref 0–0.5)
EOSINOPHIL NFR BLD AUTO: 0.2 % — SIGNIFICANT CHANGE UP (ref 0–6)
HCT VFR BLD CALC: 28.9 % — LOW (ref 34.5–45)
HGB BLD-MCNC: 9.9 G/DL — LOW (ref 11.5–15.5)
IMM GRANULOCYTES NFR BLD AUTO: 0.6 % — SIGNIFICANT CHANGE UP (ref 0–1.5)
LYMPHOCYTES # BLD AUTO: 1.42 K/UL — SIGNIFICANT CHANGE UP (ref 1–3.3)
LYMPHOCYTES # BLD AUTO: 26.5 % — SIGNIFICANT CHANGE UP (ref 13–44)
MCHC RBC-ENTMCNC: 29.6 PG — SIGNIFICANT CHANGE UP (ref 27–34)
MCHC RBC-ENTMCNC: 34.3 G/DL — SIGNIFICANT CHANGE UP (ref 32–36)
MCV RBC AUTO: 86.3 FL — SIGNIFICANT CHANGE UP (ref 80–100)
MONOCYTES # BLD AUTO: 0.64 K/UL — SIGNIFICANT CHANGE UP (ref 0–0.9)
MONOCYTES NFR BLD AUTO: 11.9 % — SIGNIFICANT CHANGE UP (ref 2–14)
NEUTROPHILS # BLD AUTO: 3.24 K/UL — SIGNIFICANT CHANGE UP (ref 1.8–7.4)
NEUTROPHILS NFR BLD AUTO: 60.4 % — SIGNIFICANT CHANGE UP (ref 43–77)
NRBC # BLD: 0 /100 WBCS — SIGNIFICANT CHANGE UP (ref 0–0)
PLATELET # BLD AUTO: 198 K/UL — SIGNIFICANT CHANGE UP (ref 150–400)
RBC # BLD: 3.35 M/UL — LOW (ref 3.8–5.2)
RBC # FLD: 14.9 % — HIGH (ref 10.3–14.5)
WBC # BLD: 5.36 K/UL — SIGNIFICANT CHANGE UP (ref 3.8–10.5)
WBC # FLD AUTO: 5.36 K/UL — SIGNIFICANT CHANGE UP (ref 3.8–10.5)

## 2021-02-23 ENCOUNTER — LABORATORY RESULT (OUTPATIENT)
Age: 61
End: 2021-02-23

## 2021-02-23 ENCOUNTER — APPOINTMENT (OUTPATIENT)
Dept: INFUSION THERAPY | Facility: HOSPITAL | Age: 61
End: 2021-02-23

## 2021-02-23 ENCOUNTER — RESULT REVIEW (OUTPATIENT)
Age: 61
End: 2021-02-23

## 2021-02-23 LAB
ALBUMIN SERPL ELPH-MCNC: 4.5 G/DL
ALP BLD-CCNC: 105 U/L
ALT SERPL-CCNC: 18 U/L
ANION GAP SERPL CALC-SCNC: 10 MMOL/L
AST SERPL-CCNC: 17 U/L
BASOPHILS # BLD AUTO: 0.03 K/UL — SIGNIFICANT CHANGE UP (ref 0–0.2)
BASOPHILS NFR BLD AUTO: 0.5 % — SIGNIFICANT CHANGE UP (ref 0–2)
BILIRUB SERPL-MCNC: 0.3 MG/DL
BUN SERPL-MCNC: 17 MG/DL
CALCIUM SERPL-MCNC: 9.4 MG/DL
CANCER AG125 SERPL-ACNC: 7 U/ML
CHLORIDE SERPL-SCNC: 106 MMOL/L
CO2 SERPL-SCNC: 24 MMOL/L
CREAT SERPL-MCNC: 0.8 MG/DL
EOSINOPHIL # BLD AUTO: 0.02 K/UL — SIGNIFICANT CHANGE UP (ref 0–0.5)
EOSINOPHIL NFR BLD AUTO: 0.3 % — SIGNIFICANT CHANGE UP (ref 0–6)
GLUCOSE SERPL-MCNC: 89 MG/DL
HCT VFR BLD CALC: 28.4 % — LOW (ref 34.5–45)
HGB BLD-MCNC: 10 G/DL — LOW (ref 11.5–15.5)
IMM GRANULOCYTES NFR BLD AUTO: 0.9 % — SIGNIFICANT CHANGE UP (ref 0–1.5)
LYMPHOCYTES # BLD AUTO: 1.67 K/UL — SIGNIFICANT CHANGE UP (ref 1–3.3)
LYMPHOCYTES # BLD AUTO: 25.7 % — SIGNIFICANT CHANGE UP (ref 13–44)
MAGNESIUM SERPL-MCNC: 1.6 MG/DL
MCHC RBC-ENTMCNC: 30 PG — SIGNIFICANT CHANGE UP (ref 27–34)
MCHC RBC-ENTMCNC: 35.2 G/DL — SIGNIFICANT CHANGE UP (ref 32–36)
MCV RBC AUTO: 85.3 FL — SIGNIFICANT CHANGE UP (ref 80–100)
MONOCYTES # BLD AUTO: 0.69 K/UL — SIGNIFICANT CHANGE UP (ref 0–0.9)
MONOCYTES NFR BLD AUTO: 10.6 % — SIGNIFICANT CHANGE UP (ref 2–14)
NEUTROPHILS # BLD AUTO: 4.03 K/UL — SIGNIFICANT CHANGE UP (ref 1.8–7.4)
NEUTROPHILS NFR BLD AUTO: 62 % — SIGNIFICANT CHANGE UP (ref 43–77)
NRBC # BLD: 0 /100 WBCS — SIGNIFICANT CHANGE UP (ref 0–0)
PLATELET # BLD AUTO: 225 K/UL — SIGNIFICANT CHANGE UP (ref 150–400)
POTASSIUM SERPL-SCNC: 4.5 MMOL/L
PROT SERPL-MCNC: 6.4 G/DL
RBC # BLD: 3.33 M/UL — LOW (ref 3.8–5.2)
RBC # FLD: 14.8 % — HIGH (ref 10.3–14.5)
SODIUM SERPL-SCNC: 141 MMOL/L
WBC # BLD: 6.5 K/UL — SIGNIFICANT CHANGE UP (ref 3.8–10.5)
WBC # FLD AUTO: 6.5 K/UL — SIGNIFICANT CHANGE UP (ref 3.8–10.5)

## 2021-02-24 ENCOUNTER — APPOINTMENT (OUTPATIENT)
Dept: INFUSION THERAPY | Facility: HOSPITAL | Age: 61
End: 2021-02-24

## 2021-03-09 ENCOUNTER — OUTPATIENT (OUTPATIENT)
Dept: OUTPATIENT SERVICES | Facility: HOSPITAL | Age: 61
LOS: 1 days | Discharge: HOME | End: 2021-03-09

## 2021-03-09 DIAGNOSIS — Z90.49 ACQUIRED ABSENCE OF OTHER SPECIFIED PARTS OF DIGESTIVE TRACT: Chronic | ICD-10-CM

## 2021-03-09 DIAGNOSIS — Z98.891 HISTORY OF UTERINE SCAR FROM PREVIOUS SURGERY: Chronic | ICD-10-CM

## 2021-03-09 DIAGNOSIS — C54.1 MALIGNANT NEOPLASM OF ENDOMETRIUM: ICD-10-CM

## 2021-03-09 DIAGNOSIS — Z98.890 OTHER SPECIFIED POSTPROCEDURAL STATES: Chronic | ICD-10-CM

## 2021-03-10 ENCOUNTER — RESULT REVIEW (OUTPATIENT)
Age: 61
End: 2021-03-10

## 2021-03-10 ENCOUNTER — APPOINTMENT (OUTPATIENT)
Dept: HEMATOLOGY ONCOLOGY | Facility: CLINIC | Age: 61
End: 2021-03-10
Payer: COMMERCIAL

## 2021-03-10 LAB
BASOPHILS # BLD AUTO: 0.01 K/UL — SIGNIFICANT CHANGE UP (ref 0–0.2)
BASOPHILS NFR BLD AUTO: 0.2 % — SIGNIFICANT CHANGE UP (ref 0–2)
EOSINOPHIL # BLD AUTO: 0.05 K/UL — SIGNIFICANT CHANGE UP (ref 0–0.5)
EOSINOPHIL NFR BLD AUTO: 1.2 % — SIGNIFICANT CHANGE UP (ref 0–6)
HCT VFR BLD CALC: 27.1 % — LOW (ref 34.5–45)
HGB BLD-MCNC: 9.4 G/DL — LOW (ref 11.5–15.5)
IMM GRANULOCYTES NFR BLD AUTO: 1.6 % — HIGH (ref 0–1.5)
LYMPHOCYTES # BLD AUTO: 1.25 K/UL — SIGNIFICANT CHANGE UP (ref 1–3.3)
LYMPHOCYTES # BLD AUTO: 29.4 % — SIGNIFICANT CHANGE UP (ref 13–44)
MCHC RBC-ENTMCNC: 29.8 PG — SIGNIFICANT CHANGE UP (ref 27–34)
MCHC RBC-ENTMCNC: 34.7 G/DL — SIGNIFICANT CHANGE UP (ref 32–36)
MCV RBC AUTO: 86 FL — SIGNIFICANT CHANGE UP (ref 80–100)
MONOCYTES # BLD AUTO: 0.43 K/UL — SIGNIFICANT CHANGE UP (ref 0–0.9)
MONOCYTES NFR BLD AUTO: 10.1 % — SIGNIFICANT CHANGE UP (ref 2–14)
NEUTROPHILS # BLD AUTO: 2.44 K/UL — SIGNIFICANT CHANGE UP (ref 1.8–7.4)
NEUTROPHILS NFR BLD AUTO: 57.5 % — SIGNIFICANT CHANGE UP (ref 43–77)
NRBC # BLD: 0 /100 WBCS — SIGNIFICANT CHANGE UP (ref 0–0)
PLATELET # BLD AUTO: 139 K/UL — LOW (ref 150–400)
RBC # BLD: 3.15 M/UL — LOW (ref 3.8–5.2)
RBC # FLD: 14 % — SIGNIFICANT CHANGE UP (ref 10.3–14.5)
WBC # BLD: 4.25 K/UL — SIGNIFICANT CHANGE UP (ref 3.8–10.5)
WBC # FLD AUTO: 4.25 K/UL — SIGNIFICANT CHANGE UP (ref 3.8–10.5)

## 2021-03-10 PROCEDURE — 99213 OFFICE O/P EST LOW 20 MIN: CPT | Mod: 95

## 2021-03-16 ENCOUNTER — RESULT REVIEW (OUTPATIENT)
Age: 61
End: 2021-03-16

## 2021-03-16 ENCOUNTER — LABORATORY RESULT (OUTPATIENT)
Age: 61
End: 2021-03-16

## 2021-03-16 ENCOUNTER — APPOINTMENT (OUTPATIENT)
Dept: INFUSION THERAPY | Facility: HOSPITAL | Age: 61
End: 2021-03-16

## 2021-03-16 DIAGNOSIS — Z51.11 ENCOUNTER FOR ANTINEOPLASTIC CHEMOTHERAPY: ICD-10-CM

## 2021-03-16 DIAGNOSIS — R11.2 NAUSEA WITH VOMITING, UNSPECIFIED: ICD-10-CM

## 2021-03-16 LAB
BASOPHILS # BLD AUTO: 0.03 K/UL — SIGNIFICANT CHANGE UP (ref 0–0.2)
BASOPHILS NFR BLD AUTO: 0.6 % — SIGNIFICANT CHANGE UP (ref 0–2)
EOSINOPHIL # BLD AUTO: 0.02 K/UL — SIGNIFICANT CHANGE UP (ref 0–0.5)
EOSINOPHIL NFR BLD AUTO: 0.4 % — SIGNIFICANT CHANGE UP (ref 0–6)
HCT VFR BLD CALC: 28.2 % — LOW (ref 34.5–45)
HGB BLD-MCNC: 9.8 G/DL — LOW (ref 11.5–15.5)
IMM GRANULOCYTES NFR BLD AUTO: 0.8 % — SIGNIFICANT CHANGE UP (ref 0–1.5)
LYMPHOCYTES # BLD AUTO: 1.49 K/UL — SIGNIFICANT CHANGE UP (ref 1–3.3)
LYMPHOCYTES # BLD AUTO: 28.3 % — SIGNIFICANT CHANGE UP (ref 13–44)
MCHC RBC-ENTMCNC: 30.2 PG — SIGNIFICANT CHANGE UP (ref 27–34)
MCHC RBC-ENTMCNC: 34.8 G/DL — SIGNIFICANT CHANGE UP (ref 32–36)
MCV RBC AUTO: 86.8 FL — SIGNIFICANT CHANGE UP (ref 80–100)
MONOCYTES # BLD AUTO: 0.61 K/UL — SIGNIFICANT CHANGE UP (ref 0–0.9)
MONOCYTES NFR BLD AUTO: 11.6 % — SIGNIFICANT CHANGE UP (ref 2–14)
NEUTROPHILS # BLD AUTO: 3.08 K/UL — SIGNIFICANT CHANGE UP (ref 1.8–7.4)
NEUTROPHILS NFR BLD AUTO: 58.3 % — SIGNIFICANT CHANGE UP (ref 43–77)
NRBC # BLD: 0 /100 WBCS — SIGNIFICANT CHANGE UP (ref 0–0)
PLATELET # BLD AUTO: 211 K/UL — SIGNIFICANT CHANGE UP (ref 150–400)
RBC # BLD: 3.25 M/UL — LOW (ref 3.8–5.2)
RBC # FLD: 14 % — SIGNIFICANT CHANGE UP (ref 10.3–14.5)
WBC # BLD: 5.27 K/UL — SIGNIFICANT CHANGE UP (ref 3.8–10.5)
WBC # FLD AUTO: 5.27 K/UL — SIGNIFICANT CHANGE UP (ref 3.8–10.5)

## 2021-03-16 NOTE — PHYSICAL EXAM
[Restricted in physically strenuous activity but ambulatory and able to carry out work of a light or sedentary nature] : Status 1- Restricted in physically strenuous activity but ambulatory and able to carry out work of a light or sedentary nature, e.g., light house work, office work [Normal] : affect appropriate [de-identified] : +alopecia  [de-identified] : Normal respiratory effort and rate

## 2021-03-16 NOTE — ASSESSMENT
[FreeTextEntry1] : Ms. Manriquez is a 59 y/o postmenopausal F with history of stage IB endometrioid endometrial adenocarcinoma, grade 2 +LVSI, 17/20mm muscle invasion s/p RTLH/BSO, SLN, PPALND in 7/2018 followed by adjuvant vaginal brachytherapy completed in 11/2018, who was PHUONG until February 2020 when she was found to have recurrent metastatic disease, now biopsy-proven from new abdominal wall mass.\par \par She remains clinically stable with acceptable toxicities from chemotherapy. 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 Symptoms improved with reduction of chemotherapy doses and initiation of medical cannibis.\par \par We reviewed the CT scan findings from 1/28/21 that show interval decrease in peritoneal, retroperitoneal and abdominal wall disease. We discussed treatment options going forward, and patient would like to continue with further chemotherapy at this time. \par \par Plan:\par -Interval bloodwork scheduled \par -interim scans demonstrate continued response to current regimen, proceed with carboplatin/abraxane \par -f/u palliative care for pain and neuropathy, symptoms improving with medical cannibis\par -f/u gyn onc Dr. Fuentes \par -RTC 3 weeks \par \par The patient is in agreement with the plan of care. All of her questions and concerns were addressed. \par \par

## 2021-03-16 NOTE — RESULTS/DATA
[FreeTextEntry1] : CT A/P 1/28/21:\par \par FINDINGS:\par LOWER CHEST: Within normal limits.\par \par LIVER: Within normal limits.\par BILE DUCTS: Normal caliber.\par GALLBLADDER: Within normal limits.\par SPLEEN: Normal in size with a stable 1.5 cm hypervascular nodule, likely benign.\par PANCREAS: Within normal limits.\par ADRENALS: Within normal limits.\par KIDNEYS/URETERS: Within normal limits.\par \par BLADDER: Within normal limits.\par REPRODUCTIVE ORGANS: Hysterectomy. No adnexal mass.\par \par BOWEL: No bowel obstruction.\par PERITONEUM: No ascites. Peritoneal implants have decreased in size. Representative left lower quadrant nodule measures 1.2 x 1.2 cm (2, 80), previously 2.0 x 1.9 cm. An inferior left lower quadrant pelvic sidewall implant measures 2.0 x 1.8 cm (2, 85), previously 2.4 x 2.6 cm. A right lower quadrant implant measures 2.0 x 1.4 cm (2, 88)., previously 2.6 x 2.0 cm.\par VESSELS: Within normal limits.\par RETROPERITONEUM/LYMPH NODES: Decreased pelvic adenopathy. Representative left internal iliac adenopathy measures 1.7 x 1.2 cm (2, 91), previously 2.0 x 1.6 cm.\par ABDOMINAL WALL: A left rectus intramuscular mass measures 3.2 cm, previously 4.3 cm.\par BONES: Degenerative changes. A lytic lesion in the left sacrum is unchanged.\par \par IMPRESSION:\par Since 10/7/20, interval decrease in peritoneal, retroperitoneal and abdominal wall disease.\par

## 2021-03-16 NOTE — REVIEW OF SYSTEMS
[Negative] : Musculoskeletal [Neuropathy] : neuropathy [FreeTextEntry2] : grade 1 neuropathy of fingers and toes b/l, improved after this cycle  [de-identified] : mild constipation, occasional R sided/umbilical abdominal discomfort

## 2021-03-16 NOTE — HISTORY OF PRESENT ILLNESS
[Home] : at home, [unfilled] , at the time of the visit. [Medical Office: ([facility])___] : at the medical office located in  [Verbal consent obtained from patient] : the patient, [unfilled] [de-identified] : Referred by Dr. Fuentes\par Telephone visit:\par Due to the COVID19 pandemic, this interview was conducted over the telephone, with the patient and her . I was not able to do a physical exam during this interview. 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 She is being referred to medical oncology for treatment considerations for recurrent metastatic disease.\par \par Ms. Manriquez initially presented to her GYN (Dr. Powell, 5/17/18) for postmenopausal bleeding that began shortly after Lap cholecystectomy (4/9/18). She underwent a Pelvic US that showed 10.8 x 6.7 x 6.1 cm uterus with fibroids. She underwent endometrial biopsy with Dr. English at [place] on 5/21/18. Pathology was significant for endometrial adenocarcinoma, endometroid type, FIGO grade 1 nuclear grade 2 with foci of squamous differentiation. \par \par MRI of the pelvis on 5/24/18 showed a large mass involving the lower uterine segment and body. There were no pathologically enlarged lymph nodes but there was a large heterogeneous lesion involving the left sacrum suspicious for osseous metastatic disease. She underwent biopsy of the left sacral lesion which was read as consistent with hemangioma to rule out metastatic disease.\par \par She subsequently underwent comprehensive surgical staging by Dr. Fuentes including RTLH, BSO, SLN, PPALND on 7/19/18. Pathology for significant for FIGO grade 1 nuclear grade 2 invasive adenocarcinoma with LVSI and 17/20 mm muscle invasion, stage IB. There was no cervical stromal invasion, lower uterine segment invasion, or uterine serosal involvement. All the sampled left pelvic, right pelvic and para-aortic nodes were free of cancer. \par \par Postoperatively she did well and received vaginal brachytherapy 2500 cGy in 5 fractions over 10 days completed 9/17/18 with Dr. Littlejohn of radiation oncology. \par \par She felt well without any concerning symptoms and was PHUONG under surveillance until she reported new left sided abdominal pain and a firmness. In retrospect, she thinks that she felt something different in her L abdomen/pelvis for the past 1-2 months. She followed up with Dr. Fuentes who palpated a 3cm left sided mass under robotic trochar incision suspicious for recurrence. \par \par CT CAP 2/15/19 demonstrated:\par New pulmonary nodule (two subcm nodules, new), external iliac lymphadenopathy, soft tissue intraperitoneal implants, and intramuscular enhancing nodules consistent with metastatic disease. \par \par PERITONEUM: Trace amount of pelvic fluid. There multiple new soft tissue masses, for instance \par adjacent to the descending colon along the left paracolic gutter measuring 4.6 x 3.1 x 2.2 cm and \par within the right lower quadrant measuring 3.6 x 3.4 x 1.8 cm. \par VESSELS: Within normal limits. \par RETROPERITONEUM/LYMPH NODES: Bilateral new enlarged external iliac lymph nodes (2:133) \par measuring up to 2.2 x 1.7 cm on the left \par ABDOMINAL WALL: Within the left rectus muscle there is an enhancing mass measuring 5.0 x 2.8 x \par 5.7 cm, with an additional nodule within the anterior subcutaneous tissues. There is an additional 2 \par cm mass abutting the inferior left rectus muscle. \par BONES: Within normal limits. \par \par She underwent US-guided abdominal wall mass biopsy on 3/6 by IR. Pathology review from the biopsy demonstrated metastatic adenocarcinoma, c/w metastasis from patient's known history of endometroid endometrial adenocarcinoma. \par \par Addendum\par Addendum issued to report results of additional immunostains\par P53:Wild type; Ki-67 is mildly elevated, WT1 is negative\par The cytomorphology in conjunction with the immunophenotype is consistent with metastases from a primary endometrioid endometrial adenocarcinoma.\par \par She has been referred to medical oncology to discuss systemic treatment for her recurrent endometrial cancer. She feels very well overall, although she continues to feel the intermittent L sided abdominal discomfort and pulling sensation. She is working full time and lives with her . She has a daughter. She has a good appetite and denies any weight changes.  [de-identified] : The patient is here for follow up, currently on chemotherapy with carbo/abraxane. \par \par Since her last visit she continues to feel well overall with good appetite and energy. She has been busy working and managing all of her ADLs without issues. She has grade 1 fatigue which is stable. Neuropathy improving with medical marijuana in the hands and feet so far. Currently she is taking Tylenol and Tramadol for her pain, but lately has not had to use more than once nightly. She denies fevers, chills, nausea, vomiting, abdominal pain, urinary symptoms, vaginal bleeding/discharge, CP or SOB. \par  \par \par \par

## 2021-03-17 ENCOUNTER — APPOINTMENT (OUTPATIENT)
Dept: INFUSION THERAPY | Facility: HOSPITAL | Age: 61
End: 2021-03-17

## 2021-03-17 ENCOUNTER — NON-APPOINTMENT (OUTPATIENT)
Age: 61
End: 2021-03-17

## 2021-03-17 DIAGNOSIS — Z51.89 ENCOUNTER FOR OTHER SPECIFIED AFTERCARE: ICD-10-CM

## 2021-03-29 ENCOUNTER — APPOINTMENT (OUTPATIENT)
Dept: HEMATOLOGY ONCOLOGY | Facility: CLINIC | Age: 61
End: 2021-03-29

## 2021-03-31 ENCOUNTER — APPOINTMENT (OUTPATIENT)
Dept: HEMATOLOGY ONCOLOGY | Facility: CLINIC | Age: 61
End: 2021-03-31

## 2021-04-01 NOTE — RESULTS/DATA
[FreeTextEntry1] : CT A/P 1/29/21:\par \par FINDINGS:\par LOWER CHEST: Within normal limits.\par \par LIVER: Within normal limits.\par BILE DUCTS: Normal caliber.\par GALLBLADDER: Within normal limits.\par SPLEEN: Normal in size with a stable 1.5 cm hypervascular nodule, likely benign.\par PANCREAS: Within normal limits.\par ADRENALS: Within normal limits.\par KIDNEYS/URETERS: Within normal limits.\par \par BLADDER: Within normal limits.\par REPRODUCTIVE ORGANS: Hysterectomy. No adnexal mass.\par \par BOWEL: No bowel obstruction.\par PERITONEUM: No ascites. Peritoneal implants have decreased in size. Representative left lower quadrant nodule measures 1.2 x 1.2 cm (2, 80), previously 2.0 x 1.9 cm. An inferior left lower quadrant pelvic sidewall implant measures 2.0 x 1.8 cm (2, 85), previously 2.4 x 2.6 cm. A right lower quadrant implant measures 2.0 x 1.4 cm (2, 88)., previously 2.6 x 2.0 cm.\par VESSELS: Within normal limits.\par RETROPERITONEUM/LYMPH NODES: Decreased pelvic adenopathy. Representative left internal iliac adenopathy measures 1.7 x 1.2 cm (2, 91), previously 2.0 x 1.6 cm.\par ABDOMINAL WALL: A left rectus intramuscular mass measures 3.2 cm, previously 4.3 cm.\par BONES: Degenerative changes. A lytic lesion in the left sacrum is unchanged.\par \par IMPRESSION:\par Since 10/7/20, interval decrease in peritoneal, retroperitoneal and abdominal wall disease.\par

## 2021-04-01 NOTE — PHYSICAL EXAM
[Restricted in physically strenuous activity but ambulatory and able to carry out work of a light or sedentary nature] : Status 1- Restricted in physically strenuous activity but ambulatory and able to carry out work of a light or sedentary nature, e.g., light house work, office work [Normal] : affect appropriate [de-identified] : +alopecia  [de-identified] : Normal respiratory effort and rate

## 2021-04-01 NOTE — REVIEW OF SYSTEMS
[Negative] : Musculoskeletal [Neuropathy] : neuropathy [FreeTextEntry2] : grade 1 neuropathy of fingers and toes b/l, improved after this cycle  [de-identified] : mild constipation, occasional R sided/umbilical abdominal discomfort

## 2021-04-01 NOTE — HISTORY OF PRESENT ILLNESS
[Home] : at home, [unfilled] , at the time of the visit. [Medical Office: ([facility])___] : at the medical office located in  [Verbal consent obtained from patient] : the patient, [unfilled] [de-identified] : Referred by Dr. Fuentes\par Telephone visit:\par Due to the COVID19 pandemic, this interview was conducted over the telephone, with the patient and her . I was not able to do a physical exam during this interview. 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 She is being referred to medical oncology for treatment considerations for recurrent metastatic disease.\par \par Ms. Manriquez initially presented to her GYN (Dr. Powell, 5/17/18) for postmenopausal bleeding that began shortly after Lap cholecystectomy (4/9/18). She underwent a Pelvic US that showed 10.8 x 6.7 x 6.1 cm uterus with fibroids. She underwent endometrial biopsy with Dr. English at [place] on 5/21/18. Pathology was significant for endometrial adenocarcinoma, endometroid type, FIGO grade 1 nuclear grade 2 with foci of squamous differentiation. \par \par MRI of the pelvis on 5/24/18 showed a large mass involving the lower uterine segment and body. There were no pathologically enlarged lymph nodes but there was a large heterogeneous lesion involving the left sacrum suspicious for osseous metastatic disease. She underwent biopsy of the left sacral lesion which was read as consistent with hemangioma to rule out metastatic disease.\par \par She subsequently underwent comprehensive surgical staging by Dr. Fuentes including RTLH, BSO, SLN, PPALND on 7/19/18. Pathology for significant for FIGO grade 1 nuclear grade 2 invasive adenocarcinoma with LVSI and 17/20 mm muscle invasion, stage IB. There was no cervical stromal invasion, lower uterine segment invasion, or uterine serosal involvement. All the sampled left pelvic, right pelvic and para-aortic nodes were free of cancer. \par \par Postoperatively she did well and received vaginal brachytherapy 2500 cGy in 5 fractions over 10 days completed 9/17/18 with Dr. Littlejohn of radiation oncology. \par \par She felt well without any concerning symptoms and was PHUONG under surveillance until she reported new left sided abdominal pain and a firmness. In retrospect, she thinks that she felt something different in her L abdomen/pelvis for the past 1-2 months. She followed up with Dr. Fuentes who palpated a 3cm left sided mass under robotic trochar incision suspicious for recurrence. \par \par CT CAP 2/15/19 demonstrated:\par New pulmonary nodule (two subcm nodules, new), external iliac lymphadenopathy, soft tissue intraperitoneal implants, and intramuscular enhancing nodules consistent with metastatic disease. \par \par PERITONEUM: Trace amount of pelvic fluid. There multiple new soft tissue masses, for instance \par adjacent to the descending colon along the left paracolic gutter measuring 4.6 x 3.1 x 2.2 cm and \par within the right lower quadrant measuring 3.6 x 3.4 x 1.8 cm. \par VESSELS: Within normal limits. \par RETROPERITONEUM/LYMPH NODES: Bilateral new enlarged external iliac lymph nodes (2:133) \par measuring up to 2.2 x 1.7 cm on the left \par ABDOMINAL WALL: Within the left rectus muscle there is an enhancing mass measuring 5.0 x 2.8 x \par 5.7 cm, with an additional nodule within the anterior subcutaneous tissues. There is an additional 2 \par cm mass abutting the inferior left rectus muscle. \par BONES: Within normal limits. \par \par She underwent US-guided abdominal wall mass biopsy on 3/6 by IR. Pathology review from the biopsy demonstrated metastatic adenocarcinoma, c/w metastasis from patient's known history of endometroid endometrial adenocarcinoma. \par \par Addendum\par Addendum issued to report results of additional immunostains\par P53:Wild type; Ki-67 is mildly elevated, WT1 is negative\par The cytomorphology in conjunction with the immunophenotype is consistent with metastases from a primary endometrioid endometrial adenocarcinoma.\par \par She has been referred to medical oncology to discuss systemic treatment for her recurrent endometrial cancer. She feels very well overall, although she continues to feel the intermittent L sided abdominal discomfort and pulling sensation. She is working full time and lives with her . She has a daughter. She has a good appetite and denies any weight changes.  [de-identified] : The patient is here for follow up, currently on chemotherapy with carbo/abraxane. \par Since her last visit she continues to feel well overall with good appetite and energy. No significant changes in her symptoms. She has good energy and continues to stay active and is working from home. Stage grade 1 fatigue and neuropathy of the hands and feet. Using gabapentin and medical marijuana occasionally with good effect. \par \par She denies fevers, chills, nausea, vomiting, abdominal pain, urinary symptoms, vaginal bleeding/discharge, CP or SOB. \par  \par \par \par

## 2021-04-01 NOTE — ASSESSMENT
[FreeTextEntry1] : Ms. Manriquez is a 59 y/o postmenopausal F with history of stage IB endometrioid endometrial adenocarcinoma, grade 2 +LVSI, 17/20mm muscle invasion s/p RTLH/BSO, SLN, PPALND in 7/2018 followed by adjuvant vaginal brachytherapy completed in 11/2018, who was PHUONG until February 2020 when she was found to have recurrent metastatic disease, now biopsy-proven from new abdominal wall mass.\par \par Her most recent CT scans from 1/29/21 showed continued response to current chemotherapy regimen with interval decrease in peritoneal, retroperitoneal and abdominal wall disease.\par \par She remains clinically stable with acceptable toxicities from chemotherapy. 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 Symptoms improved with reduction of chemotherapy doses and initiation of medical cannibis\par \par Plan:\par -Interval bloodwork reviewed with patient \par -plan for interval CT scans after 1 additional cycle of carbo/abraxane to assess response to therapy \par -f/u palliative care for management of pain and neuropathy, symptoms improving with medical cannibis\par -f/u gyn onc Dr. Fuentes \par -RTC 3 weeks \par \par The patient is in agreement with the plan of care. All of her questions and concerns were addressed. \par \par

## 2021-04-12 ENCOUNTER — LABORATORY RESULT (OUTPATIENT)
Age: 61
End: 2021-04-12

## 2021-04-12 ENCOUNTER — OUTPATIENT (OUTPATIENT)
Dept: OUTPATIENT SERVICES | Facility: HOSPITAL | Age: 61
LOS: 1 days | Discharge: HOME | End: 2021-04-12

## 2021-04-12 ENCOUNTER — RESULT REVIEW (OUTPATIENT)
Age: 61
End: 2021-04-12

## 2021-04-12 ENCOUNTER — APPOINTMENT (OUTPATIENT)
Dept: INFUSION THERAPY | Facility: HOSPITAL | Age: 61
End: 2021-04-12

## 2021-04-12 DIAGNOSIS — Z90.49 ACQUIRED ABSENCE OF OTHER SPECIFIED PARTS OF DIGESTIVE TRACT: Chronic | ICD-10-CM

## 2021-04-12 DIAGNOSIS — R11.2 NAUSEA WITH VOMITING, UNSPECIFIED: ICD-10-CM

## 2021-04-12 DIAGNOSIS — Z51.11 ENCOUNTER FOR ANTINEOPLASTIC CHEMOTHERAPY: ICD-10-CM

## 2021-04-12 DIAGNOSIS — Z98.891 HISTORY OF UTERINE SCAR FROM PREVIOUS SURGERY: Chronic | ICD-10-CM

## 2021-04-12 DIAGNOSIS — C54.1 MALIGNANT NEOPLASM OF ENDOMETRIUM: ICD-10-CM

## 2021-04-12 DIAGNOSIS — Z98.890 OTHER SPECIFIED POSTPROCEDURAL STATES: Chronic | ICD-10-CM

## 2021-04-12 LAB
BASOPHILS # BLD AUTO: 0.02 K/UL — SIGNIFICANT CHANGE UP (ref 0–0.2)
BASOPHILS NFR BLD AUTO: 0.4 % — SIGNIFICANT CHANGE UP (ref 0–2)
EOSINOPHIL # BLD AUTO: 0.06 K/UL — SIGNIFICANT CHANGE UP (ref 0–0.5)
EOSINOPHIL NFR BLD AUTO: 1.1 % — SIGNIFICANT CHANGE UP (ref 0–6)
HCT VFR BLD CALC: 29.7 % — LOW (ref 34.5–45)
HGB BLD-MCNC: 10.5 G/DL — LOW (ref 11.5–15.5)
IMM GRANULOCYTES NFR BLD AUTO: 1.3 % — SIGNIFICANT CHANGE UP (ref 0–1.5)
LYMPHOCYTES # BLD AUTO: 1.74 K/UL — SIGNIFICANT CHANGE UP (ref 1–3.3)
LYMPHOCYTES # BLD AUTO: 32.2 % — SIGNIFICANT CHANGE UP (ref 13–44)
MCHC RBC-ENTMCNC: 30.4 PG — SIGNIFICANT CHANGE UP (ref 27–34)
MCHC RBC-ENTMCNC: 35.4 G/DL — SIGNIFICANT CHANGE UP (ref 32–36)
MCV RBC AUTO: 86.1 FL — SIGNIFICANT CHANGE UP (ref 80–100)
MONOCYTES # BLD AUTO: 0.58 K/UL — SIGNIFICANT CHANGE UP (ref 0–0.9)
MONOCYTES NFR BLD AUTO: 10.7 % — SIGNIFICANT CHANGE UP (ref 2–14)
NEUTROPHILS # BLD AUTO: 2.93 K/UL — SIGNIFICANT CHANGE UP (ref 1.8–7.4)
NEUTROPHILS NFR BLD AUTO: 54.3 % — SIGNIFICANT CHANGE UP (ref 43–77)
NRBC # BLD: 0 /100 WBCS — SIGNIFICANT CHANGE UP (ref 0–0)
PLATELET # BLD AUTO: 231 K/UL — SIGNIFICANT CHANGE UP (ref 150–400)
RBC # BLD: 3.45 M/UL — LOW (ref 3.8–5.2)
RBC # FLD: 13.3 % — SIGNIFICANT CHANGE UP (ref 10.3–14.5)
WBC # BLD: 5.4 K/UL — SIGNIFICANT CHANGE UP (ref 3.8–10.5)
WBC # FLD AUTO: 5.4 K/UL — SIGNIFICANT CHANGE UP (ref 3.8–10.5)

## 2021-04-13 ENCOUNTER — APPOINTMENT (OUTPATIENT)
Dept: INFUSION THERAPY | Facility: HOSPITAL | Age: 61
End: 2021-04-13

## 2021-04-13 ENCOUNTER — RESULT REVIEW (OUTPATIENT)
Age: 61
End: 2021-04-13

## 2021-04-13 DIAGNOSIS — Z51.89 ENCOUNTER FOR OTHER SPECIFIED AFTERCARE: ICD-10-CM

## 2021-04-13 DIAGNOSIS — E83.42 HYPOMAGNESEMIA: ICD-10-CM

## 2021-04-13 RX ORDER — MAGNESIUM OXIDE 241.3 MG/1000MG
400 TABLET ORAL
Qty: 14 | Refills: 1 | Status: ACTIVE | COMMUNITY
Start: 2021-04-13 | End: 1900-01-01

## 2021-04-14 ENCOUNTER — NON-APPOINTMENT (OUTPATIENT)
Age: 61
End: 2021-04-14

## 2021-04-23 ENCOUNTER — OUTPATIENT (OUTPATIENT)
Dept: OUTPATIENT SERVICES | Facility: HOSPITAL | Age: 61
LOS: 1 days | End: 2021-04-23
Payer: COMMERCIAL

## 2021-04-23 ENCOUNTER — APPOINTMENT (OUTPATIENT)
Dept: CT IMAGING | Facility: IMAGING CENTER | Age: 61
End: 2021-04-23
Payer: COMMERCIAL

## 2021-04-23 DIAGNOSIS — Z98.890 OTHER SPECIFIED POSTPROCEDURAL STATES: Chronic | ICD-10-CM

## 2021-04-23 DIAGNOSIS — Z00.8 ENCOUNTER FOR OTHER GENERAL EXAMINATION: ICD-10-CM

## 2021-04-23 DIAGNOSIS — Z90.49 ACQUIRED ABSENCE OF OTHER SPECIFIED PARTS OF DIGESTIVE TRACT: Chronic | ICD-10-CM

## 2021-04-23 DIAGNOSIS — Z98.891 HISTORY OF UTERINE SCAR FROM PREVIOUS SURGERY: Chronic | ICD-10-CM

## 2021-04-23 DIAGNOSIS — C54.1 MALIGNANT NEOPLASM OF ENDOMETRIUM: ICD-10-CM

## 2021-04-23 PROCEDURE — 74177 CT ABD & PELVIS W/CONTRAST: CPT

## 2021-04-23 PROCEDURE — 74177 CT ABD & PELVIS W/CONTRAST: CPT | Mod: 26

## 2021-04-27 ENCOUNTER — APPOINTMENT (OUTPATIENT)
Dept: HEMATOLOGY ONCOLOGY | Facility: CLINIC | Age: 61
End: 2021-04-27
Payer: COMMERCIAL

## 2021-04-27 PROCEDURE — 99213 OFFICE O/P EST LOW 20 MIN: CPT | Mod: 95

## 2021-05-03 ENCOUNTER — APPOINTMENT (OUTPATIENT)
Dept: HEMATOLOGY ONCOLOGY | Facility: CLINIC | Age: 61
End: 2021-05-03

## 2021-05-03 ENCOUNTER — RESULT REVIEW (OUTPATIENT)
Age: 61
End: 2021-05-03

## 2021-05-03 LAB
BASOPHILS # BLD AUTO: 0.04 K/UL — SIGNIFICANT CHANGE UP (ref 0–0.2)
BASOPHILS NFR BLD AUTO: 1 % — SIGNIFICANT CHANGE UP (ref 0–2)
EOSINOPHIL # BLD AUTO: 0 K/UL — SIGNIFICANT CHANGE UP (ref 0–0.5)
EOSINOPHIL NFR BLD AUTO: 0 % — SIGNIFICANT CHANGE UP (ref 0–6)
HCT VFR BLD CALC: 31.1 % — LOW (ref 34.5–45)
HGB BLD-MCNC: 10.3 G/DL — LOW (ref 11.5–15.5)
LYMPHOCYTES # BLD AUTO: 1.02 K/UL — SIGNIFICANT CHANGE UP (ref 1–3.3)
LYMPHOCYTES # BLD AUTO: 27 % — SIGNIFICANT CHANGE UP (ref 13–44)
MCHC RBC-ENTMCNC: 29.3 PG — SIGNIFICANT CHANGE UP (ref 27–34)
MCHC RBC-ENTMCNC: 33.1 G/DL — SIGNIFICANT CHANGE UP (ref 32–36)
MCV RBC AUTO: 88.6 FL — SIGNIFICANT CHANGE UP (ref 80–100)
MONOCYTES # BLD AUTO: 0.3 K/UL — SIGNIFICANT CHANGE UP (ref 0–0.9)
MONOCYTES NFR BLD AUTO: 8 % — SIGNIFICANT CHANGE UP (ref 2–14)
NEUTROPHILS # BLD AUTO: 2.43 K/UL — SIGNIFICANT CHANGE UP (ref 1.8–7.4)
NEUTROPHILS NFR BLD AUTO: 64 % — SIGNIFICANT CHANGE UP (ref 43–77)
NRBC # BLD: 0 /100 — SIGNIFICANT CHANGE UP (ref 0–0)
NRBC # BLD: SIGNIFICANT CHANGE UP /100 WBCS (ref 0–0)
PLAT MORPH BLD: NORMAL — SIGNIFICANT CHANGE UP
PLATELET # BLD AUTO: 240 K/UL — SIGNIFICANT CHANGE UP (ref 150–400)
RBC # BLD: 3.51 M/UL — LOW (ref 3.8–5.2)
RBC # FLD: 13.6 % — SIGNIFICANT CHANGE UP (ref 10.3–14.5)
RBC BLD AUTO: SIGNIFICANT CHANGE UP
WBC # BLD: 3.79 K/UL — LOW (ref 3.8–10.5)
WBC # FLD AUTO: 3.79 K/UL — LOW (ref 3.8–10.5)

## 2021-05-04 LAB
ALBUMIN SERPL ELPH-MCNC: 4.4 G/DL
ALP BLD-CCNC: 113 U/L
ALT SERPL-CCNC: 17 U/L
ANION GAP SERPL CALC-SCNC: 12 MMOL/L
AST SERPL-CCNC: 17 U/L
BILIRUB SERPL-MCNC: 0.3 MG/DL
BUN SERPL-MCNC: 22 MG/DL
CALCIUM SERPL-MCNC: 9.3 MG/DL
CANCER AG125 SERPL-ACNC: 6 U/ML
CHLORIDE SERPL-SCNC: 107 MMOL/L
CO2 SERPL-SCNC: 24 MMOL/L
CREAT SERPL-MCNC: 0.76 MG/DL
GLUCOSE SERPL-MCNC: 87 MG/DL
MAGNESIUM SERPL-MCNC: 1.6 MG/DL
POTASSIUM SERPL-SCNC: 4 MMOL/L
PROT SERPL-MCNC: 6.5 G/DL
SODIUM SERPL-SCNC: 144 MMOL/L

## 2021-05-10 ENCOUNTER — APPOINTMENT (OUTPATIENT)
Dept: INFUSION THERAPY | Facility: HOSPITAL | Age: 61
End: 2021-05-10

## 2021-05-10 ENCOUNTER — RESULT REVIEW (OUTPATIENT)
Age: 61
End: 2021-05-10

## 2021-05-10 LAB
BASOPHILS # BLD AUTO: 0.01 K/UL — SIGNIFICANT CHANGE UP (ref 0–0.2)
BASOPHILS NFR BLD AUTO: 0.2 % — SIGNIFICANT CHANGE UP (ref 0–2)
EOSINOPHIL # BLD AUTO: 0.04 K/UL — SIGNIFICANT CHANGE UP (ref 0–0.5)
EOSINOPHIL NFR BLD AUTO: 0.8 % — SIGNIFICANT CHANGE UP (ref 0–6)
HCT VFR BLD CALC: 31.3 % — LOW (ref 34.5–45)
HGB BLD-MCNC: 10.6 G/DL — LOW (ref 11.5–15.5)
IMM GRANULOCYTES NFR BLD AUTO: 1.3 % — SIGNIFICANT CHANGE UP (ref 0–1.5)
LYMPHOCYTES # BLD AUTO: 1.59 K/UL — SIGNIFICANT CHANGE UP (ref 1–3.3)
LYMPHOCYTES # BLD AUTO: 30.1 % — SIGNIFICANT CHANGE UP (ref 13–44)
MCHC RBC-ENTMCNC: 29.3 PG — SIGNIFICANT CHANGE UP (ref 27–34)
MCHC RBC-ENTMCNC: 33.9 G/DL — SIGNIFICANT CHANGE UP (ref 32–36)
MCV RBC AUTO: 86.5 FL — SIGNIFICANT CHANGE UP (ref 80–100)
MONOCYTES # BLD AUTO: 0.48 K/UL — SIGNIFICANT CHANGE UP (ref 0–0.9)
MONOCYTES NFR BLD AUTO: 9.1 % — SIGNIFICANT CHANGE UP (ref 2–14)
NEUTROPHILS # BLD AUTO: 3.1 K/UL — SIGNIFICANT CHANGE UP (ref 1.8–7.4)
NEUTROPHILS NFR BLD AUTO: 58.5 % — SIGNIFICANT CHANGE UP (ref 43–77)
NRBC # BLD: 0 /100 WBCS — SIGNIFICANT CHANGE UP (ref 0–0)
PLATELET # BLD AUTO: 221 K/UL — SIGNIFICANT CHANGE UP (ref 150–400)
RBC # BLD: 3.62 M/UL — LOW (ref 3.8–5.2)
RBC # FLD: 13.3 % — SIGNIFICANT CHANGE UP (ref 10.3–14.5)
WBC # BLD: 5.29 K/UL — SIGNIFICANT CHANGE UP (ref 3.8–10.5)
WBC # FLD AUTO: 5.29 K/UL — SIGNIFICANT CHANGE UP (ref 3.8–10.5)

## 2021-05-12 ENCOUNTER — APPOINTMENT (OUTPATIENT)
Dept: INFUSION THERAPY | Facility: HOSPITAL | Age: 61
End: 2021-05-12

## 2021-05-25 ENCOUNTER — OUTPATIENT (OUTPATIENT)
Dept: OUTPATIENT SERVICES | Facility: HOSPITAL | Age: 61
LOS: 1 days | Discharge: HOME | End: 2021-05-25

## 2021-05-25 DIAGNOSIS — C54.1 MALIGNANT NEOPLASM OF ENDOMETRIUM: ICD-10-CM

## 2021-05-25 DIAGNOSIS — Z98.890 OTHER SPECIFIED POSTPROCEDURAL STATES: Chronic | ICD-10-CM

## 2021-05-25 DIAGNOSIS — Z90.49 ACQUIRED ABSENCE OF OTHER SPECIFIED PARTS OF DIGESTIVE TRACT: Chronic | ICD-10-CM

## 2021-05-25 DIAGNOSIS — Z98.891 HISTORY OF UTERINE SCAR FROM PREVIOUS SURGERY: Chronic | ICD-10-CM

## 2021-05-26 ENCOUNTER — RESULT REVIEW (OUTPATIENT)
Age: 61
End: 2021-05-26

## 2021-05-26 ENCOUNTER — APPOINTMENT (OUTPATIENT)
Dept: HEMATOLOGY ONCOLOGY | Facility: CLINIC | Age: 61
End: 2021-05-26
Payer: COMMERCIAL

## 2021-05-26 VITALS
HEART RATE: 61 BPM | BODY MASS INDEX: 26.51 KG/M2 | SYSTOLIC BLOOD PRESSURE: 117 MMHG | TEMPERATURE: 97.9 F | RESPIRATION RATE: 16 BRPM | DIASTOLIC BLOOD PRESSURE: 73 MMHG | OXYGEN SATURATION: 99 % | WEIGHT: 146.83 LBS

## 2021-05-26 LAB
BASOPHILS # BLD AUTO: 0.02 K/UL — SIGNIFICANT CHANGE UP (ref 0–0.2)
BASOPHILS NFR BLD AUTO: 0.4 % — SIGNIFICANT CHANGE UP (ref 0–2)
EOSINOPHIL # BLD AUTO: 0.04 K/UL — SIGNIFICANT CHANGE UP (ref 0–0.5)
EOSINOPHIL NFR BLD AUTO: 0.8 % — SIGNIFICANT CHANGE UP (ref 0–6)
HCT VFR BLD CALC: 32.1 % — LOW (ref 34.5–45)
HGB BLD-MCNC: 10.5 G/DL — LOW (ref 11.5–15.5)
IMM GRANULOCYTES NFR BLD AUTO: 0.6 % — SIGNIFICANT CHANGE UP (ref 0–1.5)
LYMPHOCYTES # BLD AUTO: 1.48 K/UL — SIGNIFICANT CHANGE UP (ref 1–3.3)
LYMPHOCYTES # BLD AUTO: 29 % — SIGNIFICANT CHANGE UP (ref 13–44)
MCHC RBC-ENTMCNC: 28.9 PG — SIGNIFICANT CHANGE UP (ref 27–34)
MCHC RBC-ENTMCNC: 32.7 G/DL — SIGNIFICANT CHANGE UP (ref 32–36)
MCV RBC AUTO: 88.4 FL — SIGNIFICANT CHANGE UP (ref 80–100)
MONOCYTES # BLD AUTO: 0.44 K/UL — SIGNIFICANT CHANGE UP (ref 0–0.9)
MONOCYTES NFR BLD AUTO: 8.6 % — SIGNIFICANT CHANGE UP (ref 2–14)
NEUTROPHILS # BLD AUTO: 3.1 K/UL — SIGNIFICANT CHANGE UP (ref 1.8–7.4)
NEUTROPHILS NFR BLD AUTO: 60.6 % — SIGNIFICANT CHANGE UP (ref 43–77)
NRBC # BLD: 0 /100 WBCS — SIGNIFICANT CHANGE UP (ref 0–0)
PLATELET # BLD AUTO: 183 K/UL — SIGNIFICANT CHANGE UP (ref 150–400)
RBC # BLD: 3.63 M/UL — LOW (ref 3.8–5.2)
RBC # FLD: 13.1 % — SIGNIFICANT CHANGE UP (ref 10.3–14.5)
WBC # BLD: 5.11 K/UL — SIGNIFICANT CHANGE UP (ref 3.8–10.5)
WBC # FLD AUTO: 5.11 K/UL — SIGNIFICANT CHANGE UP (ref 3.8–10.5)

## 2021-05-26 PROCEDURE — 99213 OFFICE O/P EST LOW 20 MIN: CPT

## 2021-05-28 LAB
ALBUMIN SERPL ELPH-MCNC: 4.9 G/DL
ALP BLD-CCNC: 127 U/L
ALT SERPL-CCNC: 14 U/L
ANION GAP SERPL CALC-SCNC: 13 MMOL/L
AST SERPL-CCNC: 15 U/L
BILIRUB SERPL-MCNC: 0.4 MG/DL
BUN SERPL-MCNC: 17 MG/DL
CALCIUM SERPL-MCNC: 9.6 MG/DL
CANCER AG125 SERPL-ACNC: 7 U/ML
CHLORIDE SERPL-SCNC: 105 MMOL/L
CO2 SERPL-SCNC: 25 MMOL/L
CREAT SERPL-MCNC: 0.84 MG/DL
GLUCOSE SERPL-MCNC: 98 MG/DL
MAGNESIUM SERPL-MCNC: 1.8 MG/DL
POTASSIUM SERPL-SCNC: 4.1 MMOL/L
PROT SERPL-MCNC: 7 G/DL
SODIUM SERPL-SCNC: 142 MMOL/L

## 2021-06-07 ENCOUNTER — APPOINTMENT (OUTPATIENT)
Dept: INFUSION THERAPY | Facility: HOSPITAL | Age: 61
End: 2021-06-07

## 2021-06-07 ENCOUNTER — RESULT REVIEW (OUTPATIENT)
Age: 61
End: 2021-06-07

## 2021-06-07 DIAGNOSIS — R11.2 NAUSEA WITH VOMITING, UNSPECIFIED: ICD-10-CM

## 2021-06-07 DIAGNOSIS — Z51.11 ENCOUNTER FOR ANTINEOPLASTIC CHEMOTHERAPY: ICD-10-CM

## 2021-06-07 LAB
BASOPHILS # BLD AUTO: 0 K/UL — SIGNIFICANT CHANGE UP (ref 0–0.2)
BASOPHILS NFR BLD AUTO: 0 % — SIGNIFICANT CHANGE UP (ref 0–2)
EOSINOPHIL # BLD AUTO: 0.04 K/UL — SIGNIFICANT CHANGE UP (ref 0–0.5)
EOSINOPHIL NFR BLD AUTO: 1 % — SIGNIFICANT CHANGE UP (ref 0–6)
HCT VFR BLD CALC: 31.9 % — LOW (ref 34.5–45)
HGB BLD-MCNC: 10.9 G/DL — LOW (ref 11.5–15.5)
LYMPHOCYTES # BLD AUTO: 1.66 K/UL — SIGNIFICANT CHANGE UP (ref 1–3.3)
LYMPHOCYTES # BLD AUTO: 43 % — SIGNIFICANT CHANGE UP (ref 13–44)
MCHC RBC-ENTMCNC: 29 PG — SIGNIFICANT CHANGE UP (ref 27–34)
MCHC RBC-ENTMCNC: 34.2 G/DL — SIGNIFICANT CHANGE UP (ref 32–36)
MCV RBC AUTO: 84.8 FL — SIGNIFICANT CHANGE UP (ref 80–100)
MONOCYTES # BLD AUTO: 0.5 K/UL — SIGNIFICANT CHANGE UP (ref 0–0.9)
MONOCYTES NFR BLD AUTO: 13 % — SIGNIFICANT CHANGE UP (ref 2–14)
NEUTROPHILS # BLD AUTO: 1.66 K/UL — LOW (ref 1.8–7.4)
NEUTROPHILS NFR BLD AUTO: 43 % — SIGNIFICANT CHANGE UP (ref 43–77)
NRBC # BLD: 0 /100 — SIGNIFICANT CHANGE UP (ref 0–0)
NRBC # BLD: SIGNIFICANT CHANGE UP /100 WBCS (ref 0–0)
PLAT MORPH BLD: NORMAL — SIGNIFICANT CHANGE UP
PLATELET # BLD AUTO: 202 K/UL — SIGNIFICANT CHANGE UP (ref 150–400)
RBC # BLD: 3.76 M/UL — LOW (ref 3.8–5.2)
RBC # FLD: 12.8 % — SIGNIFICANT CHANGE UP (ref 10.3–14.5)
RBC BLD AUTO: SIGNIFICANT CHANGE UP
WBC # BLD: 3.87 K/UL — SIGNIFICANT CHANGE UP (ref 3.8–10.5)
WBC # FLD AUTO: 3.87 K/UL — SIGNIFICANT CHANGE UP (ref 3.8–10.5)

## 2021-06-08 ENCOUNTER — APPOINTMENT (OUTPATIENT)
Dept: INFUSION THERAPY | Facility: HOSPITAL | Age: 61
End: 2021-06-08

## 2021-06-08 DIAGNOSIS — Z51.89 ENCOUNTER FOR OTHER SPECIFIED AFTERCARE: ICD-10-CM

## 2021-06-17 ENCOUNTER — RESULT REVIEW (OUTPATIENT)
Age: 61
End: 2021-06-17

## 2021-06-17 ENCOUNTER — APPOINTMENT (OUTPATIENT)
Dept: HEMATOLOGY ONCOLOGY | Facility: CLINIC | Age: 61
End: 2021-06-17
Payer: COMMERCIAL

## 2021-06-17 VITALS
WEIGHT: 146.83 LBS | BODY MASS INDEX: 26.68 KG/M2 | RESPIRATION RATE: 16 BRPM | HEIGHT: 62.4 IN | TEMPERATURE: 97.5 F | SYSTOLIC BLOOD PRESSURE: 151 MMHG | OXYGEN SATURATION: 100 % | DIASTOLIC BLOOD PRESSURE: 79 MMHG | HEART RATE: 77 BPM

## 2021-06-17 LAB
BASOPHILS # BLD AUTO: 0 K/UL — SIGNIFICANT CHANGE UP (ref 0–0.2)
BASOPHILS NFR BLD AUTO: 0 % — SIGNIFICANT CHANGE UP (ref 0–2)
EOSINOPHIL # BLD AUTO: 0 K/UL — SIGNIFICANT CHANGE UP (ref 0–0.5)
EOSINOPHIL NFR BLD AUTO: 0 % — SIGNIFICANT CHANGE UP (ref 0–6)
HCT VFR BLD CALC: 30 % — LOW (ref 34.5–45)
HGB BLD-MCNC: 9.8 G/DL — LOW (ref 11.5–15.5)
LYMPHOCYTES # BLD AUTO: 2.83 K/UL — SIGNIFICANT CHANGE UP (ref 1–3.3)
LYMPHOCYTES # BLD AUTO: 23 % — SIGNIFICANT CHANGE UP (ref 13–44)
MCHC RBC-ENTMCNC: 28.5 PG — SIGNIFICANT CHANGE UP (ref 27–34)
MCHC RBC-ENTMCNC: 32.7 G/DL — SIGNIFICANT CHANGE UP (ref 32–36)
MCV RBC AUTO: 87.2 FL — SIGNIFICANT CHANGE UP (ref 80–100)
MONOCYTES # BLD AUTO: 0.49 K/UL — SIGNIFICANT CHANGE UP (ref 0–0.9)
MONOCYTES NFR BLD AUTO: 4 % — SIGNIFICANT CHANGE UP (ref 2–14)
NEUTROPHILS # BLD AUTO: 8.99 K/UL — HIGH (ref 1.8–7.4)
NEUTROPHILS NFR BLD AUTO: 73 % — SIGNIFICANT CHANGE UP (ref 43–77)
NRBC # BLD: 0 /100 — SIGNIFICANT CHANGE UP (ref 0–0)
NRBC # BLD: SIGNIFICANT CHANGE UP /100 WBCS (ref 0–0)
PLAT MORPH BLD: NORMAL — SIGNIFICANT CHANGE UP
PLATELET # BLD AUTO: 135 K/UL — LOW (ref 150–400)
RBC # BLD: 3.44 M/UL — LOW (ref 3.8–5.2)
RBC # FLD: 12.5 % — SIGNIFICANT CHANGE UP (ref 10.3–14.5)
RBC BLD AUTO: SIGNIFICANT CHANGE UP
WBC # BLD: 12.32 K/UL — HIGH (ref 3.8–10.5)
WBC # FLD AUTO: 12.32 K/UL — HIGH (ref 3.8–10.5)

## 2021-06-17 PROCEDURE — 99213 OFFICE O/P EST LOW 20 MIN: CPT

## 2021-06-18 LAB
ALBUMIN SERPL ELPH-MCNC: 4.6 G/DL
ALP BLD-CCNC: 147 U/L
ALT SERPL-CCNC: 18 U/L
ANION GAP SERPL CALC-SCNC: 13 MMOL/L
AST SERPL-CCNC: 20 U/L
BILIRUB SERPL-MCNC: 0.2 MG/DL
BUN SERPL-MCNC: 13 MG/DL
CALCIUM SERPL-MCNC: 9.6 MG/DL
CANCER AG125 SERPL-ACNC: 8 U/ML
CHLORIDE SERPL-SCNC: 107 MMOL/L
CO2 SERPL-SCNC: 27 MMOL/L
CREAT SERPL-MCNC: 0.87 MG/DL
GLUCOSE SERPL-MCNC: 106 MG/DL
MAGNESIUM SERPL-MCNC: 1.5 MG/DL
POTASSIUM SERPL-SCNC: 4 MMOL/L
PROT SERPL-MCNC: 6.6 G/DL
SODIUM SERPL-SCNC: 146 MMOL/L

## 2021-07-01 ENCOUNTER — OUTPATIENT (OUTPATIENT)
Dept: OUTPATIENT SERVICES | Facility: HOSPITAL | Age: 61
LOS: 1 days | Discharge: HOME | End: 2021-07-01

## 2021-07-01 DIAGNOSIS — Z98.890 OTHER SPECIFIED POSTPROCEDURAL STATES: Chronic | ICD-10-CM

## 2021-07-01 DIAGNOSIS — Z90.49 ACQUIRED ABSENCE OF OTHER SPECIFIED PARTS OF DIGESTIVE TRACT: Chronic | ICD-10-CM

## 2021-07-01 DIAGNOSIS — Z98.891 HISTORY OF UTERINE SCAR FROM PREVIOUS SURGERY: Chronic | ICD-10-CM

## 2021-07-01 DIAGNOSIS — C54.1 MALIGNANT NEOPLASM OF ENDOMETRIUM: ICD-10-CM

## 2021-07-12 ENCOUNTER — RESULT REVIEW (OUTPATIENT)
Age: 61
End: 2021-07-12

## 2021-07-12 ENCOUNTER — APPOINTMENT (OUTPATIENT)
Dept: INFUSION THERAPY | Facility: HOSPITAL | Age: 61
End: 2021-07-12

## 2021-07-12 DIAGNOSIS — Z51.11 ENCOUNTER FOR ANTINEOPLASTIC CHEMOTHERAPY: ICD-10-CM

## 2021-07-12 DIAGNOSIS — R11.2 NAUSEA WITH VOMITING, UNSPECIFIED: ICD-10-CM

## 2021-07-12 LAB
BASOPHILS # BLD AUTO: 0.04 K/UL — SIGNIFICANT CHANGE UP (ref 0–0.2)
BASOPHILS NFR BLD AUTO: 0.6 % — SIGNIFICANT CHANGE UP (ref 0–2)
EOSINOPHIL # BLD AUTO: 0.05 K/UL — SIGNIFICANT CHANGE UP (ref 0–0.5)
EOSINOPHIL NFR BLD AUTO: 0.7 % — SIGNIFICANT CHANGE UP (ref 0–6)
HCT VFR BLD CALC: 32.4 % — LOW (ref 34.5–45)
HGB BLD-MCNC: 11.3 G/DL — LOW (ref 11.5–15.5)
IMM GRANULOCYTES NFR BLD AUTO: 1.2 % — SIGNIFICANT CHANGE UP (ref 0–1.5)
LYMPHOCYTES # BLD AUTO: 2.12 K/UL — SIGNIFICANT CHANGE UP (ref 1–3.3)
LYMPHOCYTES # BLD AUTO: 31.7 % — SIGNIFICANT CHANGE UP (ref 13–44)
MCHC RBC-ENTMCNC: 29 PG — SIGNIFICANT CHANGE UP (ref 27–34)
MCHC RBC-ENTMCNC: 34.9 G/DL — SIGNIFICANT CHANGE UP (ref 32–36)
MCV RBC AUTO: 83.1 FL — SIGNIFICANT CHANGE UP (ref 80–100)
MONOCYTES # BLD AUTO: 0.45 K/UL — SIGNIFICANT CHANGE UP (ref 0–0.9)
MONOCYTES NFR BLD AUTO: 6.7 % — SIGNIFICANT CHANGE UP (ref 2–14)
NEUTROPHILS # BLD AUTO: 3.94 K/UL — SIGNIFICANT CHANGE UP (ref 1.8–7.4)
NEUTROPHILS NFR BLD AUTO: 59.1 % — SIGNIFICANT CHANGE UP (ref 43–77)
NRBC # BLD: 0 /100 WBCS — SIGNIFICANT CHANGE UP (ref 0–0)
PLATELET # BLD AUTO: 184 K/UL — SIGNIFICANT CHANGE UP (ref 150–400)
RBC # BLD: 3.9 M/UL — SIGNIFICANT CHANGE UP (ref 3.8–5.2)
RBC # FLD: 13.1 % — SIGNIFICANT CHANGE UP (ref 10.3–14.5)
WBC # BLD: 6.68 K/UL — SIGNIFICANT CHANGE UP (ref 3.8–10.5)
WBC # FLD AUTO: 6.68 K/UL — SIGNIFICANT CHANGE UP (ref 3.8–10.5)

## 2021-07-13 ENCOUNTER — APPOINTMENT (OUTPATIENT)
Dept: INFUSION THERAPY | Facility: HOSPITAL | Age: 61
End: 2021-07-13

## 2021-07-13 DIAGNOSIS — Z51.89 ENCOUNTER FOR OTHER SPECIFIED AFTERCARE: ICD-10-CM

## 2021-07-15 NOTE — ASSESSMENT
[FreeTextEntry1] : Ms. Manriquez is a 59 y/o postmenopausal F with history of stage IB endometrioid endometrial adenocarcinoma, grade 2 +LVSI, 17/20mm muscle invasion s/p RTLH/BSO, SLN, PPALND in 7/2018 followed by adjuvant vaginal brachytherapy completed in 11/2018, who was PHUONG until February 2020 when she was found to have recurrent metastatic disease, now biopsy-proven from new abdominal wall mass.\par \par She remains clinically stable with acceptable toxicities from chemotherapy. 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 Symptoms improved with reduction of chemotherapy doses and initiation of medical cannibis, which she is intermittently using.\par \par We reviewed the most recent CT scans from 4/23/21 that demonstrated continued interval decrease in size of peritoneal implants, pelvic lymphadenopathy, and a left rectus intramuscular mass as described above since 1/29/2021.\par \par Plan:\par -Interval bloodwork and imaging reviewed\par -continue carboplatin/abraxane as continued response noted on imaging \par -f/u palliative care for management of pain and neuropathy, symptoms improving with medical cannibis\par -f/u gyn onc Dr. Fuentes \par -RTC 3 weeks \par \par The patient is in agreement with the plan of care. All of her questions and concerns were addressed. \par \par

## 2021-07-15 NOTE — HISTORY OF PRESENT ILLNESS
[Home] : at home, [unfilled] , at the time of the visit. [Medical Office: ([facility])___] : at the medical office located in  [Verbal consent obtained from patient] : the patient, [unfilled] [de-identified] : Referred by Dr. Fuentes\par Telephone visit:\par Due to the COVID19 pandemic, this interview was conducted over the telephone, with the patient and her . I was not able to do a physical exam during this interview. 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 She is being referred to medical oncology for treatment considerations for recurrent metastatic disease.\par \par Ms. Manriquez initially presented to her GYN (Dr. Powell, 5/17/18) for postmenopausal bleeding that began shortly after Lap cholecystectomy (4/9/18). She underwent a Pelvic US that showed 10.8 x 6.7 x 6.1 cm uterus with fibroids. She underwent endometrial biopsy with Dr. English at [place] on 5/21/18. Pathology was significant for endometrial adenocarcinoma, endometroid type, FIGO grade 1 nuclear grade 2 with foci of squamous differentiation. \par \par MRI of the pelvis on 5/24/18 showed a large mass involving the lower uterine segment and body. There were no pathologically enlarged lymph nodes but there was a large heterogeneous lesion involving the left sacrum suspicious for osseous metastatic disease. She underwent biopsy of the left sacral lesion which was read as consistent with hemangioma to rule out metastatic disease.\par \par She subsequently underwent comprehensive surgical staging by Dr. Fuentes including RTLH, BSO, SLN, PPALND on 7/19/18. Pathology for significant for FIGO grade 1 nuclear grade 2 invasive adenocarcinoma with LVSI and 17/20 mm muscle invasion, stage IB. There was no cervical stromal invasion, lower uterine segment invasion, or uterine serosal involvement. All the sampled left pelvic, right pelvic and para-aortic nodes were free of cancer. \par \par Postoperatively she did well and received vaginal brachytherapy 2500 cGy in 5 fractions over 10 days completed 9/17/18 with Dr. Littlejohn of radiation oncology. \par \par She felt well without any concerning symptoms and was PHUONG under surveillance until she reported new left sided abdominal pain and a firmness. In retrospect, she thinks that she felt something different in her L abdomen/pelvis for the past 1-2 months. She followed up with Dr. Fuentes who palpated a 3cm left sided mass under robotic trochar incision suspicious for recurrence. \par \par CT CAP 2/15/19 demonstrated:\par New pulmonary nodule (two subcm nodules, new), external iliac lymphadenopathy, soft tissue intraperitoneal implants, and intramuscular enhancing nodules consistent with metastatic disease. \par \par PERITONEUM: Trace amount of pelvic fluid. There multiple new soft tissue masses, for instance \par adjacent to the descending colon along the left paracolic gutter measuring 4.6 x 3.1 x 2.2 cm and \par within the right lower quadrant measuring 3.6 x 3.4 x 1.8 cm. \par VESSELS: Within normal limits. \par RETROPERITONEUM/LYMPH NODES: Bilateral new enlarged external iliac lymph nodes (2:133) \par measuring up to 2.2 x 1.7 cm on the left \par ABDOMINAL WALL: Within the left rectus muscle there is an enhancing mass measuring 5.0 x 2.8 x \par 5.7 cm, with an additional nodule within the anterior subcutaneous tissues. There is an additional 2 \par cm mass abutting the inferior left rectus muscle. \par BONES: Within normal limits. \par \par She underwent US-guided abdominal wall mass biopsy on 3/6 by IR. Pathology review from the biopsy demonstrated metastatic adenocarcinoma, c/w metastasis from patient's known history of endometroid endometrial adenocarcinoma. \par \par Addendum\par Addendum issued to report results of additional immunostains\par P53:Wild type; Ki-67 is mildly elevated, WT1 is negative\par The cytomorphology in conjunction with the immunophenotype is consistent with metastases from a primary endometrioid endometrial adenocarcinoma.\par \par She has been referred to medical oncology to discuss systemic treatment for her recurrent endometrial cancer. She feels very well overall, although she continues to feel the intermittent L sided abdominal discomfort and pulling sensation. She is working full time and lives with her . She has a daughter. She has a good appetite and denies any weight changes.  [de-identified] : The patient is here for follow up, currently on chemotherapy with carbo/abraxane. \par Since her last visit she continues to feel well overall with good appetite and energy. No significant changes in her symptoms. She has good energy and continues to stay active and is working from home. Took Mg supplementation for low Mg without issues. Had f/u CT scans.\par Stage grade 1 fatigue and neuropathy of the hands and feet. Using gabapentin and medical marijuana occasionally with good effect. \par She denies fevers, chills, nausea, vomiting, abdominal pain, urinary symptoms, vaginal bleeding/discharge, CP or SOB. \par  \par \par \par

## 2021-07-15 NOTE — REVIEW OF SYSTEMS
[Negative] : Musculoskeletal [Neuropathy] : neuropathy [FreeTextEntry2] : grade 1 neuropathy of fingers and toes b/l, improved after this cycle  [de-identified] : mild constipation, occasional R sided/umbilical abdominal discomfort

## 2021-07-15 NOTE — RESULTS/DATA
[FreeTextEntry1] : CT A/P 4/23/21:\par \par FINDINGS:\par LOWER CHEST: Trace pericardial effusion.\par \par LIVER: Diffuse hepatic steatosis.\par BILE DUCTS: Normal caliber.\par GALLBLADDER: Status post cholecystectomy.\par SPLEEN: Unchanged 1.5 cm hyperdense lesion in the anterior aspect of the spleen.\par PANCREAS: Within normal limits.\par ADRENALS: Within normal limits.\par KIDNEYS/URETERS: No hydronephrosis. Subcentimeter hypodense foci in the kidneys bilaterally, too small to characterize.\par \par BLADDER: Within normal limits.\par REPRODUCTIVE ORGANS: Status post hysterectomy.\par \par BOWEL: No bowel obstruction. Small periampullary duodenal diverticulum.\par PERITONEUM: No ascites. Peritoneal implants have slightly decreased in size. Reference implants are as follows:\par Left lower quadrant (2:84) measures 1.2 x 1.2 cm, unchanged.\par Inferior left lower quadrant pelvic sidewall implant (2:88) measures 1.6 x 1.2 cm, previously 2 x 1.8 cm.\par Right lower quadrant implant (2:89) measures 1.7 x 0.9 cm, previously 2 x 1.4 cm.\par VESSELS: Within normal limits.\par RETROPERITONEUM/LYMPH NODES: Pelvic lymphadenopathy, with reference lymph node as follows:\par Left internal iliac (2:93) measures 1.4 x 1.2 cm, previously 1.7 x 1.2 cm..\par ABDOMINAL WALL: Left rectus intramuscular mass (2:67) measures 1.9 cm, previously 3.2 cm.\par BONES: Degenerative changes.  Lytic lesion in the left hemisacrum, unchanged.\par \par IMPRESSION:\par Interval decrease in size of peritoneal implants, pelvic lymphadenopathy, and a left rectus intramuscular mass as described above since 1/29/2021.\par \par

## 2021-07-15 NOTE — PHYSICAL EXAM
[Restricted in physically strenuous activity but ambulatory and able to carry out work of a light or sedentary nature] : Status 1- Restricted in physically strenuous activity but ambulatory and able to carry out work of a light or sedentary nature, e.g., light house work, office work [Normal] : affect appropriate [de-identified] : +alopecia  [de-identified] : Normal respiratory effort and rate

## 2021-07-26 ENCOUNTER — RESULT REVIEW (OUTPATIENT)
Age: 61
End: 2021-07-26

## 2021-07-26 ENCOUNTER — APPOINTMENT (OUTPATIENT)
Dept: HEMATOLOGY ONCOLOGY | Facility: CLINIC | Age: 61
End: 2021-07-26
Payer: COMMERCIAL

## 2021-07-26 VITALS
OXYGEN SATURATION: 97 % | TEMPERATURE: 97.8 F | RESPIRATION RATE: 16 BRPM | DIASTOLIC BLOOD PRESSURE: 87 MMHG | HEART RATE: 88 BPM | SYSTOLIC BLOOD PRESSURE: 147 MMHG | HEIGHT: 63 IN | BODY MASS INDEX: 26.17 KG/M2 | WEIGHT: 147.71 LBS

## 2021-07-26 LAB
BASOPHILS # BLD AUTO: 0.03 K/UL — SIGNIFICANT CHANGE UP (ref 0–0.2)
BASOPHILS NFR BLD AUTO: 0.4 % — SIGNIFICANT CHANGE UP (ref 0–2)
EOSINOPHIL # BLD AUTO: 0.05 K/UL — SIGNIFICANT CHANGE UP (ref 0–0.5)
EOSINOPHIL NFR BLD AUTO: 0.7 % — SIGNIFICANT CHANGE UP (ref 0–6)
HCT VFR BLD CALC: 32 % — LOW (ref 34.5–45)
HGB BLD-MCNC: 10.6 G/DL — LOW (ref 11.5–15.5)
IMM GRANULOCYTES NFR BLD AUTO: 0.9 % — SIGNIFICANT CHANGE UP (ref 0–1.5)
LYMPHOCYTES # BLD AUTO: 1.9 K/UL — SIGNIFICANT CHANGE UP (ref 1–3.3)
LYMPHOCYTES # BLD AUTO: 27.9 % — SIGNIFICANT CHANGE UP (ref 13–44)
MCHC RBC-ENTMCNC: 28.6 PG — SIGNIFICANT CHANGE UP (ref 27–34)
MCHC RBC-ENTMCNC: 33.1 G/DL — SIGNIFICANT CHANGE UP (ref 32–36)
MCV RBC AUTO: 86.3 FL — SIGNIFICANT CHANGE UP (ref 80–100)
MONOCYTES # BLD AUTO: 0.56 K/UL — SIGNIFICANT CHANGE UP (ref 0–0.9)
MONOCYTES NFR BLD AUTO: 8.2 % — SIGNIFICANT CHANGE UP (ref 2–14)
NEUTROPHILS # BLD AUTO: 4.22 K/UL — SIGNIFICANT CHANGE UP (ref 1.8–7.4)
NEUTROPHILS NFR BLD AUTO: 61.9 % — SIGNIFICANT CHANGE UP (ref 43–77)
NRBC # BLD: 0 /100 WBCS — SIGNIFICANT CHANGE UP (ref 0–0)
PLATELET # BLD AUTO: 168 K/UL — SIGNIFICANT CHANGE UP (ref 150–400)
RBC # BLD: 3.71 M/UL — LOW (ref 3.8–5.2)
RBC # FLD: 13.4 % — SIGNIFICANT CHANGE UP (ref 10.3–14.5)
WBC # BLD: 6.82 K/UL — SIGNIFICANT CHANGE UP (ref 3.8–10.5)
WBC # FLD AUTO: 6.82 K/UL — SIGNIFICANT CHANGE UP (ref 3.8–10.5)

## 2021-07-26 PROCEDURE — 99214 OFFICE O/P EST MOD 30 MIN: CPT

## 2021-07-26 NOTE — RESULTS/DATA
[FreeTextEntry1] : CT CAP 4/23/21:\par \par FINDINGS:\par LOWER CHEST: Trace pericardial effusion.\par \par LIVER: Diffuse hepatic steatosis.\par BILE DUCTS: Normal caliber.\par GALLBLADDER: Status post cholecystectomy.\par SPLEEN: Unchanged 1.5 cm hyperdense lesion in the anterior aspect of the spleen.\par PANCREAS: Within normal limits.\par ADRENALS: Within normal limits.\par KIDNEYS/URETERS: No hydronephrosis. Subcentimeter hypodense foci in the kidneys bilaterally, too small to characterize.\par \par BLADDER: Within normal limits.\par REPRODUCTIVE ORGANS: Status post hysterectomy.\par \par BOWEL: No bowel obstruction. Small periampullary duodenal diverticulum.\par PERITONEUM: No ascites. Peritoneal implants have slightly decreased in size. Reference implants are as follows:\par Left lower quadrant (2:84) measures 1.2 x 1.2 cm, unchanged.\par Inferior left lower quadrant pelvic sidewall implant (2:88) measures 1.6 x 1.2 cm, previously 2 x 1.8 cm.\par Right lower quadrant implant (2:89) measures 1.7 x 0.9 cm, previously 2 x 1.4 cm.\par VESSELS: Within normal limits.\par RETROPERITONEUM/LYMPH NODES: Pelvic lymphadenopathy, with reference lymph node as follows:\par Left internal iliac (2:93) measures 1.4 x 1.2 cm, previously 1.7 x 1.2 cm..\par ABDOMINAL WALL: Left rectus intramuscular mass (2:67) measures 1.9 cm, previously 3.2 cm.\par BONES: Degenerative changes.  Lytic lesion in the left hemisacrum, unchanged.\par \par IMPRESSION:\par Interval decrease in size of peritoneal implants, pelvic lymphadenopathy, and a left rectus intramuscular mass as described above since 1/29/2021.

## 2021-07-26 NOTE — REVIEW OF SYSTEMS
[Negative] : Musculoskeletal [Neuropathy] : neuropathy [FreeTextEntry2] : grade 1 neuropathy of fingers and toes b/l, improved after this cycle  [de-identified] : mild constipation, occasional R sided/umbilical abdominal discomfort

## 2021-07-26 NOTE — HISTORY OF PRESENT ILLNESS
[de-identified] : Referred by Dr. Fuentes\par Telephone visit:\par Due to the COVID19 pandemic, this interview was conducted over the telephone, with the patient and her . I was not able to do a physical exam during this interview. 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 She is being referred to medical oncology for treatment considerations for recurrent metastatic disease.\par \par Ms. Manriquez initially presented to her GYN (Dr. Powell, 5/17/18) for postmenopausal bleeding that began shortly after Lap cholecystectomy (4/9/18). She underwent a Pelvic US that showed 10.8 x 6.7 x 6.1 cm uterus with fibroids. She underwent endometrial biopsy with Dr. English at [place] on 5/21/18. Pathology was significant for endometrial adenocarcinoma, endometroid type, FIGO grade 1 nuclear grade 2 with foci of squamous differentiation. \par \par MRI of the pelvis on 5/24/18 showed a large mass involving the lower uterine segment and body. There were no pathologically enlarged lymph nodes but there was a large heterogeneous lesion involving the left sacrum suspicious for osseous metastatic disease. She underwent biopsy of the left sacral lesion which was read as consistent with hemangioma to rule out metastatic disease.\par \par She subsequently underwent comprehensive surgical staging by Dr. Fuentes including RTLH, BSO, SLN, PPALND on 7/19/18. Pathology for significant for FIGO grade 1 nuclear grade 2 invasive adenocarcinoma with LVSI and 17/20 mm muscle invasion, stage IB. There was no cervical stromal invasion, lower uterine segment invasion, or uterine serosal involvement. All the sampled left pelvic, right pelvic and para-aortic nodes were free of cancer. \par \par Postoperatively she did well and received vaginal brachytherapy 2500 cGy in 5 fractions over 10 days completed 9/17/18 with Dr. Littlejohn of radiation oncology. \par \par She felt well without any concerning symptoms and was PHUONG under surveillance until she reported new left sided abdominal pain and a firmness. In retrospect, she thinks that she felt something different in her L abdomen/pelvis for the past 1-2 months. She followed up with Dr. Fuentes who palpated a 3cm left sided mass under robotic trochar incision suspicious for recurrence. \par \par CT CAP 2/15/19 demonstrated:\par New pulmonary nodule (two subcm nodules, new), external iliac lymphadenopathy, soft tissue intraperitoneal implants, and intramuscular enhancing nodules consistent with metastatic disease. \par \par PERITONEUM: Trace amount of pelvic fluid. There multiple new soft tissue masses, for instance \par adjacent to the descending colon along the left paracolic gutter measuring 4.6 x 3.1 x 2.2 cm and \par within the right lower quadrant measuring 3.6 x 3.4 x 1.8 cm. \par VESSELS: Within normal limits. \par RETROPERITONEUM/LYMPH NODES: Bilateral new enlarged external iliac lymph nodes (2:133) \par measuring up to 2.2 x 1.7 cm on the left \par ABDOMINAL WALL: Within the left rectus muscle there is an enhancing mass measuring 5.0 x 2.8 x \par 5.7 cm, with an additional nodule within the anterior subcutaneous tissues. There is an additional 2 \par cm mass abutting the inferior left rectus muscle. \par BONES: Within normal limits. \par \par She underwent US-guided abdominal wall mass biopsy on 3/6 by IR. Pathology review from the biopsy demonstrated metastatic adenocarcinoma, c/w metastasis from patient's known history of endometroid endometrial adenocarcinoma. \par \par Addendum\par Addendum issued to report results of additional immunostains\par P53:Wild type; Ki-67 is mildly elevated, WT1 is negative\par The cytomorphology in conjunction with the immunophenotype is consistent with metastases from a primary endometrioid endometrial adenocarcinoma.\par \par She has been referred to medical oncology to discuss systemic treatment for her recurrent endometrial cancer. She feels very well overall, although she continues to feel the intermittent L sided abdominal discomfort and pulling sensation. She is working full time and lives with her . She has a daughter. She has a good appetite and denies any weight changes.  [de-identified] : The patient is here for follow up, currently on chemotherapy with carbo/abraxane. \par Since her last visit she continues to feel well overall with good appetite and energy. \par No significant changes in her symptoms. She has good energy and continues to stay active and is working from home. \par Stage grade 1 neuropathy of the hands and feet. Using gabapentin and medical marijuana occasionally with good effect. \par Put on weight. Has been feeling stressed due to her teenage daughter, lwho eft home and now back home. \par \par She denies fevers, chills, nausea, vomiting, abdominal pain, urinary symptoms, vaginal bleeding/discharge, CP or SOB. \par  \par \par \par

## 2021-07-26 NOTE — ASSESSMENT
[FreeTextEntry1] : Ms. Manriquez is a 61 y/o postmenopausal F with history of stage IB endometrioid endometrial adenocarcinoma, grade 2 +LVSI, 17/20mm muscle invasion s/p RTLH/BSO, SLN, PPALND in 7/2018 followed by adjuvant vaginal brachytherapy completed in 11/2018, who was PHUONG until February 2020 when she was found to have recurrent metastatic disease, now biopsy-proven from new abdominal wall mass.\par \par Her most recent CT scans from 4/23/21 showed continued response to current chemotherapy regimen with interval decrease in peritoneal, retroperitoneal and abdominal wall disease.\par \par She remains clinically stable with acceptable toxicities from chemotherapy. 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 Fuentes; also sees Dr. Foster at [facility]\par -RTC 3 weeks \par \par The patient is in agreement with the plan of care. All of her questions and concerns were addressed. \par \par

## 2021-07-26 NOTE — PHYSICAL EXAM
[Restricted in physically strenuous activity but ambulatory and able to carry out work of a light or sedentary nature] : Status 1- Restricted in physically strenuous activity but ambulatory and able to carry out work of a light or sedentary nature, e.g., light house work, office work [Normal] : affect appropriate [de-identified] : +alopecia  [de-identified] : Normal respiratory effort and rate

## 2021-07-27 LAB
ALBUMIN SERPL ELPH-MCNC: 4.8 G/DL
ALP BLD-CCNC: 168 U/L
ALT SERPL-CCNC: 21 U/L
ANION GAP SERPL CALC-SCNC: 15 MMOL/L
AST SERPL-CCNC: 19 U/L
BILIRUB SERPL-MCNC: 0.4 MG/DL
BUN SERPL-MCNC: 18 MG/DL
CALCIUM SERPL-MCNC: 9.4 MG/DL
CANCER AG125 SERPL-ACNC: 7 U/ML
CHLORIDE SERPL-SCNC: 106 MMOL/L
CO2 SERPL-SCNC: 22 MMOL/L
CREAT SERPL-MCNC: 0.91 MG/DL
GLUCOSE SERPL-MCNC: 98 MG/DL
MAGNESIUM SERPL-MCNC: 1.7 MG/DL
POTASSIUM SERPL-SCNC: 4.3 MMOL/L
PROT SERPL-MCNC: 7.1 G/DL
SODIUM SERPL-SCNC: 143 MMOL/L

## 2021-07-30 ENCOUNTER — OUTPATIENT (OUTPATIENT)
Dept: OUTPATIENT SERVICES | Facility: HOSPITAL | Age: 61
LOS: 1 days | Discharge: HOME | End: 2021-07-30

## 2021-07-30 DIAGNOSIS — Z98.890 OTHER SPECIFIED POSTPROCEDURAL STATES: Chronic | ICD-10-CM

## 2021-07-30 DIAGNOSIS — Z90.49 ACQUIRED ABSENCE OF OTHER SPECIFIED PARTS OF DIGESTIVE TRACT: Chronic | ICD-10-CM

## 2021-07-30 DIAGNOSIS — Z98.891 HISTORY OF UTERINE SCAR FROM PREVIOUS SURGERY: Chronic | ICD-10-CM

## 2021-07-30 DIAGNOSIS — C54.1 MALIGNANT NEOPLASM OF ENDOMETRIUM: ICD-10-CM

## 2021-08-02 ENCOUNTER — RESULT REVIEW (OUTPATIENT)
Age: 61
End: 2021-08-02

## 2021-08-02 ENCOUNTER — APPOINTMENT (OUTPATIENT)
Dept: INFUSION THERAPY | Facility: HOSPITAL | Age: 61
End: 2021-08-02

## 2021-08-02 LAB
BASOPHILS # BLD AUTO: 0 K/UL — SIGNIFICANT CHANGE UP (ref 0–0.2)
BASOPHILS NFR BLD AUTO: 0 % — SIGNIFICANT CHANGE UP (ref 0–2)
EOSINOPHIL # BLD AUTO: 0.04 K/UL — SIGNIFICANT CHANGE UP (ref 0–0.5)
EOSINOPHIL NFR BLD AUTO: 1 % — SIGNIFICANT CHANGE UP (ref 0–6)
HCT VFR BLD CALC: 31.2 % — LOW (ref 34.5–45)
HGB BLD-MCNC: 10.6 G/DL — LOW (ref 11.5–15.5)
LYMPHOCYTES # BLD AUTO: 1.76 K/UL — SIGNIFICANT CHANGE UP (ref 1–3.3)
LYMPHOCYTES # BLD AUTO: 42 % — SIGNIFICANT CHANGE UP (ref 13–44)
MCHC RBC-ENTMCNC: 28.6 PG — SIGNIFICANT CHANGE UP (ref 27–34)
MCHC RBC-ENTMCNC: 34 G/DL — SIGNIFICANT CHANGE UP (ref 32–36)
MCV RBC AUTO: 84.1 FL — SIGNIFICANT CHANGE UP (ref 80–100)
MONOCYTES # BLD AUTO: 0.38 K/UL — SIGNIFICANT CHANGE UP (ref 0–0.9)
MONOCYTES NFR BLD AUTO: 9 % — SIGNIFICANT CHANGE UP (ref 2–14)
NEUTROPHILS # BLD AUTO: 2.02 K/UL — SIGNIFICANT CHANGE UP (ref 1.8–7.4)
NEUTROPHILS NFR BLD AUTO: 48 % — SIGNIFICANT CHANGE UP (ref 43–77)
NRBC # BLD: 0 /100 — SIGNIFICANT CHANGE UP (ref 0–0)
NRBC # BLD: SIGNIFICANT CHANGE UP /100 WBCS (ref 0–0)
PLAT MORPH BLD: NORMAL — SIGNIFICANT CHANGE UP
PLATELET # BLD AUTO: 235 K/UL — SIGNIFICANT CHANGE UP (ref 150–400)
RBC # BLD: 3.71 M/UL — LOW (ref 3.8–5.2)
RBC # FLD: 13.9 % — SIGNIFICANT CHANGE UP (ref 10.3–14.5)
RBC BLD AUTO: SIGNIFICANT CHANGE UP
WBC # BLD: 4.2 K/UL — SIGNIFICANT CHANGE UP (ref 3.8–10.5)
WBC # FLD AUTO: 4.2 K/UL — SIGNIFICANT CHANGE UP (ref 3.8–10.5)

## 2021-08-03 ENCOUNTER — APPOINTMENT (OUTPATIENT)
Dept: INFUSION THERAPY | Facility: HOSPITAL | Age: 61
End: 2021-08-03

## 2021-08-03 DIAGNOSIS — R11.2 NAUSEA WITH VOMITING, UNSPECIFIED: ICD-10-CM

## 2021-08-03 DIAGNOSIS — Z51.11 ENCOUNTER FOR ANTINEOPLASTIC CHEMOTHERAPY: ICD-10-CM

## 2021-08-04 DIAGNOSIS — Z51.89 ENCOUNTER FOR OTHER SPECIFIED AFTERCARE: ICD-10-CM

## 2021-08-09 ENCOUNTER — OUTPATIENT (OUTPATIENT)
Dept: OUTPATIENT SERVICES | Facility: HOSPITAL | Age: 61
LOS: 1 days | End: 2021-08-09
Payer: COMMERCIAL

## 2021-08-09 ENCOUNTER — RESULT REVIEW (OUTPATIENT)
Age: 61
End: 2021-08-09

## 2021-08-09 ENCOUNTER — APPOINTMENT (OUTPATIENT)
Dept: MAMMOGRAPHY | Facility: IMAGING CENTER | Age: 61
End: 2021-08-09
Payer: COMMERCIAL

## 2021-08-09 DIAGNOSIS — Z98.891 HISTORY OF UTERINE SCAR FROM PREVIOUS SURGERY: Chronic | ICD-10-CM

## 2021-08-09 DIAGNOSIS — Z98.890 OTHER SPECIFIED POSTPROCEDURAL STATES: Chronic | ICD-10-CM

## 2021-08-09 DIAGNOSIS — Z90.49 ACQUIRED ABSENCE OF OTHER SPECIFIED PARTS OF DIGESTIVE TRACT: Chronic | ICD-10-CM

## 2021-08-09 PROCEDURE — 77066 DX MAMMO INCL CAD BI: CPT

## 2021-08-09 PROCEDURE — 77067 SCR MAMMO BI INCL CAD: CPT

## 2021-08-09 PROCEDURE — 77063 BREAST TOMOSYNTHESIS BI: CPT | Mod: 26

## 2021-08-09 PROCEDURE — G0279: CPT

## 2021-08-09 PROCEDURE — 77063 BREAST TOMOSYNTHESIS BI: CPT

## 2021-08-09 PROCEDURE — 77067 SCR MAMMO BI INCL CAD: CPT | Mod: 26

## 2021-08-11 RX ORDER — GABAPENTIN 100 MG/1
100 CAPSULE ORAL
Qty: 60 | Refills: 6 | Status: ACTIVE | COMMUNITY
Start: 2021-01-25 | End: 1900-01-01

## 2021-08-17 NOTE — PHYSICAL EXAM
[Restricted in physically strenuous activity but ambulatory and able to carry out work of a light or sedentary nature] : Status 1- Restricted in physically strenuous activity but ambulatory and able to carry out work of a light or sedentary nature, e.g., light house work, office work [Normal] : affect appropriate [de-identified] : +alopecia  [de-identified] : Normal respiratory effort and rate

## 2021-08-17 NOTE — HISTORY OF PRESENT ILLNESS
[Home] : at home, [unfilled] , at the time of the visit. [Medical Office: ([facility])___] : at the medical office located in  [Verbal consent obtained from patient] : the patient, [unfilled] [de-identified] : The patient is here for follow up, currently on chemotherapy with carbo/abraxane. \par Since her last visit she continues to feel well overall with good appetite and energy. No significant changes in her symptoms. She has good energy and continues to stay active and is working from home. Stage grade 1 fatigue and neuropathy of the hands and feet. Using gabapentin and medical marijuana occasionally with good effect. \par \par She denies fevers, chills, nausea, vomiting, abdominal pain, urinary symptoms, vaginal bleeding/discharge, CP or SOB. \par  \par \par \par  [de-identified] : Referred by Dr. Fuentes\par Telephone visit:\par Due to the COVID19 pandemic, this interview was conducted over the telephone, with the patient and her . I was not able to do a physical exam during this interview. 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 She is being referred to medical oncology for treatment considerations for recurrent metastatic disease.\par \par Ms. Manriquez initially presented to her GYN (Dr. Powell, 5/17/18) for postmenopausal bleeding that began shortly after Lap cholecystectomy (4/9/18). She underwent a Pelvic US that showed 10.8 x 6.7 x 6.1 cm uterus with fibroids. She underwent endometrial biopsy with Dr. English at [place] on 5/21/18. Pathology was significant for endometrial adenocarcinoma, endometroid type, FIGO grade 1 nuclear grade 2 with foci of squamous differentiation. \par \par MRI of the pelvis on 5/24/18 showed a large mass involving the lower uterine segment and body. There were no pathologically enlarged lymph nodes but there was a large heterogeneous lesion involving the left sacrum suspicious for osseous metastatic disease. She underwent biopsy of the left sacral lesion which was read as consistent with hemangioma to rule out metastatic disease.\par \par She subsequently underwent comprehensive surgical staging by Dr. Fuentes including RTLH, BSO, SLN, PPALND on 7/19/18. Pathology for significant for FIGO grade 1 nuclear grade 2 invasive adenocarcinoma with LVSI and 17/20 mm muscle invasion, stage IB. There was no cervical stromal invasion, lower uterine segment invasion, or uterine serosal involvement. All the sampled left pelvic, right pelvic and para-aortic nodes were free of cancer. \par \par Postoperatively she did well and received vaginal brachytherapy 2500 cGy in 5 fractions over 10 days completed 9/17/18 with Dr. Littlejohn of radiation oncology. \par \par She felt well without any concerning symptoms and was PHUONG under surveillance until she reported new left sided abdominal pain and a firmness. In retrospect, she thinks that she felt something different in her L abdomen/pelvis for the past 1-2 months. She followed up with Dr. Fuentes who palpated a 3cm left sided mass under robotic trochar incision suspicious for recurrence. \par \par CT CAP 2/15/19 demonstrated:\par New pulmonary nodule (two subcm nodules, new), external iliac lymphadenopathy, soft tissue intraperitoneal implants, and intramuscular enhancing nodules consistent with metastatic disease. \par \par PERITONEUM: Trace amount of pelvic fluid. There multiple new soft tissue masses, for instance \par adjacent to the descending colon along the left paracolic gutter measuring 4.6 x 3.1 x 2.2 cm and \par within the right lower quadrant measuring 3.6 x 3.4 x 1.8 cm. \par VESSELS: Within normal limits. \par RETROPERITONEUM/LYMPH NODES: Bilateral new enlarged external iliac lymph nodes (2:133) \par measuring up to 2.2 x 1.7 cm on the left \par ABDOMINAL WALL: Within the left rectus muscle there is an enhancing mass measuring 5.0 x 2.8 x \par 5.7 cm, with an additional nodule within the anterior subcutaneous tissues. There is an additional 2 \par cm mass abutting the inferior left rectus muscle. \par BONES: Within normal limits. \par \par She underwent US-guided abdominal wall mass biopsy on 3/6 by IR. Pathology review from the biopsy demonstrated metastatic adenocarcinoma, c/w metastasis from patient's known history of endometroid endometrial adenocarcinoma. \par \par Addendum\par Addendum issued to report results of additional immunostains\par P53:Wild type; Ki-67 is mildly elevated, WT1 is negative\par The cytomorphology in conjunction with the immunophenotype is consistent with metastases from a primary endometrioid endometrial adenocarcinoma.\par \par She has been referred to medical oncology to discuss systemic treatment for her recurrent endometrial cancer. She feels very well overall, although she continues to feel the intermittent L sided abdominal discomfort and pulling sensation. She is working full time and lives with her . She has a daughter. She has a good appetite and denies any weight changes.

## 2021-08-17 NOTE — ASSESSMENT
[FreeTextEntry1] : Ms. Manriquez is a 59 y/o postmenopausal F with history of stage IB endometrioid endometrial adenocarcinoma, grade 2 +LVSI, 17/20mm muscle invasion s/p RTLH/BSO, SLN, PPALND in 7/2018 followed by adjuvant vaginal brachytherapy completed in 11/2018, who was PHUONG until February 2020 when she was found to have recurrent metastatic disease, now biopsy-proven from new abdominal wall mass.\par \par She remains clinically stable with acceptable toxicities from chemotherapy. 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 Symptoms improved with reduction of chemotherapy doses and initiation of medical cannabis, which she is intermittently using.\par \par Most recent CT scans from 4/23/21 demonstrate continued interval decrease in size of peritoneal implants, pelvic lymphadenopathy, and a left rectus intramuscular mass.\par \par Plan:\par -Interval bloodwork and imaging reviewed\par -continue carboplatin/abraxane given continue partial response and tolerating treatment well\par -f/u palliative care for management of pain and neuropathy, symptoms improving with medical cannabis\par -f/u gyn onc Dr. Fuentes \par -screening mammogram ordered\par -RTC 3 weeks \par \par The patient is in agreement with the plan of care. All of her questions and concerns were addressed. \par \par \par \par

## 2021-08-17 NOTE — HISTORY OF PRESENT ILLNESS
[Home] : at home, [unfilled] , at the time of the visit. [Medical Office: ([facility])___] : at the medical office located in  [Verbal consent obtained from patient] : the patient, [unfilled] [de-identified] : Referred by Dr. Fuentes\par Telephone visit:\par Due to the COVID19 pandemic, this interview was conducted over the telephone, with the patient and her . I was not able to do a physical exam during this interview. 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 She is being referred to medical oncology for treatment considerations for recurrent metastatic disease.\par \par Ms. Manriquez initially presented to her GYN (Dr. Powell, 5/17/18) for postmenopausal bleeding that began shortly after Lap cholecystectomy (4/9/18). She underwent a Pelvic US that showed 10.8 x 6.7 x 6.1 cm uterus with fibroids. She underwent endometrial biopsy with Dr. English at [place] on 5/21/18. Pathology was significant for endometrial adenocarcinoma, endometroid type, FIGO grade 1 nuclear grade 2 with foci of squamous differentiation. \par \par MRI of the pelvis on 5/24/18 showed a large mass involving the lower uterine segment and body. There were no pathologically enlarged lymph nodes but there was a large heterogeneous lesion involving the left sacrum suspicious for osseous metastatic disease. She underwent biopsy of the left sacral lesion which was read as consistent with hemangioma to rule out metastatic disease.\par \par She subsequently underwent comprehensive surgical staging by Dr. Fuentes including RTLH, BSO, SLN, PPALND on 7/19/18. Pathology for significant for FIGO grade 1 nuclear grade 2 invasive adenocarcinoma with LVSI and 17/20 mm muscle invasion, stage IB. There was no cervical stromal invasion, lower uterine segment invasion, or uterine serosal involvement. All the sampled left pelvic, right pelvic and para-aortic nodes were free of cancer. \par \par Postoperatively she did well and received vaginal brachytherapy 2500 cGy in 5 fractions over 10 days completed 9/17/18 with Dr. Littlejohn of radiation oncology. \par \par She felt well without any concerning symptoms and was PHUONG under surveillance until she reported new left sided abdominal pain and a firmness. In retrospect, she thinks that she felt something different in her L abdomen/pelvis for the past 1-2 months. She followed up with Dr. Fuentes who palpated a 3cm left sided mass under robotic trochar incision suspicious for recurrence. \par \par CT CAP 2/15/19 demonstrated:\par New pulmonary nodule (two subcm nodules, new), external iliac lymphadenopathy, soft tissue intraperitoneal implants, and intramuscular enhancing nodules consistent with metastatic disease. \par \par PERITONEUM: Trace amount of pelvic fluid. There multiple new soft tissue masses, for instance \par adjacent to the descending colon along the left paracolic gutter measuring 4.6 x 3.1 x 2.2 cm and \par within the right lower quadrant measuring 3.6 x 3.4 x 1.8 cm. \par VESSELS: Within normal limits. \par RETROPERITONEUM/LYMPH NODES: Bilateral new enlarged external iliac lymph nodes (2:133) \par measuring up to 2.2 x 1.7 cm on the left \par ABDOMINAL WALL: Within the left rectus muscle there is an enhancing mass measuring 5.0 x 2.8 x \par 5.7 cm, with an additional nodule within the anterior subcutaneous tissues. There is an additional 2 \par cm mass abutting the inferior left rectus muscle. \par BONES: Within normal limits. \par \par She underwent US-guided abdominal wall mass biopsy on 3/6 by IR. Pathology review from the biopsy demonstrated metastatic adenocarcinoma, c/w metastasis from patient's known history of endometroid endometrial adenocarcinoma. \par \par Addendum\par Addendum issued to report results of additional immunostains\par P53:Wild type; Ki-67 is mildly elevated, WT1 is negative\par The cytomorphology in conjunction with the immunophenotype is consistent with metastases from a primary endometrioid endometrial adenocarcinoma.\par \par She has been referred to medical oncology to discuss systemic treatment for her recurrent endometrial cancer. She feels very well overall, although she continues to feel the intermittent L sided abdominal discomfort and pulling sensation. She is working full time and lives with her . She has a daughter. She has a good appetite and denies any weight changes.  [de-identified] : The patient is here for follow up. Since her last chemotherapy with carbo/abraxane on 6/7/21. \par Since her last visit she continues to feel well overall. She has a good appetite and continues to work from home. No significant changes in her symptoms or new complaints. \par Stage grade 1 fatigue and neuropathy of the hands and feet. Using gabapentin and medical marijuana occasionally with good effect. \par \par She denies fevers, chills, nausea, vomiting, abdominal pain, urinary symptoms, vaginal bleeding/discharge, CP or SOB. \par  \par \par \par

## 2021-08-17 NOTE — RESULTS/DATA
[FreeTextEntry1] : CT A/P 4/23/21:\par \par FINDINGS:\par LOWER CHEST: Trace pericardial effusion.\par \par LIVER: Diffuse hepatic steatosis.\par BILE DUCTS: Normal caliber.\par GALLBLADDER: Status post cholecystectomy.\par SPLEEN: Unchanged 1.5 cm hyperdense lesion in the anterior aspect of the spleen.\par PANCREAS: Within normal limits.\par ADRENALS: Within normal limits.\par KIDNEYS/URETERS: No hydronephrosis. Subcentimeter hypodense foci in the kidneys bilaterally, too small to characterize.\par \par BLADDER: Within normal limits.\par REPRODUCTIVE ORGANS: Status post hysterectomy.\par \par BOWEL: No bowel obstruction. Small periampullary duodenal diverticulum.\par PERITONEUM: No ascites. Peritoneal implants have slightly decreased in size. Reference implants are as follows:\par Left lower quadrant (2:84) measures 1.2 x 1.2 cm, unchanged.\par Inferior left lower quadrant pelvic sidewall implant (2:88) measures 1.6 x 1.2 cm, previously 2 x 1.8 cm.\par Right lower quadrant implant (2:89) measures 1.7 x 0.9 cm, previously 2 x 1.4 cm.\par VESSELS: Within normal limits.\par RETROPERITONEUM/LYMPH NODES: Pelvic lymphadenopathy, with reference lymph node as follows:\par Left internal iliac (2:93) measures 1.4 x 1.2 cm, previously 1.7 x 1.2 cm..\par ABDOMINAL WALL: Left rectus intramuscular mass (2:67) measures 1.9 cm, previously 3.2 cm.\par BONES: Degenerative changes.  Lytic lesion in the left hemisacrum, unchanged.\par \par IMPRESSION:\par Interval decrease in size of peritoneal implants, pelvic lymphadenopathy, and a left rectus intramuscular mass as described above since 1/29/2021.\par \par \par CT A/P 1/29/21:\par \par FINDINGS:\par LOWER CHEST: Within normal limits.\par \par LIVER: Within normal limits.\par BILE DUCTS: Normal caliber.\par GALLBLADDER: Within normal limits.\par SPLEEN: Normal in size with a stable 1.5 cm hypervascular nodule, likely benign.\par PANCREAS: Within normal limits.\par ADRENALS: Within normal limits.\par KIDNEYS/URETERS: Within normal limits.\par \par BLADDER: Within normal limits.\par REPRODUCTIVE ORGANS: Hysterectomy. No adnexal mass.\par \par BOWEL: No bowel obstruction.\par PERITONEUM: No ascites. Peritoneal implants have decreased in size. Representative left lower quadrant nodule measures 1.2 x 1.2 cm (2, 80), previously 2.0 x 1.9 cm. An inferior left lower quadrant pelvic sidewall implant measures 2.0 x 1.8 cm (2, 85), previously 2.4 x 2.6 cm. A right lower quadrant implant measures 2.0 x 1.4 cm (2, 88)., previously 2.6 x 2.0 cm.\par VESSELS: Within normal limits.\par RETROPERITONEUM/LYMPH NODES: Decreased pelvic adenopathy. Representative left internal iliac adenopathy measures 1.7 x 1.2 cm (2, 91), previously 2.0 x 1.6 cm.\par ABDOMINAL WALL: A left rectus intramuscular mass measures 3.2 cm, previously 4.3 cm.\par BONES: Degenerative changes. A lytic lesion in the left sacrum is unchanged.\par \par IMPRESSION:\par Since 10/7/20, interval decrease in peritoneal, retroperitoneal and abdominal wall disease.\par

## 2021-08-17 NOTE — ASSESSMENT
[FreeTextEntry1] : Ms. Manriquez is a 61 y/o postmenopausal F with history of stage IB endometrioid endometrial adenocarcinoma, grade 2 +LVSI, 17/20mm muscle invasion s/p RTLH/BSO, SLN, PPALND in 7/2018 followed by adjuvant vaginal brachytherapy completed in 11/2018, who was PHUONG until February 2020 when she was found to have recurrent metastatic disease, now biopsy-proven from new abdominal wall mass.\par \par Most recent CT scans from 4/23/21 demonstrate continued interval decrease in size of peritoneal implants, pelvic lymphadenopathy, and a left rectus intramuscular mass.\par \par She remains clinically stable with acceptable toxicities from chemotherapy. Continues to tolerate carbo/abraxane with stable G1 neuropathy of her extremities. No new symptoms to report. She feels that her abdominal symptoms continue to improve while she is on chemotherapy.\par \par Plan:\par -Interval bloodwork scheduled \par -continue carboplatin/abraxane ; will plan to repeat imaging studies after one more cycle \par -f/u palliative care for management of pain and neuropathy, symptoms improving with medical cannabis\par -f/u gyn onc at [facility]\par -screening mammogram ordered\par -RTC 3 weeks \par \par The patient is in agreement with the plan of care. All of her questions and concerns were addressed. \par \par \par \par

## 2021-08-17 NOTE — REVIEW OF SYSTEMS
[Negative] : Musculoskeletal [Neuropathy] : neuropathy [FreeTextEntry2] : grade 1 neuropathy of fingers and toes b/l, improved after this cycle  [de-identified] : mild constipation, occasional R sided/umbilical abdominal discomfort

## 2021-08-17 NOTE — PHYSICAL EXAM
[Restricted in physically strenuous activity but ambulatory and able to carry out work of a light or sedentary nature] : Status 1- Restricted in physically strenuous activity but ambulatory and able to carry out work of a light or sedentary nature, e.g., light house work, office work [Normal] : affect appropriate [de-identified] : +alopecia  [de-identified] : Normal respiratory effort and rate

## 2021-08-17 NOTE — REVIEW OF SYSTEMS
[Negative] : Musculoskeletal [Neuropathy] : neuropathy [FreeTextEntry2] : grade 1 neuropathy of fingers and toes b/l, improved after this cycle  [de-identified] : mild constipation, occasional R sided/umbilical abdominal discomfort

## 2021-08-20 ENCOUNTER — APPOINTMENT (OUTPATIENT)
Dept: CT IMAGING | Facility: IMAGING CENTER | Age: 61
End: 2021-08-20
Payer: COMMERCIAL

## 2021-08-20 ENCOUNTER — OUTPATIENT (OUTPATIENT)
Dept: OUTPATIENT SERVICES | Facility: HOSPITAL | Age: 61
LOS: 1 days | End: 2021-08-20
Payer: COMMERCIAL

## 2021-08-20 DIAGNOSIS — Z90.49 ACQUIRED ABSENCE OF OTHER SPECIFIED PARTS OF DIGESTIVE TRACT: Chronic | ICD-10-CM

## 2021-08-20 DIAGNOSIS — Z98.891 HISTORY OF UTERINE SCAR FROM PREVIOUS SURGERY: Chronic | ICD-10-CM

## 2021-08-20 DIAGNOSIS — C54.1 MALIGNANT NEOPLASM OF ENDOMETRIUM: ICD-10-CM

## 2021-08-20 DIAGNOSIS — Z98.890 OTHER SPECIFIED POSTPROCEDURAL STATES: Chronic | ICD-10-CM

## 2021-08-20 PROCEDURE — 74177 CT ABD & PELVIS W/CONTRAST: CPT | Mod: 26

## 2021-08-20 PROCEDURE — 74177 CT ABD & PELVIS W/CONTRAST: CPT

## 2021-08-23 ENCOUNTER — APPOINTMENT (OUTPATIENT)
Dept: HEMATOLOGY ONCOLOGY | Facility: CLINIC | Age: 61
End: 2021-08-23
Payer: COMMERCIAL

## 2021-08-24 ENCOUNTER — APPOINTMENT (OUTPATIENT)
Dept: HEMATOLOGY ONCOLOGY | Facility: CLINIC | Age: 61
End: 2021-08-24
Payer: COMMERCIAL

## 2021-08-24 ENCOUNTER — RESULT REVIEW (OUTPATIENT)
Age: 61
End: 2021-08-24

## 2021-08-24 ENCOUNTER — OUTPATIENT (OUTPATIENT)
Dept: OUTPATIENT SERVICES | Facility: HOSPITAL | Age: 61
LOS: 1 days | End: 2021-08-24
Payer: COMMERCIAL

## 2021-08-24 VITALS
OXYGEN SATURATION: 99 % | HEART RATE: 68 BPM | RESPIRATION RATE: 16 BRPM | BODY MASS INDEX: 25.77 KG/M2 | DIASTOLIC BLOOD PRESSURE: 85 MMHG | TEMPERATURE: 97.8 F | WEIGHT: 145.5 LBS | SYSTOLIC BLOOD PRESSURE: 142 MMHG

## 2021-08-24 DIAGNOSIS — Z90.49 ACQUIRED ABSENCE OF OTHER SPECIFIED PARTS OF DIGESTIVE TRACT: Chronic | ICD-10-CM

## 2021-08-24 DIAGNOSIS — Z98.890 OTHER SPECIFIED POSTPROCEDURAL STATES: Chronic | ICD-10-CM

## 2021-08-24 DIAGNOSIS — C54.1 MALIGNANT NEOPLASM OF ENDOMETRIUM: ICD-10-CM

## 2021-08-24 DIAGNOSIS — Z98.891 HISTORY OF UTERINE SCAR FROM PREVIOUS SURGERY: Chronic | ICD-10-CM

## 2021-08-24 LAB
BASOPHILS # BLD AUTO: 0.02 K/UL — SIGNIFICANT CHANGE UP (ref 0–0.2)
BASOPHILS NFR BLD AUTO: 0.4 % — SIGNIFICANT CHANGE UP (ref 0–2)
EOSINOPHIL # BLD AUTO: 0.02 K/UL — SIGNIFICANT CHANGE UP (ref 0–0.5)
EOSINOPHIL NFR BLD AUTO: 0.4 % — SIGNIFICANT CHANGE UP (ref 0–6)
HCT VFR BLD CALC: 33.1 % — LOW (ref 34.5–45)
HGB BLD-MCNC: 11 G/DL — LOW (ref 11.5–15.5)
IMM GRANULOCYTES NFR BLD AUTO: 0.6 % — SIGNIFICANT CHANGE UP (ref 0–1.5)
LYMPHOCYTES # BLD AUTO: 1.62 K/UL — SIGNIFICANT CHANGE UP (ref 1–3.3)
LYMPHOCYTES # BLD AUTO: 31 % — SIGNIFICANT CHANGE UP (ref 13–44)
MCHC RBC-ENTMCNC: 28.4 PG — SIGNIFICANT CHANGE UP (ref 27–34)
MCHC RBC-ENTMCNC: 33.2 G/DL — SIGNIFICANT CHANGE UP (ref 32–36)
MCV RBC AUTO: 85.5 FL — SIGNIFICANT CHANGE UP (ref 80–100)
MONOCYTES # BLD AUTO: 0.57 K/UL — SIGNIFICANT CHANGE UP (ref 0–0.9)
MONOCYTES NFR BLD AUTO: 10.9 % — SIGNIFICANT CHANGE UP (ref 2–14)
NEUTROPHILS # BLD AUTO: 2.97 K/UL — SIGNIFICANT CHANGE UP (ref 1.8–7.4)
NEUTROPHILS NFR BLD AUTO: 56.7 % — SIGNIFICANT CHANGE UP (ref 43–77)
NRBC # BLD: 0 /100 WBCS — SIGNIFICANT CHANGE UP (ref 0–0)
PLATELET # BLD AUTO: 234 K/UL — SIGNIFICANT CHANGE UP (ref 150–400)
RBC # BLD: 3.87 M/UL — SIGNIFICANT CHANGE UP (ref 3.8–5.2)
RBC # FLD: 14.6 % — HIGH (ref 10.3–14.5)
WBC # BLD: 5.23 K/UL — SIGNIFICANT CHANGE UP (ref 3.8–10.5)
WBC # FLD AUTO: 5.23 K/UL — SIGNIFICANT CHANGE UP (ref 3.8–10.5)

## 2021-08-24 PROCEDURE — 86900 BLOOD TYPING SEROLOGIC ABO: CPT

## 2021-08-24 PROCEDURE — 99214 OFFICE O/P EST MOD 30 MIN: CPT

## 2021-08-24 PROCEDURE — 86901 BLOOD TYPING SEROLOGIC RH(D): CPT

## 2021-08-24 PROCEDURE — 86850 RBC ANTIBODY SCREEN: CPT

## 2021-08-24 RX ORDER — LENVATINIB 14 MG/DAY
10 & 4 KIT ORAL
Qty: 60 | Refills: 2 | Status: ACTIVE | COMMUNITY
Start: 2021-08-24 | End: 1900-01-01

## 2021-08-24 NOTE — HISTORY OF PRESENT ILLNESS
[de-identified] : Referred by Dr. Fuentes\par Telephone visit:\par Due to the COVID19 pandemic, this interview was conducted over the telephone, with the patient and her . I was not able to do a physical exam during this interview. 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 She is being referred to medical oncology for treatment considerations for recurrent metastatic disease.\par \par Ms. Manriqeuz initially presented to her GYN (Dr. Powell, 5/17/18) for postmenopausal bleeding that began shortly after Lap cholecystectomy (4/9/18). She underwent a Pelvic US that showed 10.8 x 6.7 x 6.1 cm uterus with fibroids. She underwent endometrial biopsy with Dr. English at [place] on 5/21/18. Pathology was significant for endometrial adenocarcinoma, endometroid type, FIGO grade 1 nuclear grade 2 with foci of squamous differentiation. \par \par MRI of the pelvis on 5/24/18 showed a large mass involving the lower uterine segment and body. There were no pathologically enlarged lymph nodes but there was a large heterogeneous lesion involving the left sacrum suspicious for osseous metastatic disease. She underwent biopsy of the left sacral lesion which was read as consistent with hemangioma to rule out metastatic disease.\par \par She subsequently underwent comprehensive surgical staging by Dr. Fuentes including RTLH, BSO, SLN, PPALND on 7/19/18. Pathology for significant for FIGO grade 1 nuclear grade 2 invasive adenocarcinoma with LVSI and 17/20 mm muscle invasion, stage IB. There was no cervical stromal invasion, lower uterine segment invasion, or uterine serosal involvement. All the sampled left pelvic, right pelvic and para-aortic nodes were free of cancer. \par \par Postoperatively she did well and received vaginal brachytherapy 2500 cGy in 5 fractions over 10 days completed 9/17/18 with Dr. Littlejohn of radiation oncology. \par \par She felt well without any concerning symptoms and was PHUONG under surveillance until she reported new left sided abdominal pain and a firmness. In retrospect, she thinks that she felt something different in her L abdomen/pelvis for the past 1-2 months. She followed up with Dr. Fuentes who palpated a 3cm left sided mass under robotic trochar incision suspicious for recurrence. \par \par CT CAP 2/15/19 demonstrated:\par New pulmonary nodule (two subcm nodules, new), external iliac lymphadenopathy, soft tissue intraperitoneal implants, and intramuscular enhancing nodules consistent with metastatic disease. \par \par PERITONEUM: Trace amount of pelvic fluid. There multiple new soft tissue masses, for instance \par adjacent to the descending colon along the left paracolic gutter measuring 4.6 x 3.1 x 2.2 cm and \par within the right lower quadrant measuring 3.6 x 3.4 x 1.8 cm. \par VESSELS: Within normal limits. \par RETROPERITONEUM/LYMPH NODES: Bilateral new enlarged external iliac lymph nodes (2:133) \par measuring up to 2.2 x 1.7 cm on the left \par ABDOMINAL WALL: Within the left rectus muscle there is an enhancing mass measuring 5.0 x 2.8 x \par 5.7 cm, with an additional nodule within the anterior subcutaneous tissues. There is an additional 2 \par cm mass abutting the inferior left rectus muscle. \par BONES: Within normal limits. \par \par She underwent US-guided abdominal wall mass biopsy on 3/6 by IR. Pathology review from the biopsy demonstrated metastatic adenocarcinoma, c/w metastasis from patient's known history of endometroid endometrial adenocarcinoma. \par \par Addendum\par Addendum issued to report results of additional immunostains\par P53:Wild type; Ki-67 is mildly elevated, WT1 is negative\par The cytomorphology in conjunction with the immunophenotype is consistent with metastases from a primary endometrioid endometrial adenocarcinoma.\par \par She has been referred to medical oncology to discuss systemic treatment for her recurrent endometrial cancer. She feels very well overall, although she continues to feel the intermittent L sided abdominal discomfort and pulling sensation. She is working full time and lives with her . She has a daughter. She has a good appetite and denies any weight changes. \par \par \par \par adjuvant vaginal brachytherapy completed in 11/2018 and was PHUONG until February 2020 when she was found to have recurrent metastatic disease, now biopsy-proven from new abdominal wall mass when she was started on Carbo/Abraxane May 2020 [de-identified] : Recurrent/stage IV as of Feb 2020 [FreeTextEntry1] : palliative Carbo/Abraxane  [de-identified] : Estefani comes in for follow up and had scans done 8/20/2021. She reports feeling well. She had epigastric and L sided abdominal cramping yesterday, but thinks this was related to something she ate, symptoms have resolved. No other new symptoms to report. She has good appetite and energy.  \par Stage grade 1 neuropathy of the hands and feet. Using gabapentin and medical marijuana occasionally with good effect. \par She denies fevers, chills, nausea, vomiting, abdominal pain, urinary symptoms, vaginal bleeding/discharge, CP or SOB. \par  \par \par \par

## 2021-08-24 NOTE — REVIEW OF SYSTEMS
[FreeTextEntry2] : grade 1 neuropathy of fingers and toes b/l, improved after this cycle  [de-identified] : mild constipation, occasional R sided/umbilical abdominal discomfort

## 2021-08-24 NOTE — RESULTS/DATA
[FreeTextEntry1] : Mammogram 8/9/21:\par \par IMPRESSION:\par No mammographic evidence of malignancy.\par \par RECOMMENDATION:  Mammography in 1 year.\par BI-RADS 2 - Benign Finding(s)\par \par CT A/P 8/20/21:\par FINDINGS:\par LOWER CHEST: Within normal limits.\par \par LIVER: Within normal limits.\par BILE DUCTS: Normal caliber.\par GALLBLADDER: Cholecystectomy.\par SPLEEN: Dense/enhancing 1.6 cm mass in the anterior spleen is unchanged.\par PANCREAS: Within normal limits.\par ADRENALS: Within normal limits.\par KIDNEYS/URETERS: Within normal limits.\par \par BLADDER: Within normal limits.\par REPRODUCTIVE ORGANS: Hysterectomy. No adnexal mass. Sagittal cuff is within normal limits.\par \par BOWEL: No bowel obstruction.\par PERITONEUM: No ascites.New upper abdominal/omental peritoneal implants are noted, including:\par 1.2 x 1.1 cm right upper quadrant implant (2, 53).\par 1.2 x 0.7 cm right upper quadrant implant (2, 49).\par \par Pelvic implants are unchanged, with reference lesions as follows:\par Left lower quadrant implant measuring 1.3 x 1.2 cm (2, 82), unchanged.\par More inferior 1.6 cm left lower quadrant pelvic sidewall implant (2, 86),\par \par VESSELS: Within normal limits.\par RETROPERITONEUM/LYMPH NODES: Left internal iliac adenopathy measures 1.6 x 1.5 cm (2, 91), previously 1.6 x 1.3 cm when measured similarly.\par ABDOMINAL WALL: Within normal limits.\par BONES: Within normal limits.\par \par IMPRESSION:\par Several small new peritoneal implants in the upper abdomen/omentum. Pelvic implants and adenopathy are unchanged.\par \par \par \par

## 2021-08-24 NOTE — ASSESSMENT
[FreeTextEntry1] : Ms. Manriquez is a 60 y/o postmenopausal F with history of stage IB endometrioid endometrial adenocarcinoma, grade 2 +LVSI, 17/20mm muscle invasion s/p RTLH/BSO, SLN, PPALND in 7/2018 followed by adjuvant vaginal brachytherapy completed in 11/2018, who was PHUONG until February 2020 when she was found to have recurrent metastatic disease, now biopsy-proven from new abdominal wall mass.\par \par Her most recent CT scans from 4/23/21 showed continued response to current chemotherapy regimen with interval decrease in peritoneal, retroperitoneal and abdominal wall disease.\par \par She remains clinically stable with acceptable toxicities from chemotherapy. 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 \par \par We discussed treatment options going forward, including my recommendations for discontinuation of current regimen. We reviewed my recommendations for treatment with combination of lenvima and pembrolizumab. \par The rationale, benefits and risks, as well as the possible side effects and toxicities of the treatment regimen were reviewed in detail. \par \par Plan:\par -Interval bloodwork and CT scans reviewed in detail with the patient\par -lenvima prescription sent to [facility]; patient to call when she receives medication and we will confirm\par -f/u palliative care for management of pain and neuropathy, symptoms improving with medical cannabis\par -f/u gyn onc Dr. Fuentes; also sees Dr. Foster at [facility]\par -health maintenance reviewed; Mammogram 8/9/21 BIRADS2\par -RTC 3 weeks \par \par The patient is in agreement with the plan of care. All of her questions and concerns were addressed.  \par \par

## 2021-08-25 LAB
ALBUMIN SERPL ELPH-MCNC: 4.8 G/DL
ALP BLD-CCNC: 191 U/L
ALT SERPL-CCNC: 20 U/L
ANION GAP SERPL CALC-SCNC: 15 MMOL/L
AST SERPL-CCNC: 19 U/L
BILIRUB SERPL-MCNC: 0.5 MG/DL
BUN SERPL-MCNC: 16 MG/DL
CALCIUM SERPL-MCNC: 9.7 MG/DL
CANCER AG125 SERPL-ACNC: 7 U/ML
CHLORIDE SERPL-SCNC: 104 MMOL/L
CO2 SERPL-SCNC: 23 MMOL/L
CREAT SERPL-MCNC: 0.86 MG/DL
GLUCOSE SERPL-MCNC: 93 MG/DL
INR PPP: 1.09 RATIO
MAGNESIUM SERPL-MCNC: 1.7 MG/DL
POTASSIUM SERPL-SCNC: 4 MMOL/L
PROT SERPL-MCNC: 7.1 G/DL
PT BLD: 12.8 SEC
SODIUM SERPL-SCNC: 143 MMOL/L
TSH SERPL-ACNC: 2.13 UIU/ML

## 2021-08-27 ENCOUNTER — OUTPATIENT (OUTPATIENT)
Dept: OUTPATIENT SERVICES | Facility: HOSPITAL | Age: 61
LOS: 1 days | Discharge: HOME | End: 2021-08-27

## 2021-08-27 DIAGNOSIS — Z90.49 ACQUIRED ABSENCE OF OTHER SPECIFIED PARTS OF DIGESTIVE TRACT: Chronic | ICD-10-CM

## 2021-08-27 DIAGNOSIS — Z98.891 HISTORY OF UTERINE SCAR FROM PREVIOUS SURGERY: Chronic | ICD-10-CM

## 2021-08-27 DIAGNOSIS — C54.1 MALIGNANT NEOPLASM OF ENDOMETRIUM: ICD-10-CM

## 2021-08-27 DIAGNOSIS — Z98.890 OTHER SPECIFIED POSTPROCEDURAL STATES: Chronic | ICD-10-CM

## 2021-08-30 ENCOUNTER — APPOINTMENT (OUTPATIENT)
Dept: INFUSION THERAPY | Facility: HOSPITAL | Age: 61
End: 2021-08-30

## 2021-08-31 ENCOUNTER — APPOINTMENT (OUTPATIENT)
Dept: INFUSION THERAPY | Facility: HOSPITAL | Age: 61
End: 2021-08-31

## 2021-09-20 PROBLEM — G62.0 CHEMOTHERAPY-INDUCED PERIPHERAL NEUROPATHY: Status: ACTIVE | Noted: 2020-04-29

## 2021-09-20 PROBLEM — C54.1 ENDOMETRIAL CANCER: Status: ACTIVE | Noted: 2018-06-12

## 2021-09-20 NOTE — PHYSICAL EXAM
[Restricted in physically strenuous activity but ambulatory and able to carry out work of a light or sedentary nature] : Status 1- Restricted in physically strenuous activity but ambulatory and able to carry out work of a light or sedentary nature, e.g., light house work, office work [Normal] : affect appropriate [de-identified] : +alopecia  [de-identified] : Normal respiratory effort and rate

## 2021-09-20 NOTE — REVIEW OF SYSTEMS
[Negative] : Musculoskeletal [Neuropathy] : neuropathy [FreeTextEntry2] : grade 1 neuropathy of fingers and toes b/l, improved after this cycle  [de-identified] : mild constipation, occasional R sided/umbilical abdominal discomfort

## 2021-09-20 NOTE — HISTORY OF PRESENT ILLNESS
[Disease: _____________________] : Disease: [unfilled] [AJCC Stage: ____] : AJCC Stage: [unfilled] [de-identified] : Referred by Dr. Fuentes\par Telephone visit:\par Due to the COVID19 pandemic, this interview was conducted over the telephone, with the patient and her . I was not able to do a physical exam during this interview. 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 She is being referred to medical oncology for treatment considerations for recurrent metastatic disease.\par \par Ms. Manriquez initially presented to her GYN (Dr. Powell, 5/17/18) for postmenopausal bleeding that began shortly after Lap cholecystectomy (4/9/18). She underwent a Pelvic US that showed 10.8 x 6.7 x 6.1 cm uterus with fibroids. She underwent endometrial biopsy with Dr. English at [facility] on 5/21/18. Pathology was significant for endometrial adenocarcinoma, endometroid type, FIGO grade 1 nuclear grade 2 with foci of squamous differentiation. \par \par MRI of the pelvis on 5/24/18 showed a large mass involving the lower uterine segment and body. There were no pathologically enlarged lymph nodes but there was a large heterogeneous lesion involving the left sacrum suspicious for osseous metastatic disease. She underwent biopsy of the left sacral lesion which was read as consistent with hemangioma to rule out metastatic disease.\par \par She subsequently underwent comprehensive surgical staging by Dr. Fuentes including RTLH, BSO, SLN, PPALND on 7/19/18. Pathology for significant for FIGO grade 1 nuclear grade 2 invasive adenocarcinoma with LVSI and 17/20 mm muscle invasion, stage IB. There was no cervical stromal invasion, lower uterine segment invasion, or uterine serosal involvement. All the sampled left pelvic, right pelvic and para-aortic nodes were free of cancer. \par \par Postoperatively she did well and received vaginal brachytherapy 2500 cGy in 5 fractions over 10 days completed 9/17/18 with Dr. Littlejohn of radiation oncology. \par \par She felt well without any concerning symptoms and was PHUONG under surveillance until she reported new left sided abdominal pain and a firmness. In retrospect, she thinks that she felt something different in her L abdomen/pelvis for the past 1-2 months. She followed up with Dr. Fuentes who palpated a 3cm left sided mass under robotic trochar incision suspicious for recurrence. \par \par CT CAP 2/15/19 demonstrated:\par New pulmonary nodule (two subcm nodules, new), external iliac lymphadenopathy, soft tissue intraperitoneal implants, and intramuscular enhancing nodules consistent with metastatic disease. \par \par PERITONEUM: Trace amount of pelvic fluid. There multiple new soft tissue masses, for instance \par adjacent to the descending colon along the left paracolic gutter measuring 4.6 x 3.1 x 2.2 cm and \par within the right lower quadrant measuring 3.6 x 3.4 x 1.8 cm. \par VESSELS: Within normal limits. \par RETROPERITONEUM/LYMPH NODES: Bilateral new enlarged external iliac lymph nodes (2:133) \par measuring up to 2.2 x 1.7 cm on the left \par ABDOMINAL WALL: Within the left rectus muscle there is an enhancing mass measuring 5.0 x 2.8 x \par 5.7 cm, with an additional nodule within the anterior subcutaneous tissues. There is an additional 2 \par cm mass abutting the inferior left rectus muscle. \par BONES: Within normal limits. \par \par She underwent US-guided abdominal wall mass biopsy on 3/6 by IR. Pathology review from the biopsy demonstrated metastatic adenocarcinoma, c/w metastasis from patient's known history of endometroid endometrial adenocarcinoma. \par \par Addendum\par Addendum issued to report results of additional immunostains\par P53:Wild type; Ki-67 is mildly elevated, WT1 is negative\par The cytomorphology in conjunction with the immunophenotype is consistent with metastases from a primary endometrioid endometrial adenocarcinoma.\par \par She has been referred to medical oncology to discuss systemic treatment for her recurrent endometrial cancer -stage IB, grade 2 +LVSI, 17/20 mm muscle invasion s/p RTLH/BSO, SLN, PPALND in 7/2018 followed by adjuvant vaginal brachytherapy completed in 11/2018, who was PHUONG until February 2020 when she was found to have recurrent metastatic disease, now biopsy-proven from new abdominal wall mass when she was started on Carbo/Abraxane May 2020\par \par CT scans from 4/23/21 showed continued response to current chemotherapy regimen with interval decrease in peritoneal, retroperitoneal and abdominal wall disease.\par \par She continues to tolerate carbo/abraxane with 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 \par \par  [de-identified] : Recurrent/stage IV as of Feb 2020 [FreeTextEntry1] : palliative Carbo/Abraxane --> to start Lenvima/Pembro [de-identified] : Estefani comes in for follow up and \par

## 2021-09-22 ENCOUNTER — APPOINTMENT (OUTPATIENT)
Dept: HEMATOLOGY ONCOLOGY | Facility: CLINIC | Age: 61
End: 2021-09-22

## 2021-09-22 DIAGNOSIS — T45.1X5A DRUG-INDUCED POLYNEUROPATHY: ICD-10-CM

## 2021-09-22 DIAGNOSIS — G62.0 DRUG-INDUCED POLYNEUROPATHY: ICD-10-CM

## 2021-09-22 DIAGNOSIS — C54.1 MALIGNANT NEOPLASM OF ENDOMETRIUM: ICD-10-CM

## 2021-09-23 ENCOUNTER — OUTPATIENT (OUTPATIENT)
Dept: OUTPATIENT SERVICES | Facility: HOSPITAL | Age: 61
LOS: 1 days | Discharge: HOME | End: 2021-09-23

## 2021-09-23 DIAGNOSIS — Z90.49 ACQUIRED ABSENCE OF OTHER SPECIFIED PARTS OF DIGESTIVE TRACT: Chronic | ICD-10-CM

## 2021-09-23 DIAGNOSIS — C54.1 MALIGNANT NEOPLASM OF ENDOMETRIUM: ICD-10-CM

## 2021-09-23 DIAGNOSIS — Z98.890 OTHER SPECIFIED POSTPROCEDURAL STATES: Chronic | ICD-10-CM

## 2021-09-23 DIAGNOSIS — Z98.891 HISTORY OF UTERINE SCAR FROM PREVIOUS SURGERY: Chronic | ICD-10-CM

## 2021-09-24 ENCOUNTER — NON-APPOINTMENT (OUTPATIENT)
Age: 61
End: 2021-09-24

## 2021-09-28 ENCOUNTER — APPOINTMENT (OUTPATIENT)
Dept: INFUSION THERAPY | Facility: HOSPITAL | Age: 61
End: 2021-09-28

## 2021-09-29 ENCOUNTER — APPOINTMENT (OUTPATIENT)
Dept: INFUSION THERAPY | Facility: HOSPITAL | Age: 61
End: 2021-09-29

## 2022-04-11 PROBLEM — Z11.59 SCREENING FOR VIRAL DISEASE: Status: ACTIVE | Noted: 2020-03-23

## 2022-05-07 ENCOUNTER — NON-APPOINTMENT (OUTPATIENT)
Age: 62
End: 2022-05-07

## 2023-01-08 ENCOUNTER — NON-APPOINTMENT (OUTPATIENT)
Age: 63
End: 2023-01-08

## 2023-04-04 NOTE — DISCHARGE NOTE ADULT - FUNCTIONAL SCREEN CURRENT LEVEL: DRESSING, MLM
Uncontrolled, continue current medications, medication adherence emphasized and lifestyle modifications recommended. Start amlodipine 5 mg daily for better blood pressure control. Instructed patient to monitor blood pressure at home and keep a log. Increase water and activity level, decrease salt in diet. (0) independent

## 2023-06-08 NOTE — ASU PREOP CHECKLIST - 1.
[facility]   Dr. Yadira May, DPM  [email or web address]  [address], [address], [place] [address]   [phone]    See dermatology over next year.    If LDL cholesterol still really high, will order CT coronary calcium score to better stratify your future risk.   heparin

## 2024-01-01 NOTE — ASU PATIENT PROFILE, ADULT - AS SC BRADEN FRICTION
(3) no apparent problem left testes palpable in scrotum. right testes present in inguinal canal./Scrotal size/Scrotal symmetry/Scrotal shape/Scrotal color texture normal

## 2024-03-22 NOTE — ED PROVIDER NOTE - PSH
History of cholecystectomy    S/P appendectomy    S/P  section
How Severe Is Your Acne?: mild
Is This A New Presentation, Or A Follow-Up?: Acne

## 2024-04-18 NOTE — ASU PATIENT PROFILE, ADULT - NPO AFTER
[FreeTextEntry1] : Ms. Mcdowell presents to the office accompanied by her daughter to discuss screening colonoscopy.  No prior colonoscopy.  She denies any abdominal pain, blood in her stool or recent change in bowel habits.  No family history of colon or rectal cancer. 00:00

## 2024-05-15 NOTE — PATIENT PROFILE ADULT. - VISION (WITH CORRECTIVE LENSES IF THE PATIENT USUALLY WEARS THEM):
reading glasses - OTC/Normal vision: sees adequately in most situations; can see medication labels, newsprint [SOB] : no shortness of breath [Dyspnea on exertion] : not dyspnea during exertion [Chest Discomfort] : no chest discomfort [Palpitations] : no palpitations [Negative] : Heme/Lymph [FreeTextEntry2] : Stable weight

## 2024-05-16 NOTE — ASSESSMENT
Hpi Title: Evaluation of Skin Lesions
[FreeTextEntry1] : Ms. Manriquez is a 59 y/o postmenopausal F with history of stage IB endometrioid endometrial adenocarcinoma, grade 2 +LVSI, 17/20mm muscle invasion s/p RTLH/BSO, SLN, PPALND in 7/2018 followed by adjuvant vaginal brachytherapy completed in 11/2018, who was PHUONG until February 2020 when she was found to have recurrent metastatic disease, now biopsy-proven from new abdominal wall mass. \par \par S/p C4 carbo/taxol on 5/29/20. She had a taxol reaction that included facial flushing, chest discomfort and SOB with C3 and switched to abraxane. Continues to tolerate chemotherapy without significant toxicities.\par We reviewed the interval scans that show stable disease without new findings. Stable L rectus muscle mass ~5cm that is likely causing abdominal discomfort. Plan to continue current treatment course for 2 more cycles and repeat scan.\par \par Plan:\par -Interval bloodwork next week and proceed with C5 carbo/abraxane\par -f/u [facility] tumor testing for mutations including MSI \par -RTC 3 weeks as RPA\par \par The patient is in agreement with the plan of care. The patient and her 's questions and concerns were addressed in full. \par \par 
Have Your Spot(S) Been Treated In The Past?: has not been treated

## 2025-02-07 NOTE — DISCHARGE NOTE ADULT - NSTOBACCOWEBSITE_GEN_A_NCS
Called patient no answer, left voicemail for patient to return call to Anaya Christopher DO office at [phone].    ECO Representative: Please reach out to [address] [facility] via Epic Secure Chat to see if a team member is available to take patient's call.    If team member is unavailable, please document in this encounter that patient returned call and route to: WHITNEY CHRISTOPHER NURSE MS POOL [[phone]].    [place] website --- [email or web address] Website --- [email or web address]

## 2025-03-12 NOTE — DISCHARGE NOTE ADULT - RECOGNIZE DANGER SITUATIONS. FOR EXAMPLE, STRESS, DRINKING ALCOHOL, URGES TO SMOKE, SMOKING CUES, CIGARETTE AVAILABILITY
Detail Level: Zone
Discontinue Regimen: gabapentin 300 mg capsule: Take 1-2 capsules 1 hour prior to bedtime(pt didn’t like it)
Continue Regimen: Donut pillow at night
Plan: S/p ln2 x2\\nBiopsy 10/11/22\\nILK #1: 10/11/22 (Holger-5)\\nILK #2: 11/3/22 (Holger-10)\\nILK #3: 11/15/22 (Holger-10)\\n\\nPt to consider seeing a neurologist for a potential nerve block
Initiate Treatment: Topical lidocaine
Render In Strict Bullet Format?: No
Statement Selected

## 2025-05-20 NOTE — REASON FOR VISIT
Detail Level: Detailed Detail Level: Generalized [FreeTextEntry2] : Metastatic Adenocarcinoma - Primary Endometrioid Endometrial Adenocarcinoma   Detail Level: Zone Detail Level: Simple

## 2025-05-28 NOTE — PATIENT PROFILE ADULT. - SOCIAL CONCERNS
Patient developed acute onset of dyspnea evening of 5/18 after blood transfusion  EKG with transient lateral ST depression, and elevated trops  Seen by cardiology.  Elevated troponin secondary to volume overload  Continue clopidogrel and statin    Lab Results   Component Value Date    HSTNI0 100 (H) 05/23/2025    HSTNI2 91 (H) 05/23/2025    HSTNI4 92 (H) 05/23/2025    HSTNI 159 (H) 05/21/2025      None